# Patient Record
Sex: MALE | Race: WHITE | Employment: OTHER | URBAN - NONMETROPOLITAN AREA
[De-identification: names, ages, dates, MRNs, and addresses within clinical notes are randomized per-mention and may not be internally consistent; named-entity substitution may affect disease eponyms.]

---

## 2017-03-06 ENCOUNTER — OFFICE VISIT (OUTPATIENT)
Dept: FAMILY MEDICINE | Facility: OTHER | Age: 62
End: 2017-03-06
Attending: FAMILY MEDICINE
Payer: COMMERCIAL

## 2017-03-06 VITALS
HEIGHT: 68 IN | BODY MASS INDEX: 30.31 KG/M2 | TEMPERATURE: 97.8 F | DIASTOLIC BLOOD PRESSURE: 70 MMHG | HEART RATE: 78 BPM | WEIGHT: 200 LBS | SYSTOLIC BLOOD PRESSURE: 114 MMHG

## 2017-03-06 DIAGNOSIS — R73.03 PREDIABETES: ICD-10-CM

## 2017-03-06 DIAGNOSIS — K40.20 BILATERAL INGUINAL HERNIA WITHOUT OBSTRUCTION OR GANGRENE, RECURRENCE NOT SPECIFIED: ICD-10-CM

## 2017-03-06 DIAGNOSIS — K21.9 GASTROESOPHAGEAL REFLUX DISEASE WITHOUT ESOPHAGITIS: ICD-10-CM

## 2017-03-06 DIAGNOSIS — Z00.00 ROUTINE GENERAL MEDICAL EXAMINATION AT A HEALTH CARE FACILITY: Primary | ICD-10-CM

## 2017-03-06 DIAGNOSIS — E78.2 MIXED HYPERLIPIDEMIA: ICD-10-CM

## 2017-03-06 DIAGNOSIS — I10 ESSENTIAL HYPERTENSION: ICD-10-CM

## 2017-03-06 DIAGNOSIS — R35.1 NOCTURIA: ICD-10-CM

## 2017-03-06 DIAGNOSIS — F17.201 TOBACCO ABUSE, IN REMISSION: ICD-10-CM

## 2017-03-06 DIAGNOSIS — F43.21 ADJUSTMENT DISORDER WITH DEPRESSED MOOD: ICD-10-CM

## 2017-03-06 DIAGNOSIS — I25.10 CORONARY ARTERY DISEASE INVOLVING NATIVE CORONARY ARTERY OF NATIVE HEART WITHOUT ANGINA PECTORIS: ICD-10-CM

## 2017-03-06 LAB
ALBUMIN SERPL-MCNC: 3.7 G/DL (ref 3.4–5)
ALP SERPL-CCNC: 100 U/L (ref 40–150)
ALT SERPL W P-5'-P-CCNC: 24 U/L (ref 0–70)
ANION GAP SERPL CALCULATED.3IONS-SCNC: 8 MMOL/L (ref 3–14)
AST SERPL W P-5'-P-CCNC: 21 U/L (ref 0–45)
BASOPHILS # BLD AUTO: 0 10E9/L (ref 0–0.2)
BASOPHILS NFR BLD AUTO: 0.4 %
BILIRUB SERPL-MCNC: 0.4 MG/DL (ref 0.2–1.3)
BUN SERPL-MCNC: 13 MG/DL (ref 7–30)
CALCIUM SERPL-MCNC: 8.6 MG/DL (ref 8.5–10.1)
CHLORIDE SERPL-SCNC: 105 MMOL/L (ref 94–109)
CHOLEST SERPL-MCNC: 203 MG/DL
CO2 SERPL-SCNC: 25 MMOL/L (ref 20–32)
CREAT SERPL-MCNC: 0.99 MG/DL (ref 0.66–1.25)
DIFFERENTIAL METHOD BLD: NORMAL
EOSINOPHIL # BLD AUTO: 0.1 10E9/L (ref 0–0.7)
EOSINOPHIL NFR BLD AUTO: 1.3 %
ERYTHROCYTE [DISTWIDTH] IN BLOOD BY AUTOMATED COUNT: 13 % (ref 10–15)
EST. AVERAGE GLUCOSE BLD GHB EST-MCNC: 120 MG/DL
GFR SERPL CREATININE-BSD FRML MDRD: 77 ML/MIN/1.7M2
GLUCOSE SERPL-MCNC: 120 MG/DL (ref 70–99)
HBA1C MFR BLD: 5.8 % (ref 4.3–6)
HCT VFR BLD AUTO: 44.4 % (ref 40–53)
HDLC SERPL-MCNC: 67 MG/DL
HGB BLD-MCNC: 15.7 G/DL (ref 13.3–17.7)
IMM GRANULOCYTES # BLD: 0 10E9/L (ref 0–0.4)
IMM GRANULOCYTES NFR BLD: 0.4 %
LDLC SERPL CALC-MCNC: 117 MG/DL
LYMPHOCYTES # BLD AUTO: 1.3 10E9/L (ref 0.8–5.3)
LYMPHOCYTES NFR BLD AUTO: 24.2 %
MCH RBC QN AUTO: 32.5 PG (ref 26.5–33)
MCHC RBC AUTO-ENTMCNC: 35.4 G/DL (ref 31.5–36.5)
MCV RBC AUTO: 92 FL (ref 78–100)
MONOCYTES # BLD AUTO: 0.6 10E9/L (ref 0–1.3)
MONOCYTES NFR BLD AUTO: 9.9 %
NEUTROPHILS # BLD AUTO: 3.5 10E9/L (ref 1.6–8.3)
NEUTROPHILS NFR BLD AUTO: 63.8 %
NONHDLC SERPL-MCNC: 136 MG/DL
NRBC # BLD AUTO: 0 10*3/UL
NRBC BLD AUTO-RTO: 0 /100
PLATELET # BLD AUTO: 198 10E9/L (ref 150–450)
POTASSIUM SERPL-SCNC: 4.3 MMOL/L (ref 3.4–5.3)
PROT SERPL-MCNC: 7.4 G/DL (ref 6.8–8.8)
PSA SERPL-MCNC: 0.77 UG/L (ref 0–4)
RBC # BLD AUTO: 4.83 10E12/L (ref 4.4–5.9)
SODIUM SERPL-SCNC: 138 MMOL/L (ref 133–144)
TRIGL SERPL-MCNC: 94 MG/DL
WBC # BLD AUTO: 5.5 10E9/L (ref 4–11)

## 2017-03-06 PROCEDURE — 99396 PREV VISIT EST AGE 40-64: CPT | Performed by: FAMILY MEDICINE

## 2017-03-06 PROCEDURE — 80061 LIPID PANEL: CPT | Performed by: FAMILY MEDICINE

## 2017-03-06 PROCEDURE — 84153 ASSAY OF PSA TOTAL: CPT | Performed by: FAMILY MEDICINE

## 2017-03-06 PROCEDURE — 85025 COMPLETE CBC W/AUTO DIFF WBC: CPT | Performed by: FAMILY MEDICINE

## 2017-03-06 PROCEDURE — 71020 ZZHC CHEST TWO VIEWS, FRONT/LAT: CPT | Mod: TC | Performed by: RADIOLOGY

## 2017-03-06 PROCEDURE — 99212 OFFICE O/P EST SF 10 MIN: CPT | Mod: 25 | Performed by: FAMILY MEDICINE

## 2017-03-06 PROCEDURE — 83036 HEMOGLOBIN GLYCOSYLATED A1C: CPT | Performed by: FAMILY MEDICINE

## 2017-03-06 PROCEDURE — 80053 COMPREHEN METABOLIC PANEL: CPT | Performed by: FAMILY MEDICINE

## 2017-03-06 PROCEDURE — 36415 COLL VENOUS BLD VENIPUNCTURE: CPT | Performed by: FAMILY MEDICINE

## 2017-03-06 RX ORDER — METOPROLOL TARTRATE 25 MG/1
TABLET, FILM COATED ORAL
Qty: 180 TABLET | Refills: 3 | Status: SHIPPED | OUTPATIENT
Start: 2017-03-06 | End: 2018-03-12

## 2017-03-06 RX ORDER — NITROGLYCERIN 0.4 MG/1
TABLET SUBLINGUAL
Qty: 25 TABLET | Refills: 5 | Status: SHIPPED | OUTPATIENT
Start: 2017-03-06

## 2017-03-06 RX ORDER — ATORVASTATIN CALCIUM 40 MG/1
TABLET, FILM COATED ORAL
Qty: 90 TABLET | Refills: 3 | Status: SHIPPED | OUTPATIENT
Start: 2017-03-06 | End: 2018-03-12

## 2017-03-06 RX ORDER — OMEPRAZOLE 40 MG/1
CAPSULE, DELAYED RELEASE ORAL
Qty: 90 CAPSULE | Refills: 3 | Status: SHIPPED | OUTPATIENT
Start: 2017-03-06 | End: 2018-03-12

## 2017-03-06 RX ORDER — LISINOPRIL 10 MG/1
TABLET ORAL
Qty: 90 TABLET | Refills: 3 | Status: SHIPPED | OUTPATIENT
Start: 2017-03-06 | End: 2018-02-19

## 2017-03-06 RX ORDER — CITALOPRAM HYDROBROMIDE 40 MG/1
TABLET ORAL
Qty: 30 TABLET | Refills: 1 | Status: SHIPPED | OUTPATIENT
Start: 2017-03-06 | End: 2017-04-10

## 2017-03-06 ASSESSMENT — ANXIETY QUESTIONNAIRES
2. NOT BEING ABLE TO STOP OR CONTROL WORRYING: MORE THAN HALF THE DAYS
1. FEELING NERVOUS, ANXIOUS, OR ON EDGE: SEVERAL DAYS
5. BEING SO RESTLESS THAT IT IS HARD TO SIT STILL: NOT AT ALL
IF YOU CHECKED OFF ANY PROBLEMS ON THIS QUESTIONNAIRE, HOW DIFFICULT HAVE THESE PROBLEMS MADE IT FOR YOU TO DO YOUR WORK, TAKE CARE OF THINGS AT HOME, OR GET ALONG WITH OTHER PEOPLE: SOMEWHAT DIFFICULT
GAD7 TOTAL SCORE: 6
7. FEELING AFRAID AS IF SOMETHING AWFUL MIGHT HAPPEN: NOT AT ALL
3. WORRYING TOO MUCH ABOUT DIFFERENT THINGS: NOT AT ALL
6. BECOMING EASILY ANNOYED OR IRRITABLE: SEVERAL DAYS

## 2017-03-06 ASSESSMENT — PAIN SCALES - GENERAL: PAINLEVEL: NO PAIN (0)

## 2017-03-06 ASSESSMENT — PATIENT HEALTH QUESTIONNAIRE - PHQ9: 5. POOR APPETITE OR OVEREATING: MORE THAN HALF THE DAYS

## 2017-03-06 NOTE — PROGRESS NOTES
Anthony is a 61 year old   Chief Complaint   Patient presents with     Physical       PMHx:  Past Medical History   Diagnosis Date     Acute myocardial infarction, unspecified site, episode of care unspecified 08/03/2011     Adjustment disorder with mixed anxiety and depressed mood 05/23/2012     CAD (coronary artery disease) 3/19/2013     Colon polyps      Coronary atherosclerosis of unspecified type of vessel, native or graft 01/06/2012     Displacement of cervical intervertebral disc without myelopathy      GERD (gastroesophageal reflux disease)      H/O ETOH abuse      HTN (hypertension)      Mixed hyperlipidemia 08/03/2011     Personal history of tobacco use, presenting hazards to health 09/06/2011     Tobacco abuse, in remission     Postsurgical percutaneous transluminal coronary angioplasty status 08/03/2011     Prediabetes      Status post coronary angiogram 3/19/2013     Tobacco abuse, in remission      Past Surgical History   Procedure Laterality Date     Carpal tunnel syndrome  2003     Resolved from Problem List  2012     Stent  07/2011     Myocardial Infarction; Stent placement [facility]'s     Colonoscopy  2008     colonoscopy with polypectomy/ Repeat in 2011     Arthroscopy knee       RT     Release carpal tunnel       RT     Back surgery       C5-6 fusion     Angioplasty  [id number]     Failed angioplasty of OM vessel     Stent, coronary, winsome  2012     Colonoscopy  3/24/2014     Repeat in 2019//Procedure: COLONOSCOPY;  COLONOSCOPY;  Surgeon: Jessica Washington MD;  Location: [facility] OR     These were reviewed with the patient/family.    MEDICATIONS were reviewed and are as follows:   Current Outpatient Prescriptions   Medication Sig Dispense Refill     lisinopril (PRINIVIL/ZESTRIL) 10 MG tablet TAKE 1 TABLET DAILY BY MOUT H - GENERIC FOR ZESTRIL 15 tablet 0     metoprolol (LOPRESSOR) 25 MG tablet TAKE 1 TABLET BY MOUTH TWIC E A DAY 30 tablet 0     omeprazole (PRILOSEC) 40 MG capsule TAKE 1 CAPSULE BY MOUTH ONCE  DAILY. TAKE 30 TO 60 MINUTES BEFORE A MEAL. 15 capsule 0     aspirin 325 MG tablet Take 1 tablet (325 mg) by mouth daily 180 tablet      nitroglycerin (NITROSTAT) 0.4 MG SL tablet PLACE 1 TABLET UNDER THE TONGUE EVERY FIVE MINUTES AS NEEDED FOR CHEST PAIN. DO NOT CRUSH; MAXIMUM OF 3 DOSES IN 15 MINUTES. 25 tablet 5     Omega-3 Fatty Acids (OMEGA-3 FISH OIL PO) Take 1 g by mouth       atorvastatin (LIPITOR) 40 MG tablet TAKE 1 TABLET DAILY BY MOUT H - GENERIC FOR LIPITOR (Patient not taking: Reported on 3/6/2017) 15 tablet 0       ALLERGIES:  Codeine    Family History   Problem Relation Age of Onset     CEREBROVASCULAR DISEASE Father 48     Cause of death     DIABETES Mother      Type 2     HEART DISEASE Mother      Heart valve replacements     Hypertension Mother      Arthritis Sister       Social History   Substance Use Topics     Smoking status: Former Smoker     Packs/day: 1.00     Years: 20.00     Smokeless tobacco: Never Used      Comment: Tried to quit: yes; longest period tobacco-free- 10 years     Alcohol use Yes      Comment: Occasionally       Review Of Systems  Skin: negative for, rash, lumps or bumps, hair changes  Eyes: negative for any visual changes  Ears/Nose/Throat: negative for, nasal congestion, postnasal drainage, hearing loss, vertigo  Respiratory: No shortness of breath, dyspnea on exertion, cough, or hemoptysis  Cardiovascular: negative for, palpitations, tachycardia, chest pain, exertional chest pain or pressure, dyspnea on exertion, lower extremity edema and exercise intolerance  Gastrointestinal: negative for abdominal pain, change in bowel habits, blood in stools.  Genitourinary: negative for dysuria, hematuria, incontinence and sexually transmitted disease. Does have some nocturia  Having sx of right groin pain with  exertion   Musculoskeletal: negative for, joint pain, joint swelling and muscular weakness  Neurologic: negative for, headaches, syncope, local weakness, numbness or tingling  "of hands, numbness or tingling of feet, speech problems and memory problems  Psychiatric: negative for, excessive stress, sleep disturbance, anxiety, depression and excessive alcohol consumption  Hematologic/Lymphatic/Immunologic: negative for, chills, fever and night sweats  Endocrine: negative for, heat intolerance, night sweats, polydipsia and polyuria  PHQ-9 SCORE 2014 2015 3/6/2017   Total Score 2 2 -   Total Score - - 4     HAS MORE DEPRESSIVE S/S SINCE MOTHER  AND SON MOVED AWAY  TROUBLE SLEEPING / INCREASE ETOH- SOME DAILY / \"DONT GIVE A SHIT\"/POOR SLEEP AND DECREASE INTEREST AND ENERGY   IMMUNIZATIONS reviewed.      Constitutional: healthy, alert and no distress  Blood pressure 114/70, pulse 78, temperature 97.8  F (36.6  C), height 5' 8\" (1.727 m), weight 200 lb (90.7 kg).  Head: Normocephalic. No masses, lesions, tenderness or abnormalities  Neck: Neck supple. No adenopathy. Thyroid symmetric, normal size,, Carotids without bruits.  ENT: ENT exam normal, no neck nodes or sinus tenderness  Cardiovascular: negative, PMI normal. No lifts, heaves, or thrills. RRR. No murmurs, clicks gallops or rub  Respiratory: negative, Percussion normal. Good diaphragmatic excursion. Lungs clear  breasts symmetric  Gastrointestinal: Abdomen soft, non-tender. BS normal. No masses, organomegaly  : Normal external genitalia without lesions.  Prostate exam normal for age Bilateral small hernia- - sliding along with small hydrocele if feels like on right side.    Musculoskeletal: extremities normal- no gross deformities noted, gait normal and normal muscle tone  Skin: no suspicious lesions or rashes  Neurologic: negative  Psychiatric: mentation appears normal and affect normal/bright  Hematologic/Lymphatic/Immunologic: normal ant/post cervical, axillary, supraclavicular and inguinal nodes      CXR unremarkable     Results for orders placed or performed in visit on 17 (from the past 24 hour(s))   CBC with " platelets differential   Result Value Ref Range    WBC 5.5 4.0 - 11.0 10e9/L    RBC Count 4.83 4.4 - 5.9 10e12/L    Hemoglobin 15.7 13.3 - 17.7 g/dL    Hematocrit 44.4 40.0 - 53.0 %    MCV 92 78 - 100 fl    MCH 32.5 26.5 - 33.0 pg    MCHC 35.4 31.5 - 36.5 g/dL    RDW 13.0 10.0 - 15.0 %    Platelet Count 198 150 - 450 10e9/L    Diff Method Automated Method     % Neutrophils 63.8 %    % Lymphocytes 24.2 %    % Monocytes 9.9 %    % Eosinophils 1.3 %    % Basophils 0.4 %    % Immature Granulocytes 0.4 %    Nucleated RBCs 0 0 /100    Absolute Neutrophil 3.5 1.6 - 8.3 10e9/L    Absolute Lymphocytes 1.3 0.8 - 5.3 10e9/L    Absolute Monocytes 0.6 0.0 - 1.3 10e9/L    Absolute Eosinophils 0.1 0.0 - 0.7 10e9/L    Absolute Basophils 0.0 0.0 - 0.2 10e9/L    Abs Immature Granulocytes 0.0 0 - 0.4 10e9/L    Absolute Nucleated RBC 0.0    Comprehensive metabolic panel   Result Value Ref Range    Sodium 138 133 - 144 mmol/L    Potassium 4.3 3.4 - 5.3 mmol/L    Chloride 105 94 - 109 mmol/L    Carbon Dioxide 25 20 - 32 mmol/L    Anion Gap 8 3 - 14 mmol/L    Glucose 120 (H) 70 - 99 mg/dL    Urea Nitrogen 13 7 - 30 mg/dL    Creatinine 0.99 0.66 - 1.25 mg/dL    GFR Estimate 77 >60 mL/min/1.7m2    GFR Estimate If Black >90   GFR Calc   >60 mL/min/1.7m2    Calcium 8.6 8.5 - 10.1 mg/dL    Bilirubin Total 0.4 0.2 - 1.3 mg/dL    Albumin 3.7 3.4 - 5.0 g/dL    Protein Total 7.4 6.8 - 8.8 g/dL    Alkaline Phosphatase 100 40 - 150 U/L    ALT 24 0 - 70 U/L    AST 21 0 - 45 U/L   Lipid Profile   Result Value Ref Range    Cholesterol 203 (H) <200 mg/dL    Triglycerides 94 <150 mg/dL    HDL Cholesterol 67 >39 mg/dL    LDL Cholesterol Calculated 117 (H) <100 mg/dL    Non HDL Cholesterol 136 (H) <130 mg/dL   PSA tumor marker   Result Value Ref Range    PSA 0.77 0 - 4 ug/L       ASSESSMENT:  (Z00.00) Routine general medical examination at a health care facility  (primary encounter diagnosis)  Comment: see below  Plan: see in a year      (K21.9) Gastroesophageal reflux disease without esophagitis  Comment: needs PPI- has tried off in past.   Plan: CBC with platelets differential, omeprazole         (PRILOSEC) 40 MG capsule        RF done     (R73.03) Prediabetes  Comment: discussed again .  Plan: Comprehensive metabolic panel, Lipid Profile,         Hemoglobin A1c        Will check A1C - pending    (E78.2) Mixed hyperlipidemia  Comment: off lipitor- needs to restart  Plan: Comprehensive metabolic panel, Lipid Profile,         lisinopril (PRINIVIL/ZESTRIL) 10 MG tablet,         atorvastatin (LIPITOR) 40 MG tablet        Continue current medications and behavioral changes.     (I10) Essential hypertension  Comment: stable - Continue current medications and behavioral changes.   Plan: CBC with platelets differential, Comprehensive         metabolic panel, Lipid Profile            (I25.10) Coronary artery disease involving native coronary artery of native heart without angina pectoris  Comment: stable - Continue current medications and behavioral changes.   Plan: nitroglycerin (NITROSTAT) 0.4 MG sublingual         tablet            (F17.201) Tobacco abuse, in remission  Comment: CXR ok   Plan: Comprehensive metabolic panel, Lipid Profile,         XR Chest 2 Views            (R35.1) Nocturia  Comment: mild   Plan: PSA tumor marker            (F43.21) Adjustment disorder with depressed mood  Comment: discussed- handouts given . Risk and benefits of SSRI was discussed and verbal consent to proceed was given.   Plan: citalopram (CELEXA) 40 MG tablet        F/u in 4 weeks.    (K40.20) Bilateral inguinal hernia without obstruction or gangrene, recurrence not specified  Comment: symptomatic on right side. Will refer to Gen. Surgery.   Plan: GENERAL SURG ADULT REFERRAL

## 2017-03-06 NOTE — NURSING NOTE
"Chief Complaint   Patient presents with     Physical       Initial /70 (BP Location: Right arm, Patient Position: Chair, Cuff Size: Adult Large)  Pulse 78  Temp 97.8  F (36.6  C)  Ht 5' 8\" (1.727 m)  Wt 200 lb (90.7 kg)  BMI 30.41 kg/m2 Estimated body mass index is 30.41 kg/(m^2) as calculated from the following:    Height as of this encounter: 5' 8\" (1.727 m).    Weight as of this encounter: 200 lb (90.7 kg).  Medication Reconciliation: complete     Calvin Torre      "

## 2017-03-06 NOTE — MR AVS SNAPSHOT
After Visit Summary   3/6/2017    Anthony Steele    MRN: [id number]           Patient Information     Date Of Birth          1955        Visit Information        Provider Department      3/6/2017 8:00 AM Abdulkadir Huizar MD [facility]        Today's Diagnoses     Routine general medical examination at a health care facility    -  1    Gastroesophageal reflux disease without esophagitis        Prediabetes        Mixed hyperlipidemia        Essential hypertension        Coronary artery disease involving native coronary artery of native heart without angina pectoris        Tobacco abuse, in remission        Nocturia        Essential hypertension with goal blood pressure less than 140/90        Adjustment disorder with depressed mood        Bilateral inguinal hernia without obstruction or gangrene, recurrence not specified          Care Instructions    Results for orders placed or performed in visit on 03/06/17 (from the past 24 hour(s))   CBC with platelets differential   Result Value Ref Range    WBC 5.5 4.0 - 11.0 10e9/L    RBC Count 4.83 4.4 - 5.9 10e12/L    Hemoglobin 15.7 13.3 - 17.7 g/dL    Hematocrit 44.4 40.0 - 53.0 %    MCV 92 78 - 100 fl    MCH 32.5 26.5 - 33.0 pg    MCHC 35.4 31.5 - 36.5 g/dL    RDW 13.0 10.0 - 15.0 %    Platelet Count 198 150 - 450 10e9/L    Diff Method Automated Method     % Neutrophils 63.8 %    % Lymphocytes 24.2 %    % Monocytes 9.9 %    % Eosinophils 1.3 %    % Basophils 0.4 %    % Immature Granulocytes 0.4 %    Nucleated RBCs 0 0 /100    Absolute Neutrophil 3.5 1.6 - 8.3 10e9/L    Absolute Lymphocytes 1.3 0.8 - 5.3 10e9/L    Absolute Monocytes 0.6 0.0 - 1.3 10e9/L    Absolute Eosinophils 0.1 0.0 - 0.7 10e9/L    Absolute Basophils 0.0 0.0 - 0.2 10e9/L    Abs Immature Granulocytes 0.0 0 - 0.4 10e9/L    Absolute Nucleated RBC 0.0    Comprehensive metabolic panel   Result Value Ref Range    Sodium 138 133 - 144 mmol/L    Potassium 4.3 3.4 - 5.3 mmol/L     Chloride 105 94 - 109 mmol/L    Carbon Dioxide 25 20 - 32 mmol/L    Anion Gap 8 3 - 14 mmol/L    Glucose 120 (H) 70 - 99 mg/dL    Urea Nitrogen 13 7 - 30 mg/dL    Creatinine 0.99 0.66 - 1.25 mg/dL    GFR Estimate 77 >60 mL/min/1.7m2    GFR Estimate If Black >90   GFR Calc   >60 mL/min/1.7m2    Calcium 8.6 8.5 - 10.1 mg/dL    Bilirubin Total 0.4 0.2 - 1.3 mg/dL    Albumin 3.7 3.4 - 5.0 g/dL    Protein Total 7.4 6.8 - 8.8 g/dL    Alkaline Phosphatase 100 40 - 150 U/L    ALT 24 0 - 70 U/L    AST 21 0 - 45 U/L   Lipid Profile   Result Value Ref Range    Cholesterol 203 (H) <200 mg/dL    Triglycerides 94 <150 mg/dL    HDL Cholesterol 67 >39 mg/dL    LDL Cholesterol Calculated 117 (H) <100 mg/dL    Non HDL Cholesterol 136 (H) <130 mg/dL   PSA tumor marker   Result Value Ref Range    PSA 0.77 0 - 4 ug/L       Understanding Adjustment Disorders  Most people have stress in their lives, and sometimes you may have more than you can handle. You may find it hard to cope with a stressful event. As a result, you may become anxious and depressed. You might even get sick. These can be symptoms of an adjustment disorder. But you don t have to suffer. Ask your doctor or mental health professional for help.    Common symptoms of an adjustment disorder    Hopelessness    Frequent crying    Depressed mood    Trembling or twitching    Palpitations    Health problems    Withdrawal    Anxiety or tension   What is an adjustment disorder?  Adjustment disorders sometimes occur when life gets to be too much. They often appear within 3 months of a stressful time. The symptoms vary widely. You might pretend the stressful event never happened. Or, you might think about it so much you can t eat or sleep. In most cases, your feelings may seem beyond your control.  What causes it?  The events that trigger an adjustment disorder vary from person to person. Adults may be troubled by work, money, or marriage problems. Teens are more  likely bothered by school or conflict with parents. They also may find it hard to cope with a divorce or sex. The death of a loved one can be especially hard to face. So can major life changes such as a move. Poverty or a lack of social skills may make matters worse.  What can be done?  Adjustment disorders can almost always be helped by therapy. You may feel relieved just to talk to someone. In some cases, only you and your therapist will meet. In others, your whole family may be involved. You might also join a group for people with this disorder. The support and concern of others can help you recover more quickly.    7762-0332 [employer]. [address] [place] [address], [place] [address]. All rights reserved. This information is not intended as a substitute for professional medical care. Always follow your healthcare professional's instructions.        Adjustment Disorder  Life changes -- work, family, parents, children -- each can cause a great deal of stress in life. An adjustment disorder means you have trouble dealing with this change and stress. This problem can have serious results. You may feel helpless, depressed, make bad decisions, or even feel like you want to hurt yourself.  Adjustment disorder can cause anxiety or depression. It is triggered by daily stresses such as:    Death of a loved one    Divorce    Marriage    General life changes such as changing or leaving a job    Moving    Illness or other health issue for you or a family  member    Sex    Money     Symptoms may include:    Sadness, crying    Anxiety    Insomnia    Poor concentration    Trouble doing simple things    New problems at work or with family or friends    Loss of self-esteem    Sense of hopelessness    Feeling trapped or cut off from others  With this condition, it is common to feel sad, guilty, hopeless and restless. These feelings may continue for weeks or months. It can be helpful to identify what is causing the  additional stress and take steps to get extra support. If new stressful events do not occur, it is likely that you will gradually start feeling better.  Home care    If you have been given a prescription for medicine, take it as directed.    It helps to talk about your feelings and thoughts with family or friends that understand and support you.  Follow-up care  Follow up with your healthcare provider, or therapist as advised. Let them know if this condition does not improve or gets worse.  When to seek medical advice  Call your healthcare provider right away if any of these occur:    Worsening depression or anxiety    Feeling out of control    Thoughts of harming yourself or another    Being unable to care for yourself    1530-6717 [employer]. [address] [place] [address], [place] [address]. All rights reserved. This information is not intended as a substitute for professional medical care. Always follow your healthcare professional's instructions.        Grief Reaction  Grief is the feeling that we all have when we lose someone or something that has been important in our life. Grief is an unavoidable and normal reaction to this loss, and can last from months to years. The amount of time depends on different factors. These include how close the person was to you, and how much support you have through the grief process. Symptoms include both physical and emotional symptoms.  Physical reactions:     Loss of appetite or overeating    Changes in weight    Trouble getting to sleep or staying asleep    Hair loss    Upset stomach, indigestion, heart burn, abdominal pain, cramping, diarrhea    Sense of trouble breathing    Trembling, shakiness  Emotional reactions:    Sadness    Anxiety    Feeling depressed or helpless    Difficulty concentrating    Detachment or withdrawal from those around you    Loss of interest in your normal life and work  Home care    Allow yourself to feel the pain of your loss. For some,  this can be a key part of healing grief. Talk about your pain with others who understand. Share good memories that involve the person, pet, or possession  you lost.    Take time for yourself. Make it a point to do things that you enjoy (gardening, walking in nature, going to a movie, etc.).    Take care of your physical body. Eat a balanced diet (low in saturated fat and high in fruits and vegetables) and establish an exercise plan at least 3 times a week for 30 minutes. Even mild-moderate exercise (like brisk walking) can make you feel better. Get plenty of sleep.    Avoid the use of alcohol and drugs to cover your emotional pain. This only slows down the emotional healing process.    Do not isolate yourself from others. Have daily contact with family or friends. Talk about your loss to those closest to you.    For additional support, meet with your //rabbi, a counselor or therapist, or your own doctor.    Consider joining a grief support group. Ask your doctor or our staff for information on how to find one in your area.    If you have been prescribed a medicine to help with your symptoms, take it only as directed. Do not use it with alcohol.  Follow-up care  Follow up with your healthcare provider, or as advised.  Call 911  Call 911 if any of these occur:    Trouble breathing    Very confused    Very drowsy or trouble awakening    Fainting or loss of consciousness    Rapid heart rate    Seizure    New chest pain that becomes more severe, lasts longer, or spreads into your shoulder, arm, neck, jaw or back  When to seek medical advice  Call your healthcare provider right away if any of these occur:    Worsening symptoms    Unable to eat or sleep for three days in a row    Feeling extreme depression, fear, anxiety, or anger toward yourself or others    Feeling out of control    Feeling that you may try to harm yourself    family or friends express concern over your behavior and ask you to get help     5874-2647 [employer]. [address] [place] [address], [place] [address]. All rights reserved. This information is not intended as a substitute for professional medical care. Always follow your healthcare professional's instructions.              Follow-ups after your visit        Additional Services     GENERAL SURG ADULT REFERRAL       Your provider has referred you to: [facility]     Please be aware that coverage of these services is subject to the terms and limitations of your health insurance plan.  Call member services at your health plan with any benefit or coverage questions.      Please bring the following to your appointment:  >>   Any x-rays, CTs or MRIs which have been performed.  Contact the facility where they were done to arrange for  prior to your scheduled appointment.  Any new CT, MRI or other procedures ordered by your specialist must be performed at a [facility] facility or coordinated by your clinic's referral office.    >>   List of current medications   >>   This referral request   >>   Any documents/labs given to you for this referral                  Who to contact     If you have questions or need follow up information about today's clinic visit or your schedule please contact [facility] [place] directly at [phone].  Normal or non-critical lab and imaging results will be communicated to you by MyChart, letter or phone within 4 business days after the clinic has received the results. If you do not hear from us within 7 days, please contact the clinic through [employer] or phone. If you have a critical or abnormal lab result, we will notify you by phone as soon as possible.  Submit refill requests through [employer] or call your pharmacy and they will forward the refill request to us. Please allow 3 business days for your refill to be completed.          Additional Information About Your Visit        Textingly Information     [employer] lets you send messages to your doctor, view your  "test results, renew your prescriptions, schedule appointments and more. To sign up, go to [email or web address] . Click on \"Log in\" on the left side of the screen, which will take you to the Welcome page. Then click on \"Sign up Now\" on the right side of the page.     You will be asked to enter the access code listed below, as well as some personal information. Please follow the directions to create your username and password.     Your access code is: [email or web address]  Expires: 2017  9:02 AM     Your access code will  in 90 days. If you need help or a new code, please call your [facility] clinic or [phone].        Care EveryWhere ID     This is your Care EveryWhere ID. This could be used by other organizations to access your [facility] medical records  [id number]        Your Vitals Were     Pulse Temperature Height BMI (Body Mass Index)          78 97.8  F (36.6  C) 5' 8\" (1.727 m) 30.41 kg/m2         Blood Pressure from Last 3 Encounters:   17 114/70   07/24/15 120/70   05/14/15 149/100    Weight from Last 3 Encounters:   17 200 lb (90.7 kg)   07/24/15 208 lb (94.3 kg)   14 200 lb (90.7 kg)              We Performed the Following     CBC with platelets differential     Comprehensive metabolic panel     GENERAL SURG ADULT REFERRAL     Hemoglobin A1c     Lipid Profile     PSA tumor marker     XR Chest 2 Views          Today's Medication Changes          These changes are accurate as of: 3/6/17  9:02 AM.  If you have any questions, ask your nurse or doctor.               Start taking these medicines.        Dose/Directions    citalopram 40 MG tablet   Commonly known as:  celeXA   Used for:  Adjustment disorder with depressed mood   Started by:  Abdulkadir Huizar MD        Take 1/2 tablet (20 mg) for 4-6 Days  , then increase to 1 tablet orally daily-- take at bedtime.   Quantity:  30 tablet   Refills:  1         These medicines have changed or have updated prescriptions.        " Dose/Directions    atorvastatin 40 MG tablet   Commonly known as:  LIPITOR   This may have changed:  See the new instructions.   Used for:  Mixed hyperlipidemia   Changed by:  Abdulkadir Huizar MD        TAKE 1 TABLET DAILY BY MOUT H - GENERIC FOR LIPITOR   Quantity:  90 tablet   Refills:  3       lisinopril 10 MG tablet   Commonly known as:  PRINIVIL/ZESTRIL   This may have changed:  See the new instructions.   Used for:  Mixed hyperlipidemia   Changed by:  Abdulkadir Huizar MD        TAKE 1 TABLET DAILY BY BRODY H - GENERIC FOR ZESTRIL   Quantity:  90 tablet   Refills:  3       metoprolol 25 MG tablet   Commonly known as:  LOPRESSOR   This may have changed:  See the new instructions.   Used for:  Essential hypertension with goal blood pressure less than 140/90   Changed by:  Abdulkadir Huizar MD        TAKE 1 TABLET BY MOUTH TWIC E A DAY   Quantity:  180 tablet   Refills:  3       omeprazole 40 MG capsule   Commonly known as:  priLOSEC   This may have changed:  See the new instructions.   Used for:  Gastroesophageal reflux disease without esophagitis   Changed by:  Abdulkadir Huizar MD        TAKE 1 CAPSULE BY MOUTH ONCE DAILY. TAKE 30 TO 60 MINUTES BEFORE A MEAL.   Quantity:  90 capsule   Refills:  3            Where to get your medicines      These medications were sent to [id number] - [place], [place] - [address]  [address] [facility] [place] [address]     Phone:  [phone]     atorvastatin 40 MG tablet    citalopram 40 MG tablet    lisinopril 10 MG tablet    metoprolol 25 MG tablet    nitroglycerin 0.4 MG sublingual tablet    omeprazole 40 MG capsule                Primary Care Provider Office Phone # Fax #    Abdulkadir Huizar [phone]       [facility] [address]  [place] [address]        Thank you!     Thank you for choosing [facility]  for your care. Our goal is always to provide you with excellent care. Hearing back from our patients is one way  we can continue to improve our services. Please take a few minutes to complete the written survey that you may receive in the mail after your visit with us. Thank you!             Your Updated Medication List - Protect others around you: Learn how to safely use, store and throw away your medicines at www.disposemymeds.org.          This list is accurate as of: 3/6/17  9:02 AM.  Always use your most recent med list.                   Brand Name Dispense Instructions for use    aspirin 325 MG tablet     180 tablet    Take 1 tablet (325 mg) by mouth daily       atorvastatin 40 MG tablet    LIPITOR    90 tablet    TAKE 1 TABLET DAILY BY MOUT H - GENERIC FOR LIPITOR       citalopram 40 MG tablet    celeXA    30 tablet    Take 1/2 tablet (20 mg) for 4-6 Days  , then increase to 1 tablet orally daily-- take at bedtime.       lisinopril 10 MG tablet    PRINIVIL/ZESTRIL    90 tablet    TAKE 1 TABLET DAILY BY MOUT H - GENERIC FOR ZESTRIL       metoprolol 25 MG tablet    LOPRESSOR    180 tablet    TAKE 1 TABLET BY MOUTH TWIC E A DAY       nitroglycerin 0.4 MG sublingual tablet    NITROSTAT    25 tablet    PLACE 1 TABLET UNDER THE TONGUE EVERY FIVE MINUTES AS NEEDED FOR CHEST PAIN. DO NOT CRUSH; MAXIMUM OF 3 DOSES IN 15 MINUTES.       OMEGA-3 FISH OIL PO      Take 1 g by mouth       omeprazole 40 MG capsule    priLOSEC    90 capsule    TAKE 1 CAPSULE BY MOUTH ONCE DAILY. TAKE 30 TO 60 MINUTES BEFORE A MEAL.

## 2017-03-06 NOTE — PATIENT INSTRUCTIONS
Results for orders placed or performed in visit on 03/06/17 (from the past 24 hour(s))   CBC with platelets differential   Result Value Ref Range    WBC 5.5 4.0 - 11.0 10e9/L    RBC Count 4.83 4.4 - 5.9 10e12/L    Hemoglobin 15.7 13.3 - 17.7 g/dL    Hematocrit 44.4 40.0 - 53.0 %    MCV 92 78 - 100 fl    MCH 32.5 26.5 - 33.0 pg    MCHC 35.4 31.5 - 36.5 g/dL    RDW 13.0 10.0 - 15.0 %    Platelet Count 198 150 - 450 10e9/L    Diff Method Automated Method     % Neutrophils 63.8 %    % Lymphocytes 24.2 %    % Monocytes 9.9 %    % Eosinophils 1.3 %    % Basophils 0.4 %    % Immature Granulocytes 0.4 %    Nucleated RBCs 0 0 /100    Absolute Neutrophil 3.5 1.6 - 8.3 10e9/L    Absolute Lymphocytes 1.3 0.8 - 5.3 10e9/L    Absolute Monocytes 0.6 0.0 - 1.3 10e9/L    Absolute Eosinophils 0.1 0.0 - 0.7 10e9/L    Absolute Basophils 0.0 0.0 - 0.2 10e9/L    Abs Immature Granulocytes 0.0 0 - 0.4 10e9/L    Absolute Nucleated RBC 0.0    Comprehensive metabolic panel   Result Value Ref Range    Sodium 138 133 - 144 mmol/L    Potassium 4.3 3.4 - 5.3 mmol/L    Chloride 105 94 - 109 mmol/L    Carbon Dioxide 25 20 - 32 mmol/L    Anion Gap 8 3 - 14 mmol/L    Glucose 120 (H) 70 - 99 mg/dL    Urea Nitrogen 13 7 - 30 mg/dL    Creatinine 0.99 0.66 - 1.25 mg/dL    GFR Estimate 77 >60 mL/min/1.7m2    GFR Estimate If Black >90   GFR Calc   >60 mL/min/1.7m2    Calcium 8.6 8.5 - 10.1 mg/dL    Bilirubin Total 0.4 0.2 - 1.3 mg/dL    Albumin 3.7 3.4 - 5.0 g/dL    Protein Total 7.4 6.8 - 8.8 g/dL    Alkaline Phosphatase 100 40 - 150 U/L    ALT 24 0 - 70 U/L    AST 21 0 - 45 U/L   Lipid Profile   Result Value Ref Range    Cholesterol 203 (H) <200 mg/dL    Triglycerides 94 <150 mg/dL    HDL Cholesterol 67 >39 mg/dL    LDL Cholesterol Calculated 117 (H) <100 mg/dL    Non HDL Cholesterol 136 (H) <130 mg/dL   PSA tumor marker   Result Value Ref Range    PSA 0.77 0 - 4 ug/L       Understanding Adjustment Disorders  Most people have stress in  their lives, and sometimes you may have more than you can handle. You may find it hard to cope with a stressful event. As a result, you may become anxious and depressed. You might even get sick. These can be symptoms of an adjustment disorder. But you don t have to suffer. Ask your doctor or mental health professional for help.    Common symptoms of an adjustment disorder    Hopelessness    Frequent crying    Depressed mood    Trembling or twitching    Palpitations    Health problems    Withdrawal    Anxiety or tension   What is an adjustment disorder?  Adjustment disorders sometimes occur when life gets to be too much. They often appear within 3 months of a stressful time. The symptoms vary widely. You might pretend the stressful event never happened. Or, you might think about it so much you can t eat or sleep. In most cases, your feelings may seem beyond your control.  What causes it?  The events that trigger an adjustment disorder vary from person to person. Adults may be troubled by work, money, or marriage problems. Teens are more likely bothered by school or conflict with parents. They also may find it hard to cope with a divorce or sex. The death of a loved one can be especially hard to face. So can major life changes such as a move. Poverty or a lack of social skills may make matters worse.  What can be done?  Adjustment disorders can almost always be helped by therapy. You may feel relieved just to talk to someone. In some cases, only you and your therapist will meet. In others, your whole family may be involved. You might also join a group for people with this disorder. The support and concern of others can help you recover more quickly.    3174-3128 [employer]. [address] [place] [address], [place] [address]. All rights reserved. This information is not intended as a substitute for professional medical care. Always follow your healthcare professional's instructions.        Adjustment  Disorder  Life changes -- work, family, parents, children -- each can cause a great deal of stress in life. An adjustment disorder means you have trouble dealing with this change and stress. This problem can have serious results. You may feel helpless, depressed, make bad decisions, or even feel like you want to hurt yourself.  Adjustment disorder can cause anxiety or depression. It is triggered by daily stresses such as:    Death of a loved one    Divorce    Marriage    General life changes such as changing or leaving a job    Moving    Illness or other health issue for you or a family  member    Sex    Money     Symptoms may include:    Sadness, crying    Anxiety    Insomnia    Poor concentration    Trouble doing simple things    New problems at work or with family or friends    Loss of self-esteem    Sense of hopelessness    Feeling trapped or cut off from others  With this condition, it is common to feel sad, guilty, hopeless and restless. These feelings may continue for weeks or months. It can be helpful to identify what is causing the additional stress and take steps to get extra support. If new stressful events do not occur, it is likely that you will gradually start feeling better.  Home care    If you have been given a prescription for medicine, take it as directed.    It helps to talk about your feelings and thoughts with family or friends that understand and support you.  Follow-up care  Follow up with your healthcare provider, or therapist as advised. Let them know if this condition does not improve or gets worse.  When to seek medical advice  Call your healthcare provider right away if any of these occur:    Worsening depression or anxiety    Feeling out of control    Thoughts of harming yourself or another    Being unable to care for yourself    3208-3254 [employer]. [address] [place] [address], [place] [address]. All rights reserved. This information is not intended as a substitute for  professional medical care. Always follow your healthcare professional's instructions.        Grief Reaction  Grief is the feeling that we all have when we lose someone or something that has been important in our life. Grief is an unavoidable and normal reaction to this loss, and can last from months to years. The amount of time depends on different factors. These include how close the person was to you, and how much support you have through the grief process. Symptoms include both physical and emotional symptoms.  Physical reactions:     Loss of appetite or overeating    Changes in weight    Trouble getting to sleep or staying asleep    Hair loss    Upset stomach, indigestion, heart burn, abdominal pain, cramping, diarrhea    Sense of trouble breathing    Trembling, shakiness  Emotional reactions:    Sadness    Anxiety    Feeling depressed or helpless    Difficulty concentrating    Detachment or withdrawal from those around you    Loss of interest in your normal life and work  Home care    Allow yourself to feel the pain of your loss. For some, this can be a key part of healing grief. Talk about your pain with others who understand. Share good memories that involve the person, pet, or possession  you lost.    Take time for yourself. Make it a point to do things that you enjoy (gardening, walking in nature, going to a movie, etc.).    Take care of your physical body. Eat a balanced diet (low in saturated fat and high in fruits and vegetables) and establish an exercise plan at least 3 times a week for 30 minutes. Even mild-moderate exercise (like brisk walking) can make you feel better. Get plenty of sleep.    Avoid the use of alcohol and drugs to cover your emotional pain. This only slows down the emotional healing process.    Do not isolate yourself from others. Have daily contact with family or friends. Talk about your loss to those closest to you.    For additional support, meet with your //, a  counselor or therapist, or your own doctor.    Consider joining a grief support group. Ask your doctor or our staff for information on how to find one in your area.    If you have been prescribed a medicine to help with your symptoms, take it only as directed. Do not use it with alcohol.  Follow-up care  Follow up with your healthcare provider, or as advised.  Call 911  Call 911 if any of these occur:    Trouble breathing    Very confused    Very drowsy or trouble awakening    Fainting or loss of consciousness    Rapid heart rate    Seizure    New chest pain that becomes more severe, lasts longer, or spreads into your shoulder, arm, neck, jaw or back  When to seek medical advice  Call your healthcare provider right away if any of these occur:    Worsening symptoms    Unable to eat or sleep for three days in a row    Feeling extreme depression, fear, anxiety, or anger toward yourself or others    Feeling out of control    Feeling that you may try to harm yourself    family or friends express concern over your behavior and ask you to get help    9471-3439 [employer]. [address] [place] [address], [place] [address]. All rights reserved. This information is not intended as a substitute for professional medical care. Always follow your healthcare professional's instructions.

## 2017-03-07 ASSESSMENT — ANXIETY QUESTIONNAIRES: GAD7 TOTAL SCORE: 6

## 2017-03-07 ASSESSMENT — PATIENT HEALTH QUESTIONNAIRE - PHQ9: SUM OF ALL RESPONSES TO PHQ QUESTIONS 1-9: 4

## 2017-04-10 ENCOUNTER — OFFICE VISIT (OUTPATIENT)
Dept: FAMILY MEDICINE | Facility: OTHER | Age: 62
End: 2017-04-10
Attending: FAMILY MEDICINE
Payer: COMMERCIAL

## 2017-04-10 VITALS
SYSTOLIC BLOOD PRESSURE: 112 MMHG | HEIGHT: 68 IN | TEMPERATURE: 97.5 F | BODY MASS INDEX: 30.31 KG/M2 | HEART RATE: 60 BPM | WEIGHT: 200 LBS | DIASTOLIC BLOOD PRESSURE: 60 MMHG

## 2017-04-10 DIAGNOSIS — F43.21 ADJUSTMENT DISORDER WITH DEPRESSED MOOD: Primary | ICD-10-CM

## 2017-04-10 PROCEDURE — 99213 OFFICE O/P EST LOW 20 MIN: CPT | Performed by: FAMILY MEDICINE

## 2017-04-10 RX ORDER — BUPROPION HYDROCHLORIDE 150 MG/1
TABLET ORAL
Qty: 60 TABLET | Refills: 1 | Status: SHIPPED | OUTPATIENT
Start: 2017-04-10 | End: 2017-04-11

## 2017-04-10 ASSESSMENT — ANXIETY QUESTIONNAIRES
6. BECOMING EASILY ANNOYED OR IRRITABLE: SEVERAL DAYS
2. NOT BEING ABLE TO STOP OR CONTROL WORRYING: SEVERAL DAYS
5. BEING SO RESTLESS THAT IT IS HARD TO SIT STILL: SEVERAL DAYS
3. WORRYING TOO MUCH ABOUT DIFFERENT THINGS: SEVERAL DAYS
7. FEELING AFRAID AS IF SOMETHING AWFUL MIGHT HAPPEN: NOT AT ALL
IF YOU CHECKED OFF ANY PROBLEMS ON THIS QUESTIONNAIRE, HOW DIFFICULT HAVE THESE PROBLEMS MADE IT FOR YOU TO DO YOUR WORK, TAKE CARE OF THINGS AT HOME, OR GET ALONG WITH OTHER PEOPLE: NOT DIFFICULT AT ALL
1. FEELING NERVOUS, ANXIOUS, OR ON EDGE: SEVERAL DAYS
GAD7 TOTAL SCORE: 8

## 2017-04-10 ASSESSMENT — PAIN SCALES - GENERAL: PAINLEVEL: NO PAIN (0)

## 2017-04-10 ASSESSMENT — PATIENT HEALTH QUESTIONNAIRE - PHQ9: 5. POOR APPETITE OR OVEREATING: NEARLY EVERY DAY

## 2017-04-10 NOTE — PROGRESS NOTES
"  SUBJECTIVE:                                                    Anthony Steele is a 61 year old male who presents to clinic today for the following health issues:      Abnormal Mood Symptoms      Duration: months    Description:  Depression: YES  Anxiety: YES  Panic attacks: no      Accompanying signs and symptoms: see PHQ-9 and MACEY scores    History (similar episodes/previous evaluation): None    Precipitating or alleviating factors: None    Therapies tried and outcome: Celexa (Citalopram)    Taking Celexa -  Feels better- more himself  BUT has decrease ejaculation     PHQ-9 SCORE 7/24/2015 3/6/2017 4/10/2017   Total Score 2 - -   Total Score - 4 5     MACEY-7 SCORE 3/6/2017 4/10/2017   Total Score 6 8             Problem list and histories reviewed & adjusted, as indicated.  Additional history: as documented        Reviewed and updated as needed this visit by clinical staff  Tobacco  Allergies  Meds  Med Hx  Surg Hx  Fam Hx  Soc Hx      Reviewed and updated as needed this visit by Provider         ROS:  C: NEGATIVE for fever, chills, change in weight    OBJECTIVE:                                                    /60  Pulse 60  Temp 97.5  F (36.4  C)  Ht 5' 8\" (1.727 m)  Wt 200 lb (90.7 kg)  BMI 30.41 kg/m2  Body mass index is 30.41 kg/(m^2).   GENERAL: healthy, alert, well nourished, well hydrated, no distress  PSYCH: Alert and oriented times 3; speech- coherent , normal rate and volume; able to articulate logical thoughts, able to abstract reason, no tangential thoughts, no hallucinations or delusions, affect- normal         ASSESSMENT/PLAN:                                                      (F43.21) Adjustment disorder with depressed mood  (primary encounter diagnosis)  Comment: *Will wean off Celexa and start Wellbutrin  Since side effects too bothersome.    Plan: buPROPion (WELLBUTRIN XL) 150 MG 24 hr tablet        F/u in 3-4 weeks. Symptomatic treatment was discussed along when patient " should call and/or come back into the clinic or go to ER/Urgent care. All questions answered.         See Patient Instructions    Abdulkadir Huizar MD  [facility]

## 2017-04-10 NOTE — NURSING NOTE
"Chief Complaint   Patient presents with     MOOD CHANGES       Initial /60  Pulse 60  Temp 97.5  F (36.4  C)  Ht 5' 8\" (1.727 m)  Wt 200 lb (90.7 kg)  BMI 30.41 kg/m2 Estimated body mass index is 30.41 kg/(m^2) as calculated from the following:    Height as of this encounter: 5' 8\" (1.727 m).    Weight as of this encounter: 200 lb (90.7 kg).  Medication Reconciliation: complete     Calvin Torre      "

## 2017-04-10 NOTE — MR AVS SNAPSHOT
"              After Visit Summary   4/10/2017    Anthony Steele    MRN: [id number]           Patient Information     Date Of Birth          1955        Visit Information        Provider Department      4/10/2017 4:00 PM Abdulkadir Huziar MD [facility]        Today's Diagnoses     Adjustment disorder with depressed mood    -  1      Care Instructions    Any problems call.         Follow-ups after your visit        Your next 10 appointments already scheduled     May 02, 2017  9:00 AM CDT   (Arrive by 8:45 AM)   [facility] Louis Tomlin,    [facility]    [address] [address]   [phone]              Who to contact     If you have questions or need follow up information about today's clinic visit or your schedule please contact [facility] directly at [phone].  Normal or non-critical lab and imaging results will be communicated to you by MyChart, letter or phone within 4 business days after the clinic has received the results. If you do not hear from us within 7 days, please contact the clinic through MyChart or phone. If you have a critical or abnormal lab result, we will notify you by phone as soon as possible.  Submit refill requests through [employer] or call your pharmacy and they will forward the refill request to us. Please allow 3 business days for your refill to be completed.          Additional Information About Your Visit        MyChart Information     [employer] lets you send messages to your doctor, view your test results, renew your prescriptions, schedule appointments and more. To sign up, go to [email or web address] . Click on \"Log in\" on the left side of the screen, which will take you to the Welcome page. Then click on \"Sign up Now\" on the right side of the page.     You will be asked to enter the access code listed below, as well as some personal information. Please follow the directions to create your " "username and password.     Your access code is: [email or web address]  Expires: 2017 10:02 AM     Your access code will  in 90 days. If you need help or a new code, please call your [facility] or [phone].        Care EveryWhere ID     This is your Care EveryWhere ID. This could be used by other organizations to access your [place] medical records  [id number]        Your Vitals Were     Pulse Temperature Height BMI (Body Mass Index)          60 97.5  F (36.4  C) 5' 8\" (1.727 m) 30.41 kg/m2         Blood Pressure from Last 3 Encounters:   04/10/17 112/60   17 114/70   07/24/15 120/70    Weight from Last 3 Encounters:   04/10/17 200 lb (90.7 kg)   17 200 lb (90.7 kg)   07/24/15 208 lb (94.3 kg)              Today, you had the following     No orders found for display         Today's Medication Changes          These changes are accurate as of: 4/10/17  4:23 PM.  If you have any questions, ask your nurse or doctor.               Start taking these medicines.        Dose/Directions    buPROPion 150 MG 24 hr tablet   Commonly known as:  WELLBUTRIN XL   Used for:  Adjustment disorder with depressed mood   Started by:  Abdulkadir Huizar MD        1 tab daily in the  Morning for 4 days then increase to 2 tabs daily   Quantity:  60 tablet   Refills:  1         Stop taking these medicines if you haven't already. Please contact your care team if you have questions.     citalopram 40 MG tablet   Commonly known as:  celeXA   Stopped by:  Abdulkadir Huizar MD                Where to get your medicines      These medications were sent to [id number] - [place] - [address]  [address] [address]     Phone:  [phone]     buPROPion 150 MG 24 hr tablet                Primary Care Provider Office Phone # Fax #    Abdulkadir Huizar [phone]       [facility] [address]  [place] [address]        Thank you!     Thank you for choosing [place] [facility]  [facility] your [facility]. Our goal is always to provide you with excellent care. Hearing back from our patients is one way we can continue to improve our services. Please take a few minutes to complete the written survey that you may receive in the mail after your visit with us. Thank you!             Your Updated Medication List - Protect others around you: Learn how to safely use, store and throw away your medicines at www.disposemymeds.org.          This list is accurate as of: 4/10/17  4:23 PM.  Always use your most recent med list.                   Brand Name Dispense Instructions for use    aspirin 325 MG tablet     180 tablet    Take 1 tablet (325 mg) by mouth daily       atorvastatin 40 MG tablet    LIPITOR    90 tablet    TAKE 1 TABLET DAILY BY MOUT H - GENERIC FOR LIPITOR       buPROPion 150 MG 24 hr tablet    WELLBUTRIN XL    60 tablet    1 tab daily in the  Morning for 4 days then increase to 2 tabs daily       lisinopril 10 MG tablet    PRINIVIL/ZESTRIL    90 tablet    TAKE 1 TABLET DAILY BY MOUT H - GENERIC FOR ZESTRIL       metoprolol 25 MG tablet    LOPRESSOR    180 tablet    TAKE 1 TABLET BY MOUTH TWIC E A DAY       nitroglycerin 0.4 MG sublingual tablet    NITROSTAT    25 tablet    PLACE 1 TABLET UNDER THE TONGUE EVERY FIVE MINUTES AS NEEDED FOR CHEST PAIN. DO NOT CRUSH; MAXIMUM OF 3 DOSES IN 15 MINUTES.       OMEGA-3 FISH OIL PO      Take 1 g by mouth       omeprazole 40 MG capsule    priLOSEC    90 capsule    TAKE 1 CAPSULE BY MOUTH ONCE DAILY. TAKE 30 TO 60 MINUTES BEFORE A MEAL.

## 2017-04-11 DIAGNOSIS — F43.21 ADJUSTMENT DISORDER WITH DEPRESSED MOOD: ICD-10-CM

## 2017-04-11 ASSESSMENT — ANXIETY QUESTIONNAIRES: GAD7 TOTAL SCORE: 8

## 2017-04-11 ASSESSMENT — PATIENT HEALTH QUESTIONNAIRE - PHQ9: SUM OF ALL RESPONSES TO PHQ QUESTIONS 1-9: 5

## 2017-04-11 NOTE — TELEPHONE ENCOUNTER
Last visit: 4.10.17 - Medication was just prescribed yesterday at appointment.  Insurance will not cover 2-150 mg tablets as you ordered.  Pended 300 mg tablet.  Is patient able to do 1/2 300 mg tab for 4 days? Please review directions and advise. Thank you

## 2017-04-12 RX ORDER — BUPROPION HYDROCHLORIDE 300 MG/1
TABLET ORAL
Qty: 30 TABLET | Refills: 1 | Status: SHIPPED | OUTPATIENT
Start: 2017-04-12 | End: 2017-05-08

## 2017-04-16 DIAGNOSIS — F43.21 ADJUSTMENT DISORDER WITH DEPRESSED MOOD: ICD-10-CM

## 2017-04-17 RX ORDER — CITALOPRAM HYDROBROMIDE 40 MG/1
TABLET ORAL
Qty: 30 TABLET | Refills: 5 | Status: SHIPPED | OUTPATIENT
Start: 2017-04-17 | End: 2017-05-08

## 2017-05-08 ENCOUNTER — OFFICE VISIT (OUTPATIENT)
Dept: FAMILY MEDICINE | Facility: OTHER | Age: 62
End: 2017-05-08
Attending: FAMILY MEDICINE
Payer: COMMERCIAL

## 2017-05-08 VITALS
WEIGHT: 205 LBS | RESPIRATION RATE: 17 BRPM | OXYGEN SATURATION: 95 % | SYSTOLIC BLOOD PRESSURE: 126 MMHG | BODY MASS INDEX: 31.17 KG/M2 | DIASTOLIC BLOOD PRESSURE: 74 MMHG | TEMPERATURE: 98.3 F | HEART RATE: 74 BPM

## 2017-05-08 DIAGNOSIS — F43.21 ADJUSTMENT DISORDER WITH DEPRESSED MOOD: ICD-10-CM

## 2017-05-08 PROCEDURE — 99213 OFFICE O/P EST LOW 20 MIN: CPT | Performed by: FAMILY MEDICINE

## 2017-05-08 RX ORDER — BUPROPION HYDROCHLORIDE 300 MG/1
TABLET ORAL
Qty: 90 TABLET | Refills: 1 | Status: SHIPPED | OUTPATIENT
Start: 2017-05-08 | End: 2017-11-01

## 2017-05-08 ASSESSMENT — ANXIETY QUESTIONNAIRES
1. FEELING NERVOUS, ANXIOUS, OR ON EDGE: NOT AT ALL
7. FEELING AFRAID AS IF SOMETHING AWFUL MIGHT HAPPEN: NOT AT ALL
6. BECOMING EASILY ANNOYED OR IRRITABLE: MORE THAN HALF THE DAYS
3. WORRYING TOO MUCH ABOUT DIFFERENT THINGS: NOT AT ALL
IF YOU CHECKED OFF ANY PROBLEMS ON THIS QUESTIONNAIRE, HOW DIFFICULT HAVE THESE PROBLEMS MADE IT FOR YOU TO DO YOUR WORK, TAKE CARE OF THINGS AT HOME, OR GET ALONG WITH OTHER PEOPLE: NOT DIFFICULT AT ALL
GAD7 TOTAL SCORE: 4
5. BEING SO RESTLESS THAT IT IS HARD TO SIT STILL: SEVERAL DAYS
2. NOT BEING ABLE TO STOP OR CONTROL WORRYING: NOT AT ALL

## 2017-05-08 ASSESSMENT — PATIENT HEALTH QUESTIONNAIRE - PHQ9: 5. POOR APPETITE OR OVEREATING: SEVERAL DAYS

## 2017-05-08 ASSESSMENT — PAIN SCALES - GENERAL: PAINLEVEL: NO PAIN (0)

## 2017-05-08 NOTE — MR AVS SNAPSHOT
"              After Visit Summary   5/8/2017    Anthony Steele    MRN: [id number]           Patient Information     Date Of Birth          1955        Visit Information        Provider Department      5/8/2017 4:00 PM Abdulkadir Huizar MD [facility]        Today's Diagnoses     Adjustment disorder with depressed mood          Care Instructions    Have a great summer..        Follow-ups after your visit        Your next 10 appointments already scheduled     May 16, 2017  2:00 PM CDT   (Arrive by 1:45 PM)   [facility]lin,    [facility]    [address] [address]   [phone]              Who to contact     If you have questions or need follow up information about today's clinic visit or your schedule please contact [facility] directly at [phone].  Normal or non-critical lab and imaging results will be communicated to you by MyChart, letter or phone within 4 business days after the clinic has received the results. If you do not hear from us within 7 days, please contact the clinic through MyChart or phone. If you have a critical or abnormal lab result, we will notify you by phone as soon as possible.  Submit refill requests through [employer] or call your pharmacy and they will forward the refill request to us. Please allow 3 business days for your refill to be completed.          Additional Information About Your Visit        MyChart Information     [employer] lets you send messages to your doctor, view your test results, renew your prescriptions, schedule appointments and more. To sign up, go to [email or web address] . Click on \"Log in\" on the left side of the screen, which will take you to the Welcome page. Then click on \"Sign up Now\" on the right side of the page.     You will be asked to enter the access code listed below, as well as some personal information. Please follow the directions to create your " username and password.     Your access code is: [email or web address]  Expires: 2017 10:02 AM     Your access code will  in 90 days. If you need help or a new code, please call your [facility] or [phone].        Care EveryWhere ID     This is your Care EveryWhere ID. This could be used by other organizations to access your [place] medical records  [id number]        Your Vitals Were     Pulse Temperature Respirations Pulse Oximetry BMI (Body Mass Index)       74 98.3  F (36.8  C) 17 95% 31.17 kg/m2        Blood Pressure from Last 3 Encounters:   17 126/74   04/10/17 112/60   17 114/70    Weight from Last 3 Encounters:   17 205 lb (93 kg)   04/10/17 200 lb (90.7 kg)   17 200 lb (90.7 kg)              Today, you had the following     No orders found for display         Today's Medication Changes          These changes are accurate as of: 17  4:23 PM.  If you have any questions, ask your nurse or doctor.               Stop taking these medicines if you haven't already. Please contact your care team if you have questions.     citalopram 40 MG tablet   Commonly known as:  celeXA   Stopped by:  Abdulkadri Huizar MD                Where to get your medicines      These medications were sent to [id number] - [place] - [address] [facility] [address] [place] [address]     Phone:  [phone]     buPROPion 300 MG 24 hr tablet                Primary Care Provider Office Phone # Fax #    Abdulkadir Huizar [phone]       [facility] [address]  [place] [address]        Thank you!     Thank you for choosing [facility]  for your care. Our goal is always to provide you with excellent care. Hearing back from our patients is one way we can continue to improve our services. Please take a few minutes to complete the written survey that you may receive in the mail after your visit with us. Thank you!             Your Updated  Medication List - Protect others around you: Learn how to safely use, store and throw away your medicines at www.disposemymeds.org.          This list is accurate as of: 5/8/17  4:23 PM.  Always use your most recent med list.                   Brand Name Dispense Instructions for use    aspirin 325 MG tablet     180 tablet    Take 1 tablet (325 mg) by mouth daily       atorvastatin 40 MG tablet    LIPITOR    90 tablet    TAKE 1 TABLET DAILY BY MOUT H - GENERIC FOR LIPITOR       buPROPion 300 MG 24 hr tablet    WELLBUTRIN XL    90 tablet    1 tabs orally  daily       lisinopril 10 MG tablet    PRINIVIL/ZESTRIL    90 tablet    TAKE 1 TABLET DAILY BY MOUT H - GENERIC FOR ZESTRIL       metoprolol 25 MG tablet    LOPRESSOR    180 tablet    TAKE 1 TABLET BY MOUTH TWIC E A DAY       nitroglycerin 0.4 MG sublingual tablet    NITROSTAT    25 tablet    PLACE 1 TABLET UNDER THE TONGUE EVERY FIVE MINUTES AS NEEDED FOR CHEST PAIN. DO NOT CRUSH; MAXIMUM OF 3 DOSES IN 15 MINUTES.       OMEGA-3 FISH OIL PO      Take 1 g by mouth       omeprazole 40 MG capsule    priLOSEC    90 capsule    TAKE 1 CAPSULE BY MOUTH ONCE DAILY. TAKE 30 TO 60 MINUTES BEFORE A MEAL.

## 2017-05-08 NOTE — PROGRESS NOTES
SUBJECTIVE:                                                    Anthony Steele is a 61 year old male who presents to clinic today for the following health issues:        Depression Followup    Status since last visit: Improved     See PHQ-9 for current symptoms.  Other associated symptoms: None    Complicating factors:   Significant life event:  No   Current substance abuse:  Alcohol  Anxiety or Panic symptoms:  No    PHQ-9  English PHQ-9   Any Language        PHQ-9 SCORE 3/6/2017 4/10/2017 5/8/2017   Total Score - - -   Total Score 4 5 4     MACEY-7 SCORE 3/6/2017 4/10/2017 5/8/2017   Total Score 6 8 4     Doing great - feels much better- normal self.   No side effects         Amount of exercise or physical activity: None    Problems taking medications regularly: No    Medication side effects: dry nose     Diet: regular (no restrictions)          Problem list and histories reviewed & adjusted, as indicated.  Additional history: as documented        ROS:  C: NEGATIVE for fever, chills, change in weight    OBJECTIVE:                                                    /74  Pulse 74  Temp 98.3  F (36.8  C)  Resp 17  Wt 205 lb (93 kg)  SpO2 95%  BMI 31.17 kg/m2  Body mass index is 31.17 kg/(m^2).   GENERAL: healthy, alert, well nourished, well hydrated, no distress  PSYCH: Alert and oriented times 3; speech- coherent , normal rate and volume; able to articulate logical thoughts, able to abstract reason, no tangential thoughts, no hallucinations or delusions, affect- normal         ASSESSMENT/PLAN:                                                    (F43.21) Adjustment disorder with depressed mood  Comment: doing great.   Plan: buPROPion (WELLBUTRIN XL) 300 MG 24 hr tablet        Continue current medications and behavioral changes.   F/u in 6 months. Symptomatic treatment was discussed along when patient should call and/or come back into the clinic or go to ER/Urgent care. All questions answered.           See  Patient Instructions    Abdulkadir Huizar MD  [facility]

## 2017-05-08 NOTE — NURSING NOTE
"Chief Complaint   Patient presents with     Depression       Initial /74  Pulse 74  Temp 98.3  F (36.8  C)  Resp 17  Wt 205 lb (93 kg)  SpO2 95%  BMI 31.17 kg/m2 Estimated body mass index is 31.17 kg/(m^2) as calculated from the following:    Height as of 4/10/17: 5' 8\" (1.727 m).    Weight as of this encounter: 205 lb (93 kg).  Medication Reconciliation: complete  "

## 2017-05-09 ASSESSMENT — ANXIETY QUESTIONNAIRES: GAD7 TOTAL SCORE: 4

## 2017-05-09 ASSESSMENT — PATIENT HEALTH QUESTIONNAIRE - PHQ9: SUM OF ALL RESPONSES TO PHQ QUESTIONS 1-9: 4

## 2017-05-16 ENCOUNTER — OFFICE VISIT (OUTPATIENT)
Dept: SURGERY | Facility: OTHER | Age: 62
End: 2017-05-16
Attending: FAMILY MEDICINE
Payer: COMMERCIAL

## 2017-05-16 ENCOUNTER — HOSPITAL ENCOUNTER (EMERGENCY)
Facility: HOSPITAL | Age: 62
Discharge: HOME | End: 2017-05-16
Attending: PHYSICIAN ASSISTANT | Admitting: PHYSICIAN ASSISTANT
Payer: COMMERCIAL

## 2017-05-16 VITALS
BODY MASS INDEX: 29.82 KG/M2 | DIASTOLIC BLOOD PRESSURE: 66 MMHG | RESPIRATION RATE: 18 BRPM | SYSTOLIC BLOOD PRESSURE: 108 MMHG | HEIGHT: 67 IN | WEIGHT: 190 LBS | HEART RATE: 80 BPM | TEMPERATURE: 98.1 F | OXYGEN SATURATION: 96 %

## 2017-05-16 VITALS
RESPIRATION RATE: 20 BRPM | TEMPERATURE: 96.2 F | OXYGEN SATURATION: 98 % | DIASTOLIC BLOOD PRESSURE: 95 MMHG | HEART RATE: 75 BPM | SYSTOLIC BLOOD PRESSURE: 153 MMHG

## 2017-05-16 DIAGNOSIS — S82.831A CLOSED FRACTURE OF DISTAL END OF RIGHT FIBULA, UNSPECIFIED FRACTURE MORPHOLOGY, INITIAL ENCOUNTER: ICD-10-CM

## 2017-05-16 DIAGNOSIS — Z79.82 ASPIRIN LONG-TERM USE: ICD-10-CM

## 2017-05-16 DIAGNOSIS — I25.10 CORONARY ARTERY DISEASE INVOLVING NATIVE HEART WITHOUT ANGINA PECTORIS, UNSPECIFIED VESSEL OR LESION TYPE: ICD-10-CM

## 2017-05-16 DIAGNOSIS — Z78.9 ALCOHOL USE: ICD-10-CM

## 2017-05-16 DIAGNOSIS — F17.200 TOBACCO DEPENDENCE: ICD-10-CM

## 2017-05-16 DIAGNOSIS — K40.20 BILATERAL INGUINAL HERNIA WITHOUT OBSTRUCTION OR GANGRENE, RECURRENCE NOT SPECIFIED: Primary | ICD-10-CM

## 2017-05-16 PROCEDURE — 25000132 ZZH RX MED GY IP 250 OP 250 PS 637: Performed by: PHYSICIAN ASSISTANT

## 2017-05-16 PROCEDURE — 73610 X-RAY EXAM OF ANKLE: CPT | Mod: TC,RT

## 2017-05-16 PROCEDURE — 99213 OFFICE O/P EST LOW 20 MIN: CPT

## 2017-05-16 PROCEDURE — 99213 OFFICE O/P EST LOW 20 MIN: CPT | Performed by: PHYSICIAN ASSISTANT

## 2017-05-16 PROCEDURE — 99243 OFF/OP CNSLTJ NEW/EST LOW 30: CPT | Performed by: SURGERY

## 2017-05-16 RX ORDER — OXYCODONE AND ACETAMINOPHEN 5; 325 MG/1; MG/1
1 TABLET ORAL ONCE
Status: COMPLETED | OUTPATIENT
Start: 2017-05-16 | End: 2017-05-16

## 2017-05-16 RX ORDER — HYDROCODONE BITARTRATE AND ACETAMINOPHEN 5; 325 MG/1; MG/1
1-2 TABLET ORAL EVERY 8 HOURS PRN
Qty: 6 TABLET | Refills: 0 | Status: SHIPPED | OUTPATIENT
Start: 2017-05-16 | End: 2018-05-21

## 2017-05-16 RX ADMIN — OXYCODONE HYDROCHLORIDE AND ACETAMINOPHEN 1 TABLET: 5; 325 TABLET ORAL at 16:58

## 2017-05-16 ASSESSMENT — ENCOUNTER SYMPTOMS
PSYCHIATRIC NEGATIVE: 1
JOINT SWELLING: 1
ARTHRALGIAS: 1
RESPIRATORY NEGATIVE: 1
CONSTITUTIONAL NEGATIVE: 1
CARDIOVASCULAR NEGATIVE: 1

## 2017-05-16 ASSESSMENT — PAIN SCALES - GENERAL: PAINLEVEL: NO PAIN (0)

## 2017-05-16 NOTE — ED PROVIDER NOTES
History     Chief Complaint   Patient presents with     Ankle Pain     The history is provided by the patient and the spouse. No  was used.     Anthony Steele is a 61 year old male who presents with acute injury to right ankle.  VITALY: Pt was walking along a hill picking wild asparagus in the rain PTA. His left leg slipped down the embankment leaving his right foot behind in a plantarflexed and inverted position.   Pain is described as it hurts. He reports a snapping sensation followed by swelling. He had to crawl to his vehicle and it hurt to simply use the gas/brake pedals to get home. Spouse drove him here from home. Treatments tried thus far include Ice.   Patient denies any additional injury.       I have reviewed the Medications, Allergies, Past Medical and Surgical History, and Social History in the Epic system.    Review of Systems   Constitutional: Negative.    Respiratory: Negative.    Cardiovascular: Negative.    Musculoskeletal: Positive for arthralgias, gait problem and joint swelling.   Skin: Negative.    Psychiatric/Behavioral: Negative.        Physical Exam   BP: 153/95  Pulse: 75  Temp: 96.2  F (35.7  C)  Resp: 20  SpO2: 98 %  Physical Exam   Constitutional: He is oriented to person, place, and time. He appears well-developed and well-nourished. No distress.   Cardiovascular: Normal rate.    Pulmonary/Chest: Effort normal.   Musculoskeletal:        Right ankle: He exhibits decreased range of motion (due to pain), swelling and ecchymosis. Tenderness. Lateral malleolus tenderness found. Achilles tendon normal.        Feet:    Neurological: He is alert and oriented to person, place, and time.   Skin: Skin is warm and dry.   Psychiatric: He has a normal mood and affect.   Nursing note and vitals reviewed.      ED Course     ED Course     Procedures  I personally reviewed Xrays and there is a Fx of the distal fibula. Radiology review is pending and patient will be contacted with  results if there is any necessary change in plan.      Assessments & Plan (with Medical Decision Making)     I have reviewed the nursing notes.    I have reviewed the findings, diagnosis, plan and need for follow up with the patient.    New Prescriptions    HYDROCODONE-ACETAMINOPHEN (NORCO) 5-325 MG PER TABLET    Take 1-2 tablets by mouth every 8 hours as needed for moderate to severe pain       Final diagnoses:   Closed fracture of distal end of right fibula, unspecified fracture morphology, initial encounter   Pt referred to OA per his request for f/u in 3-5 days. Will RICE and CAM walker with crutches until f/u. Rotate ibu/tylenol for pain, lortab for pain > 5/10 for 2-3 days. Will seek attention prior to ortho with pain/swelling out of proportion. Patient and spouse verbally educated and given appropriate education sheets for each of the diagnoses and has no questions.    Austin Clifton PA-C   5/16/2017   5:45 PM    5/16/2017   [facility]     Austin Clifton PA  05/16/17 9435

## 2017-05-16 NOTE — MR AVS SNAPSHOT
After Visit Summary   5/16/2017    Anthony Steele    MRN: [id number]           Patient Information     Date Of Birth          1955        Visit Information        Provider Department      5/16/2017 2:00 PM Louis Tomlin, DO [facility] [place]        Today's Diagnoses     Bilateral inguinal hernia without obstruction or gangrene, recurrence not specified    -  1    Coronary artery disease involving native heart without angina pectoris, unspecified vessel or lesion type        Tobacco dependence        Alcohol use        Aspirin long-term use          Care Instructions    Thank you for allowing Dr. Tomlin and our surgical team to participate in your care.  If you have a scheduling or an appointment question please contact [facility] Unit Coordinator at her direct line [phone].   ALL nursing questions or concerns can be directed to Tahmina at: [phone]     You are scheduled for a: bilateral inguinal hernia repair  Your procedure date is: 6/21/17  Your post-op appointment is scheduled on: 2 weeks after your procedure    HOW TO PREPARE-      You need to have a scheduled Pre-Op with your primary care physician within 30 days of your scheduled surgery. This was scheduled already on the day of your consult with the surgeon. You should have a reminder call.      You need a friend or family member available to drive you home AND stay with you for 24 hours after you leave the hospital. You will not be allowed to drive yourself. IF you need to take a taxi or the bus you MUST have a responsible person to ride with you. YOUR PROCEDURE WILL BE CANCELLED IF YOU DO NOT HAVE A RESPONSIBLE ADULT TO DRIVE YOU HOME.       You CANNOT have anything to eat or drink after midnight the night before your surgery, ncluding water and coffee. Your stomach needs to be completely empty. Do NOT chew gum, suck on hard candy, or smoke. You can brush your teeth the morning of surgery.       You need to  call our Surgery Education Nurses 1-2 weeks prior to your surgery date at  [phone] or toll free [phone]. Please have you medication and allergy lists ready.      Stop your aspirin or other NSAIDs(Ibuprofen, Motrin, Aleve, Celebrex, Naproxen, etc...) 7 days before your surgery.      Hospital admitting will call you the day before your surgery with your arrival time. If you are scheduled on a Monday admitting will call you the Friday before.      Please call your primary care physician if you should become ill within 24 hours of scheduled surgery. (ex.vomiting, diarrhea, fever)          You will need to wash the night before AND the morning of you procedure with the supplied Hibiclens. Wash your Surgical area with your bare hands, apply friction and rinse. KEEP IT AWAY FROM YOUR EYES, EARS, NOSE AND MOUTH.     Surgery Education will contact you the day before your procedure between the hours of noon and 5 pm with the time you need to register in admitting at the hospital. Call Tahmina with any questions [phone]'    You must bump down your aspirin to an 81 mg for 10 days prior to your procedure,     You will also need to take your metoprolol the morning of surgery with just a sip of water to get the medication down.     Once you return home from your procedure you may resume all your medications like you normally would.          Follow-ups after your visit        Your next 10 appointments already scheduled     Nov 01, 2017  8:30 AM CDT   (Arrive by 8:15 AM)   SHORT with Abdulkadir Huizar MD   [facility] [place] ([facility])    [facility] [place] [address]   [phone]              Who to contact     If you have questions or need follow up information about today's clinic visit or your schedule please contact [facility] directly at [phone].  Normal or non-critical lab and imaging results will be communicated to you by MyChart, letter or phone within 4 business days  "after the clinic has received the results. If you do not hear from us within 7 days, please contact the clinic through [employer] or phone. If you have a critical or abnormal lab result, we will notify you by phone as soon as possible.  Submit refill requests through [employer] or call your pharmacy and they will forward the refill request to us. Please allow 3 business days for your refill to be completed.          Additional Information About Your Visit        [employer] Information     [employer] lets you send messages to your doctor, view your test results, renew your prescriptions, schedule appointments and more. To sign up, go to [email or web address] . Click on \"Log in\" on the left side of the screen, which will take you to the Welcome page. Then click on \"Sign up Now\" on the right side of the page.     You will be asked to enter the access code listed below, as well as some personal information. Please follow the directions to create your username and password.     Your access code is: [email or web address]  Expires: 2017 10:02 AM     Your access code will  in 90 days. If you need help or a new code, please call your [facility] clinic or [phone].        Care EveryWhere ID     This is your Care EveryWhere ID. This could be used by other organizations to access your [facility] medical records  [id number]        Your Vitals Were     Pulse Temperature Respirations Height Pulse Oximetry BMI (Body Mass Index)    80 98.1  F (36.7  C) (Tympanic) 18 5' 7\" (1.702 m) 96% 29.76 kg/m2       Blood Pressure from Last 3 Encounters:   17 108/66   17 126/74   04/10/17 112/60    Weight from Last 3 Encounters:   17 190 lb (86.2 kg)   17 205 lb (93 kg)   04/10/17 200 lb (90.7 kg)              Today, you had the following     No orders found for display       Primary Care Provider Office Phone # Fax #    Abdulkadir Huizar [phone]       [facility] [address] [place] [address] [place] " [address]        Thank you!     Thank you for choosing [facility]  for your [facility]. Our goal is always to provide you with excellent care. Hearing back from our patients is one way we can continue to improve our services. Please take a few minutes to complete the written survey that you may receive in the mail after your visit with us. Thank you!             Your Updated Medication List - Protect others around you: Learn how to safely use, store and throw away your medicines at www.disposemymeds.org.          This list is accurate as of: 5/16/17  2:43 PM.  Always use your most recent med list.                   Brand Name Dispense Instructions for use    aspirin 325 MG tablet     180 tablet    Take 1 tablet (325 mg) by mouth daily       atorvastatin 40 MG tablet    LIPITOR    90 tablet    TAKE 1 TABLET DAILY BY MOUT H - GENERIC FOR LIPITOR       buPROPion 300 MG 24 hr tablet    WELLBUTRIN XL    90 tablet    1 tabs orally  daily       lisinopril 10 MG tablet    PRINIVIL/ZESTRIL    90 tablet    TAKE 1 TABLET DAILY BY MOUT H - GENERIC FOR ZESTRIL       metoprolol 25 MG tablet    LOPRESSOR    180 tablet    TAKE 1 TABLET BY MOUTH TWIC E A DAY       nitroglycerin 0.4 MG sublingual tablet    NITROSTAT    25 tablet    PLACE 1 TABLET UNDER THE TONGUE EVERY FIVE MINUTES AS NEEDED FOR CHEST PAIN. DO NOT CRUSH; MAXIMUM OF 3 DOSES IN 15 MINUTES.       OMEGA-3 FISH OIL PO      Take 1 g by mouth       omeprazole 40 MG capsule    priLOSEC    90 capsule    TAKE 1 CAPSULE BY MOUTH ONCE DAILY. TAKE 30 TO 60 MINUTES BEFORE A MEAL.

## 2017-05-16 NOTE — LETTER
[facility]  [address]  [place] [address] [phone]    May 16, 2017    Anthony Steele  [address]  [facility] [id number] [phone] (home)     : 1955      To Whom it may concern:    Anthony Steele was seen in our Emergency Department today, May 16, 2017 due to acute injury.   He will be unable to weight bear on his right foot until released by specialty care.           Sincerely,      Austin Clifton PA-C   2017   5:36 PM

## 2017-05-16 NOTE — ED NOTES
Pt presents with pain to right ankle after sliding down a hillside he heard a snap and then instantly felt numb. Cms intact to right foot

## 2017-05-16 NOTE — DISCHARGE INSTRUCTIONS
- Rest, ice, compression, elevation  - Boot and crutches until you see ortho Friday/Monday.   - Rotate ibuprofen and tylenol every 3-6 hrs for 2-3 days  - May use pain pills for pain > 5/10.

## 2017-05-16 NOTE — ED AVS SNAPSHOT
[facility]    [address] [address]    Phone:  [phone]                                       Anthony Steele   MRN: [id number]    Department:  [facility]   Date of Visit:  5/16/2017           Patient Information     Date Of Birth          1955        Your diagnoses for this visit were:     Closed fracture of distal end of right fibula, unspecified fracture morphology, initial encounter        You were seen by Austin Clifton PA.      Follow-up Information     Please follow up.    Why:  ortho friday/Monday        Discharge Instructions       - Rest, ice, compression, elevation  - Boot and crutches until you see ortho Friday/Monday.   - Rotate ibuprofen and tylenol every 3-6 hrs for 2-3 days  - May use pain pills for pain > 5/10.       Discharge References/Attachments     ANKLE FRACTURE, DISTAL FIBULA (ENGLISH)      Future Appointments        Provider Department Dept Phone Center    6/7/2017 3:00 PM Abdulkadir Huizar MD [facility] [phone] [facility]    11/1/2017 8:30 AM Abdulkadir Huizar MD [facility] [phone] [facility]      ED Discharge Orders     ORTHOPEDICS ADULT REFERRAL       Your provider has referred you to: [facility] (per patient preference).    Please be aware that coverage of these services is subject to the terms and limitations of your health insurance plan.  Call member services at your health plan with any benefit or coverage questions.      Please bring the following to your appointment:    >>   Any x-rays, CTs or MRIs which have been performed.  Contact the facility where they were done to arrange for  prior to your scheduled appointment.    >>   List of current medications   >>   This referral request   >>   Any documents/labs given to you for this referral                     Review of your medicines      START taking        Dose / Directions Last dose taken     HYDROcodone-acetaminophen 5-325 MG per tablet   Commonly known as:  NORCO   Dose:  1-2 tablet   Quantity:  6 tablet        Take 1-2 tablets by mouth every 8 hours as needed for moderate to severe pain   Refills:  0          Our records show that you are taking the medicines listed below. If these are incorrect, please call your family doctor or clinic.        Dose / Directions Last dose taken    aspirin 325 MG tablet   Dose:  325 mg   Quantity:  180 tablet        Take 1 tablet (325 mg) by mouth daily   Refills:  0        atorvastatin 40 MG tablet   Commonly known as:  LIPITOR   Quantity:  90 tablet        TAKE 1 TABLET DAILY BY MOUT H - GENERIC FOR LIPITOR   Refills:  3        buPROPion 300 MG 24 hr tablet   Commonly known as:  WELLBUTRIN XL   Quantity:  90 tablet        1 tabs orally  daily   Refills:  1        lisinopril 10 MG tablet   Commonly known as:  PRINIVIL/ZESTRIL   Quantity:  90 tablet        TAKE 1 TABLET DAILY BY MOUT H - GENERIC FOR ZESTRIL   Refills:  3        metoprolol 25 MG tablet   Commonly known as:  LOPRESSOR   Quantity:  180 tablet        TAKE 1 TABLET BY MOUTH TWIC E A DAY   Refills:  3        nitroglycerin 0.4 MG sublingual tablet   Commonly known as:  NITROSTAT   Quantity:  25 tablet        PLACE 1 TABLET UNDER THE TONGUE EVERY FIVE MINUTES AS NEEDED FOR CHEST PAIN. DO NOT CRUSH; MAXIMUM OF 3 DOSES IN 15 MINUTES.   Refills:  5        OMEGA-3 FISH OIL PO   Dose:  1 g        Take 1 g by mouth   Refills:  0        omeprazole 40 MG capsule   Commonly known as:  priLOSEC   Quantity:  90 capsule        TAKE 1 CAPSULE BY MOUTH ONCE DAILY. TAKE 30 TO 60 MINUTES BEFORE A MEAL.   Refills:  3                Prescriptions were sent or printed at these locations (1 Prescription)                   [id number] - [place] - [id number]  [place] [address]    Telephone:  [phone]   Fax:  [phone]   Hours:                  Printed at Department/Unit printer (1 of  "1)         HYDROcodone-acetaminophen (NORCO) 5-325 MG per tablet                Procedures and tests performed during your visit     Ankle XR, G/E 3 views, right      Orders Needing Specimen Collection     None      Pending Results     Date and Time Order Name Status Description    2017 1644 Ankle XR, G/E 3 views, right In process             Pending Culture Results     No orders found from 2017 to 2017.            Thank you for choosing [facility]       Thank you for choosing [facility] for your care. Our goal is always to provide you with excellent care. Hearing back from our patients is one way we can continue to improve our services. Please take a few minutes to complete the written survey that you may receive in the mail after you visit with us. Thank you!        [employer] Information     [employer] lets you send messages to your doctor, view your test results, renew your prescriptions, schedule appointments and more. To sign up, go to [email or web address] . Click on \"Log in\" on the left side of the screen, which will take you to the Welcome page. Then click on \"Sign up Now\" on the right side of the page.     You will be asked to enter the access code listed below, as well as some personal information. Please follow the directions to create your username and password.     Your access code is: [email or web address]  Expires: 2017 10:02 AM     Your access code will  in 90 days. If you need help or a new code, please call your [facility] clinic or [phone].        Care EveryWhere ID     This is your Care EveryWhere ID. This could be used by other organizations to access your [facility] medical records  [id number]        After Visit Summary       This is your record. Keep this with you and show to your community pharmacist(s) and doctor(s) at your next visit.                  "

## 2017-05-16 NOTE — PROGRESS NOTES
"Surgery Consult Clinic Note      RE: Anthony Steele  : 1955  JERRY: 2017      Chief Complaint:  Inguinal hernias    History of Present Illness:  Mr. Steele is a very pleasant 61 year old year old male who I am seeing at the request of Dr. Huizar for evaluation of bilateral inguinal hernias and consideration for surgical management.  Noticed over a year ago.  Causes pain in the right groin with activity, lifting.  This past week had to stop helping his daughter move because of the pain.  Associated at times with bloating.  Denies nausea/vomiting changes in bowel habits.  Pain can last hours to days.  Always brought on by \"over doing activity\".  Pain radiates to his right testicle.   Had a colonoscopy in  that was positive for polyps.  Doesn't smoke cigarettes but does admit to marijuana.  Drinks alcohol on a daily basis.  History of MI with stents placed, on full strength aspirin.    Medical history:  Past Medical History:   Diagnosis Date     Acute myocardial infarction, unspecified site, episode of care unspecified 2011     Adjustment disorder with mixed anxiety and depressed mood 2012     CAD (coronary artery disease) 3/19/2013     Colon polyps      Coronary atherosclerosis of unspecified type of vessel, native or graft 2012     Displacement of cervical intervertebral disc without myelopathy      GERD (gastroesophageal reflux disease)      H/O ETOH abuse      HTN (hypertension)      Mixed hyperlipidemia 2011     Personal history of tobacco use, presenting hazards to health 2011    Tobacco abuse, in remission     Postsurgical percutaneous transluminal coronary angioplasty status 2011     Prediabetes      Status post coronary angiogram 3/19/2013     Tobacco abuse, in remission        Surgical history:  Past Surgical History:   Procedure Laterality Date     ANGIOPLASTY  [id number]    Failed angioplasty of OM vessel     ARTHROSCOPY KNEE      RT     BACK SURGERY      " C5-6 fusion     Carpal Tunnel Syndrome  2003    Resolved from Problem List  2012     COLONOSCOPY  2008    colonoscopy with polypectomy/ Repeat in 2011     COLONOSCOPY  3/24/2014    Repeat in 2019//Procedure: COLONOSCOPY;  COLONOSCOPY;  Surgeon: Jessica Washington MD;  Location: [facility]     RELEASE CARPAL TUNNEL      RT     STENT  07/2011    Myocardial Infarction; Stent placement [facility]     STENT, CORONARY, LIZZIE  2012       Family history:  Family History   Problem Relation Age of Onset     CEREBROVASCULAR DISEASE Father 48     Cause of death     DIABETES Mother      Type 2     HEART DISEASE Mother      Heart valve replacements     Hypertension Mother      Arthritis Sister        Medications:  Current Outpatient Prescriptions   Medication Sig Dispense Refill     buPROPion (WELLBUTRIN XL) 300 MG 24 hr tablet 1 tabs orally  daily 90 tablet 1     lisinopril (PRINIVIL/ZESTRIL) 10 MG tablet TAKE 1 TABLET DAILY BY MOUT H - GENERIC FOR ZESTRIL 90 tablet 3     metoprolol (LOPRESSOR) 25 MG tablet TAKE 1 TABLET BY MOUTH TWIC E A  tablet 3     omeprazole (PRILOSEC) 40 MG capsule TAKE 1 CAPSULE BY MOUTH ONCE DAILY. TAKE 30 TO 60 MINUTES BEFORE A MEAL. 90 capsule 3     atorvastatin (LIPITOR) 40 MG tablet TAKE 1 TABLET DAILY BY MOUT H - GENERIC FOR LIPITOR 90 tablet 3     nitroglycerin (NITROSTAT) 0.4 MG sublingual tablet PLACE 1 TABLET UNDER THE TONGUE EVERY FIVE MINUTES AS NEEDED FOR CHEST PAIN. DO NOT CRUSH; MAXIMUM OF 3 DOSES IN 15 MINUTES. 25 tablet 5     aspirin 325 MG tablet Take 1 tablet (325 mg) by mouth daily 180 tablet      Omega-3 Fatty Acids (OMEGA-3 FISH OIL PO) Take 1 g by mouth       Allergies:  The patientis allergic to codeine.  .  Social history:  Social History   Substance Use Topics     Smoking status: Former Smoker     Packs/day: 1.00     Years: 20.00     Smokeless tobacco: Never Used      Comment: Tried to quit: yes; longest period tobacco-free- 10 years     Alcohol use Yes      Comment: Occasionally  "    Marital status: .    Review of Systems:    Constitutional: Negative for fever, chills and weight loss.   HENT: Negative for ear pain, nosebleeds, congestion, sore throat, tinnitus and ear discharge.    Eyes: Negative for blurred vision, double vision, photophobia and pain.   Respiratory: Negative for cough, hemoptysis, shortness of breath, wheezing and stridor.    Cardiovascular: Negative for chest pain, palpitations and orthopnea.   Gastrointestinal: Negative for heartburn, nausea, vomiting, abdominal pain and blood in stool.   Genitourinary: Negative for urgency, frequency and hematuria.   Musculoskeletal: Negative for myalgias, back pain and joint pain.   Neurological: Negative for tingling, speech change and headaches.   Endo/Heme/Allergies: Does not bruise/bleed easily.   Psychiatric/Behavioral: Negative for depression, suicidal ideas and hallucinations. The patient is not nervous/anxious.    Physical Examination:  /66 (BP Location: Right arm, Patient Position: Chair, Cuff Size: Adult Regular)  Pulse 80  Temp 98.1  F (36.7  C) (Tympanic)  Resp 18  Ht 5' 7\" (1.702 m)  Wt 190 lb (86.2 kg)  SpO2 96%  BMI 29.76 kg/m2  General: AAOx4, NAD, WN/WD, ambulating without assistance  HEENT:NCAT, EOMI, PERRL Sclerae anicteric; Trachea mideline, no JVD  Chest:   Clear to auscultation bilaterally.  Cardiac: S1S2 , regular rate and rhythm without additional sounds  Abdomen: Soft, ND/NT no rebound, no guarding, small right inguinal hernia that increases in size with cough, spontaneously reduces.  Same but smaller on the left side.  Extremities: Cursory exam unremarkable.  Skin: Warm, dry, < 2 sec cap refill  Neuro: CN 2-12 grossly intact, no focal deficit, GCS 15  Psych: happy, calm, asks appropriate questions      Assessment/Plan:  #1 Bilateral inguinal hernias  #2 CAD  #3 Tobacco dependence  #4 Alcohol use  #5 Asprin use    Thank you for the consult.  Mr. Steele and I had a long and rosalina discussion " about the pathophysiology of hernias and their progression from asymptomatic, to symptomatic to incarceration and strangulation.  The patient has been educated on the signs and symptoms of incarceration and strangulation and instructed to seek medical attention immediately. He voiced understanding that this represents a true medical emergency.  There were no barriers to patient education. The risk, benefits and alternatives have been fully discussed.  These include, but aren't limited to, the risk of bleeding, infection, recurrence, injury to adjacent structures and chronic pain.  Despite all this, the patient wishes to proceed with surgery, which I think is reasonable given how symptomatic he is.             [facility] and [address], [address]  [place] [address]    Referring Provider:  [place] [address]     Primary Care Provider:  Abdulkadir Huizar

## 2017-05-16 NOTE — ED AVS SNAPSHOT
[facility]    [address] [place] [address]    Phone:  [phone]                                       Anthony Steele   MRN: [id number]    Department:  [facility]   Date of Visit:  5/16/2017           After Visit Summary Signature Page     I have received my discharge instructions, and my questions have been answered. I have discussed any challenges I see with this plan with the nurse or doctor.    ..........................................................................................................................................  Patient/Patient Representative Signature      ..........................................................................................................................................  Patient Representative Print Name and Relationship to Patient    ..................................................               ................................................  Date                                            Time    ..........................................................................................................................................  Reviewed by Signature/Title    ...................................................              ..............................................  Date                                                            Time

## 2017-05-16 NOTE — PATIENT INSTRUCTIONS
Thank you for allowing Dr. Tomlin and our surgical team to participate in your care.  If you have a scheduling or an appointment question please contact [facility] Coordinator at her direct line [phone].   ALL nursing questions or concerns can be directed to Tahmina at: [phone]     You are scheduled for a: bilateral inguinal hernia repair  Your procedure date is: 6/21/17  Your post-op appointment is scheduled on: 2 weeks after your procedure    HOW TO PREPARE-      You need to have a scheduled Pre-Op with your primary care physician within 30 days of your scheduled surgery. This was scheduled already on the day of your consult with the surgeon. You should have a reminder call.      You need a friend or family member available to drive you home AND stay with you for 24 hours after you leave the hospital. You will not be allowed to drive yourself. IF you need to take a taxi or the bus you MUST have a responsible person to ride with you. YOUR PROCEDURE WILL BE CANCELLED IF YOU DO NOT HAVE A RESPONSIBLE ADULT TO DRIVE YOU HOME.       You CANNOT have anything to eat or drink after midnight the night before your surgery, ncluding water and coffee. Your stomach needs to be completely empty. Do NOT chew gum, suck on hard candy, or smoke. You can brush your teeth the morning of surgery.       You need to call our Surgery Education Nurses 1-2 weeks prior to your surgery date at  [phone] or toll free [phone]. Please have you medication and allergy lists ready.      Stop your aspirin or other NSAIDs(Ibuprofen, Motrin, Aleve, Celebrex, Naproxen, etc...) 7 days before your surgery.      Hospital admitting will call you the day before your surgery with your arrival time. If you are scheduled on a Monday admitting will call you the Friday before.      Please call your primary care physician if you should become ill within 24 hours of scheduled surgery. (ex.vomiting, diarrhea, fever)          You  will need to wash the night before AND the morning of you procedure with the supplied Hibiclens. Wash your Surgical area with your bare hands, apply friction and rinse. KEEP IT AWAY FROM YOUR EYES, EARS, NOSE AND MOUTH.     Surgery Education will contact you the day before your procedure between the hours of noon and 5 pm with the time you need to register in admitting at the hospital. Call Tahmina with any questions [phone]'    You must bump down your aspirin to an 81 mg for 10 days prior to your procedure,     You will also need to take your metoprolol the morning of surgery with just a sip of water to get the medication down.     Once you return home from your procedure you may resume all your medications like you normally would.

## 2017-05-16 NOTE — NURSING NOTE
"Chief Complaint   Patient presents with     Consult     Dr. Huizar referring. Bilateral inguinal hernias. Ongoing issue.       Initial /66 (BP Location: Right arm, Patient Position: Chair, Cuff Size: Adult Regular)  Pulse 80  Temp 98.1  F (36.7  C) (Tympanic)  Resp 18  Ht 5' 7\" (1.702 m)  Wt 190 lb (86.2 kg)  SpO2 96%  BMI 29.76 kg/m2 Estimated body mass index is 29.76 kg/(m^2) as calculated from the following:    Height as of this encounter: 5' 7\" (1.702 m).    Weight as of this encounter: 190 lb (86.2 kg).  Medication Reconciliation: complete   Munira Blackburn LPN    "

## 2017-05-17 ENCOUNTER — HOSPITAL ENCOUNTER (OUTPATIENT)
Facility: HOSPITAL | Age: 62
End: 2017-05-17
Attending: SURGERY | Admitting: SURGERY
Payer: COMMERCIAL

## 2017-05-17 ENCOUNTER — TELEPHONE (OUTPATIENT)
Dept: SURGERY | Facility: OTHER | Age: 62
End: 2017-05-17

## 2017-05-17 NOTE — TELEPHONE ENCOUNTER
Patient called and wanted to let Dr Tomlin and Tahmina know that after his clinic appointment yesterday (5-16-17), he stopped on the way home to pick asparagus on the side of the road, slipped and fell and ended up breaking his ankle.  He is on the schedule to see a doctor at [facility] tomorrow and will keep this office posted as far as what the future may hold regarding his hernia surgery.  Writer thanked patient for keeping us informed.

## 2017-05-17 NOTE — PROGRESS NOTES
Adult orthopedic referral made, pt to be seen by a physician from [facility] on 5/22/17. CAM and crutches.

## 2017-05-18 ENCOUNTER — TRANSFERRED RECORDS (OUTPATIENT)
Dept: HEALTH INFORMATION MANAGEMENT | Facility: HOSPITAL | Age: 62
End: 2017-05-18

## 2017-06-01 ENCOUNTER — TRANSFERRED RECORDS (OUTPATIENT)
Dept: HEALTH INFORMATION MANAGEMENT | Facility: HOSPITAL | Age: 62
End: 2017-06-01

## 2017-06-29 ENCOUNTER — TRANSFERRED RECORDS (OUTPATIENT)
Dept: HEALTH INFORMATION MANAGEMENT | Facility: HOSPITAL | Age: 62
End: 2017-06-29

## 2017-07-10 ENCOUNTER — HOSPITAL ENCOUNTER (OUTPATIENT)
Dept: PHYSICAL THERAPY | Facility: HOSPITAL | Age: 62
Setting detail: THERAPIES SERIES
End: 2017-07-10
Attending: FAMILY MEDICINE
Payer: COMMERCIAL

## 2017-07-10 PROCEDURE — 40000718 ZZHC STATISTIC PT DEPARTMENT ORTHO VISIT

## 2017-07-10 PROCEDURE — 97110 THERAPEUTIC EXERCISES: CPT | Mod: GP

## 2017-07-10 PROCEDURE — 97161 PT EVAL LOW COMPLEX 20 MIN: CPT | Mod: GP

## 2017-07-10 NOTE — PROGRESS NOTES
07/10/17 0805   General Information   Type of Visit Initial OP Ortho PT Evaluation   Start of Care Date 07/10/17   Referring Physician Dr. Amato   Patient/Family Goals Statement to not bother him forever   Orders Evaluate and Treat   Date of Order 06/29/17   Insurance Type [employer]   Insurance Comments/Visits Authorized no more than 60 visits   Medical Diagnosis right ankle fracture   Surgical/Medical history reviewed Yes   Weight-Bearing Status - RLE full weight-bearing   Presentation and Etiology   Pertinent history of current problem (include personal factors and/or comorbidities that impact the POC) states he fell down a hill and fractured his right ankle    Impairments A. Pain;D. Decreased ROM;F. Decreased strength and endurance;C. Swelling;E. Decreased flexibility;G. Impaired balance;H. Impaired gait   Functional Limitations perform activities of daily living;perform required work activities   Symptom Location right ankle   How/Where did it occur With a fall   Onset date of current episode/exacerbation 05/16/17   Chronicity New   Best (/10) 2   Worst (/10) 5   Pain quality C. Aching;B. Dull   Frequency of pain/symptoms A. Constant   Pain/symptoms are: Worse during the day   Pain/symptoms exacerbated by B. Walking   Pain/symptoms eased by C. Rest;H. Cold   Progression of symptoms since onset: Improved   Prior Level of Function   Prior Level of Function-Mobility prior was working full time   Current Level of Function   Patient role/employment history A. Employed   Employment Comments works at [employer]   Current equipment-Gait/Locomotion Standard cane   Fall Risk Screen   Fall screen completed by PT   Per patient - Fall 2 or more times in past year? No   Per patient - Fall with injury in past year? Yes   Is patient a fall risk? No   Ankle/Foot Objective Findings   Side (if bilateral, select both right and left) Right   Observation using cane in right side and was educated to  use left and no brace   Integumentary swollen right ankle and toes   Posture Normal   Gait/Locomotion cane wrong side and decreased heel strike and push off   Balance/ Proprioception (Single Leg Stance) not yet   Ankle/Foot Flexibility Comments rear foot locked, great toe limited 50%, talus quite tight   Palpation tender deltoid lig and lateral malleolus   Accessory Motion/Joint Mobility see above   Right DF (Knee Ext) AROM 0   Right PF AROM 25   Right Calcanceal Inversion AROM 0   Right Calcaneal Eversion AROM 0   Right DF/Inversion Strength +3/5   Right DF/Eversion Strength +3/5   Right PF/Inversion Strength 3/5   Right PF/Eversion Strength 3/5   Right PF Strength -3/5   Right Gastroc (in WB) Flexibility limited   Right Soleus (in WB) Flexibility very poor   Planned Therapy Interventions   Planned Therapy Interventions joint mobilization;manual therapy;neuromuscular re-education;ROM;strengthening;stretching;balance training   Planned Modality Interventions   Planned Modality Interventions Comments prn modalities   Clinical Impression   Criteria for Skilled Therapeutic Interventions Met yes, treatment indicated   PT Diagnosis right ankle weakness and decreased ROM   Influenced by the following impairments pain, functional weakness   Functional limitations due to impairments stairs, amb, work   Clinical Presentation Stable/Uncomplicated   Clinical Presentation Rationale clinical obs   Clinical Decision Making (Complexity) Low complexity   Therapy Frequency 2 times/Week   Predicted Duration of Therapy Intervention (days/wks) 8 weeks   Risk & Benefits of therapy have been explained Yes   Patient, Family & other staff in agreement with plan of care Yes   Clinical Impression Comments Presents with decreased ROM, strength, decreased balance and gait so he is unable to work or walk without an asisstance device.   Education Assessment   Preferred Learning Style Listening;Reading;Demonstration;Pictures/video   Barriers to  Learning No barriers   ORTHO GOALS   PT Ortho Eval Goals 1;2   Ortho Goal 1   Goal Identifier short   Goal Description Client will have increase in DF and PF by 10 degreees and have at least 5 degrees rear foot inversion to match left side.   Target Date 08/11/17   Ortho Goal 2   Goal Identifier short   Goal Description Client will walk on even surfaces woith no assistance device.   Target Date 07/28/17   Ortho Goal 3   Goal Identifier long   Goal Description Client will have +4/5 strength and functional balance for return to work.   Target Date 09/08/17   Total Evaluation Time   Total Evaluation Time 20

## 2017-07-12 ENCOUNTER — HOSPITAL ENCOUNTER (OUTPATIENT)
Dept: PHYSICAL THERAPY | Facility: HOSPITAL | Age: 62
Setting detail: THERAPIES SERIES
End: 2017-07-12
Attending: FAMILY MEDICINE
Payer: COMMERCIAL

## 2017-07-12 PROCEDURE — 40000718 ZZHC STATISTIC PT DEPARTMENT ORTHO VISIT

## 2017-07-12 PROCEDURE — 97124 MASSAGE THERAPY: CPT | Mod: GP

## 2017-07-18 ENCOUNTER — HOSPITAL ENCOUNTER (OUTPATIENT)
Dept: PHYSICAL THERAPY | Facility: HOSPITAL | Age: 62
Setting detail: THERAPIES SERIES
End: 2017-07-18
Attending: FAMILY MEDICINE
Payer: COMMERCIAL

## 2017-07-18 PROCEDURE — 40000718 ZZHC STATISTIC PT DEPARTMENT ORTHO VISIT

## 2017-07-18 PROCEDURE — 97110 THERAPEUTIC EXERCISES: CPT | Mod: GP

## 2017-07-20 ENCOUNTER — HOSPITAL ENCOUNTER (OUTPATIENT)
Dept: PHYSICAL THERAPY | Facility: HOSPITAL | Age: 62
Setting detail: THERAPIES SERIES
End: 2017-07-20
Attending: FAMILY MEDICINE
Payer: COMMERCIAL

## 2017-07-20 PROCEDURE — 40000718 ZZHC STATISTIC PT DEPARTMENT ORTHO VISIT

## 2017-07-20 PROCEDURE — 97110 THERAPEUTIC EXERCISES: CPT | Mod: GP

## 2017-07-24 ENCOUNTER — HOSPITAL ENCOUNTER (OUTPATIENT)
Dept: PHYSICAL THERAPY | Facility: HOSPITAL | Age: 62
Setting detail: THERAPIES SERIES
End: 2017-07-24
Attending: FAMILY MEDICINE
Payer: COMMERCIAL

## 2017-07-24 PROCEDURE — 40000718 ZZHC STATISTIC PT DEPARTMENT ORTHO VISIT

## 2017-07-24 PROCEDURE — 97110 THERAPEUTIC EXERCISES: CPT | Mod: GP

## 2017-07-26 ENCOUNTER — HOSPITAL ENCOUNTER (OUTPATIENT)
Dept: PHYSICAL THERAPY | Facility: HOSPITAL | Age: 62
Setting detail: THERAPIES SERIES
End: 2017-07-26
Attending: FAMILY MEDICINE
Payer: COMMERCIAL

## 2017-07-26 PROCEDURE — 40000718 ZZHC STATISTIC PT DEPARTMENT ORTHO VISIT

## 2017-07-26 PROCEDURE — 97110 THERAPEUTIC EXERCISES: CPT | Mod: GP

## 2017-08-02 NOTE — PROGRESS NOTES
Outpatient Physical Therapy Progress Note     Patient: Anthony Steele  : 1955    Beginning/End Dates of Reporting Period:  7/10/2017 to 2017    Referring Provider: Dr. Amato  Therapy Diagnosis: weakness with decreased ROM ankle     Client Self Report: states getting better but still has difficult time with stairs and fast walking. Squatting difficult.    Objective Measurements:      Pain, still at times with stiffness moderate level  ROM DF 5, PF 25,  INV 5, EV 9  Strength grossly DF -4/5, PF 3/5, INV +3/5, EV -4/5  Gait Functional slow to moderate walking even ground, needs to work on uneven ground and progressive dipping onto a bottom step                                      Outcome Measures (most recent score):      Goals:  Goal Identifier     Goal Description     Target Date     Date Met      Progress:     Goal Identifier     Goal Description     Target Date     Date Met      Progress:     Goal Identifier     Goal Description     Target Date     Date Met      Progress:     Goal Identifier     Goal Description     Target Date     Date Met      Progress:     Goal Identifier     Goal Description     Target Date     Date Met      Progress:     Goal Identifier     Goal Description     Target Date     Date Met      Progress:     Goal Identifier     Goal Description     Target Date     Date Met      Progress:     Goal Identifier     Goal Description     Target Date     Date Met      Progress:     Progress Toward Goals:   Progress this reporting period: Excellent but I'm not sure he is ready for work at this time and 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          Plan:  Continue therapy per current plan of care.    Discharge:    Reason for Discharge:    Equipment Issued:     Discharge Plan: Patient to continue home program.

## 2017-08-03 ENCOUNTER — TRANSFERRED RECORDS (OUTPATIENT)
Dept: HEALTH INFORMATION MANAGEMENT | Facility: HOSPITAL | Age: 62
End: 2017-08-03

## 2017-08-09 ENCOUNTER — HOSPITAL ENCOUNTER (OUTPATIENT)
Dept: PHYSICAL THERAPY | Facility: HOSPITAL | Age: 62
Setting detail: THERAPIES SERIES
End: 2017-08-09
Attending: FAMILY MEDICINE
Payer: COMMERCIAL

## 2017-08-09 PROCEDURE — 40000718 ZZHC STATISTIC PT DEPARTMENT ORTHO VISIT

## 2017-08-09 PROCEDURE — 97110 THERAPEUTIC EXERCISES: CPT | Mod: GP

## 2017-08-11 ENCOUNTER — HOSPITAL ENCOUNTER (OUTPATIENT)
Dept: PHYSICAL THERAPY | Facility: HOSPITAL | Age: 62
Setting detail: THERAPIES SERIES
End: 2017-08-11
Attending: FAMILY MEDICINE
Payer: COMMERCIAL

## 2017-08-11 PROCEDURE — 40000718 ZZHC STATISTIC PT DEPARTMENT ORTHO VISIT

## 2017-08-11 PROCEDURE — 97110 THERAPEUTIC EXERCISES: CPT | Mod: GP

## 2017-08-14 ENCOUNTER — HOSPITAL ENCOUNTER (OUTPATIENT)
Dept: PHYSICAL THERAPY | Facility: HOSPITAL | Age: 62
Setting detail: THERAPIES SERIES
End: 2017-08-14
Attending: FAMILY MEDICINE
Payer: COMMERCIAL

## 2017-08-14 PROCEDURE — 97110 THERAPEUTIC EXERCISES: CPT | Mod: GP

## 2017-08-14 PROCEDURE — 40000718 ZZHC STATISTIC PT DEPARTMENT ORTHO VISIT

## 2017-08-16 ENCOUNTER — HOSPITAL ENCOUNTER (OUTPATIENT)
Dept: PHYSICAL THERAPY | Facility: HOSPITAL | Age: 62
Setting detail: THERAPIES SERIES
End: 2017-08-16
Attending: FAMILY MEDICINE
Payer: COMMERCIAL

## 2017-08-16 PROCEDURE — 40000718 ZZHC STATISTIC PT DEPARTMENT ORTHO VISIT

## 2017-08-16 PROCEDURE — 97110 THERAPEUTIC EXERCISES: CPT | Mod: GP

## 2017-08-24 ENCOUNTER — HOSPITAL ENCOUNTER (OUTPATIENT)
Dept: PHYSICAL THERAPY | Facility: HOSPITAL | Age: 62
Setting detail: THERAPIES SERIES
End: 2017-08-24
Attending: FAMILY MEDICINE
Payer: COMMERCIAL

## 2017-08-24 PROCEDURE — 40000718 ZZHC STATISTIC PT DEPARTMENT ORTHO VISIT

## 2017-08-24 PROCEDURE — 97110 THERAPEUTIC EXERCISES: CPT | Mod: GP

## 2017-08-28 ENCOUNTER — HOSPITAL ENCOUNTER (OUTPATIENT)
Dept: PHYSICAL THERAPY | Facility: HOSPITAL | Age: 62
Setting detail: THERAPIES SERIES
End: 2017-08-28
Attending: FAMILY MEDICINE
Payer: COMMERCIAL

## 2017-08-28 PROCEDURE — 40000718 ZZHC STATISTIC PT DEPARTMENT ORTHO VISIT

## 2017-08-28 PROCEDURE — 97110 THERAPEUTIC EXERCISES: CPT | Mod: GP

## 2017-08-30 ENCOUNTER — HOSPITAL ENCOUNTER (OUTPATIENT)
Dept: PHYSICAL THERAPY | Facility: HOSPITAL | Age: 62
Setting detail: THERAPIES SERIES
End: 2017-08-30
Attending: FAMILY MEDICINE
Payer: COMMERCIAL

## 2017-08-30 PROCEDURE — 40000718 ZZHC STATISTIC PT DEPARTMENT ORTHO VISIT

## 2017-08-30 PROCEDURE — 97110 THERAPEUTIC EXERCISES: CPT | Mod: GP

## 2017-09-14 ENCOUNTER — TRANSFERRED RECORDS (OUTPATIENT)
Dept: HEALTH INFORMATION MANAGEMENT | Facility: HOSPITAL | Age: 62
End: 2017-09-14

## 2017-11-01 ENCOUNTER — OFFICE VISIT (OUTPATIENT)
Dept: FAMILY MEDICINE | Facility: OTHER | Age: 62
End: 2017-11-01
Attending: FAMILY MEDICINE
Payer: COMMERCIAL

## 2017-11-01 VITALS
DIASTOLIC BLOOD PRESSURE: 76 MMHG | SYSTOLIC BLOOD PRESSURE: 136 MMHG | HEART RATE: 76 BPM | BODY MASS INDEX: 30.61 KG/M2 | WEIGHT: 195 LBS | HEIGHT: 67 IN | TEMPERATURE: 97.6 F | OXYGEN SATURATION: 98 %

## 2017-11-01 DIAGNOSIS — K40.20 NON-RECURRENT BILATERAL INGUINAL HERNIA WITHOUT OBSTRUCTION OR GANGRENE: Primary | ICD-10-CM

## 2017-11-01 DIAGNOSIS — F43.21 ADJUSTMENT DISORDER WITH DEPRESSED MOOD: ICD-10-CM

## 2017-11-01 PROCEDURE — 99213 OFFICE O/P EST LOW 20 MIN: CPT | Performed by: FAMILY MEDICINE

## 2017-11-01 RX ORDER — BUPROPION HYDROCHLORIDE 300 MG/1
TABLET ORAL
Qty: 90 TABLET | Refills: 3 | Status: SHIPPED | OUTPATIENT
Start: 2017-11-01 | End: 2018-05-01

## 2017-11-01 ASSESSMENT — ANXIETY QUESTIONNAIRES
3. WORRYING TOO MUCH ABOUT DIFFERENT THINGS: SEVERAL DAYS
7. FEELING AFRAID AS IF SOMETHING AWFUL MIGHT HAPPEN: NOT AT ALL
IF YOU CHECKED OFF ANY PROBLEMS ON THIS QUESTIONNAIRE, HOW DIFFICULT HAVE THESE PROBLEMS MADE IT FOR YOU TO DO YOUR WORK, TAKE CARE OF THINGS AT HOME, OR GET ALONG WITH OTHER PEOPLE: NOT DIFFICULT AT ALL
1. FEELING NERVOUS, ANXIOUS, OR ON EDGE: SEVERAL DAYS
6. BECOMING EASILY ANNOYED OR IRRITABLE: SEVERAL DAYS
2. NOT BEING ABLE TO STOP OR CONTROL WORRYING: NOT AT ALL

## 2017-11-01 ASSESSMENT — PAIN SCALES - GENERAL: PAINLEVEL: NO PAIN (0)

## 2017-11-01 ASSESSMENT — PATIENT HEALTH QUESTIONNAIRE - PHQ9: SUM OF ALL RESPONSES TO PHQ QUESTIONS 1-9: 5

## 2017-11-01 NOTE — PROGRESS NOTES
"  SUBJECTIVE:   Anthony Steele is a 62 year old male who presents to clinic today for the following health issues:      Depression and Anxiety Follow-Up    Status since last visit: Improved     Other associated symptoms:None    Complicating factors:     Significant life event: Yes-  But nothing major. Daughter lost baby and is in hospital. Mother had passed away previously.    Current substance abuse: Alcohol    PHQ-9 Score and MyChart F/U Questions 4/10/2017 5/8/2017 11/1/2017   Total Score 5 4 5   Q9: Suicide Ideation Not at all Not at all Not at all     MACEY-7 SCORE 3/6/2017 4/10/2017 5/8/2017   Total Score 6 8 4     Doing real well - wants to safty on med  Wife happy   PHQ-9  English  PHQ-9   Any Language  GAD7  Suicide Assessment Five-step Evaluation and Treatment (SAFE-T)      Amount of exercise or physical activity: 6-7 days/week for an average of greater than 60 minutes    Problems taking medications regularly: No    Medication side effects: none, takes tylenol pm to sleep though cause he seems restless.    Diet: regular (no restrictions)            Problem list and histories reviewed & adjusted, as indicated.  Additional history: as documented    Labs reviewed in EPIC    Reviewed and updated as needed this visit by clinical staffTobacco  Allergies  Meds  Med Hx  Surg Hx  Fam Hx  Soc Hx      Reviewed and updated as needed this visit by Provider         ROS:  C: NEGATIVE for fever, chills, change in weight    OBJECTIVE:                                                    /76 (BP Location: Left arm, Patient Position: Chair, Cuff Size: Adult Large)  Pulse 76  Temp 97.6  F (36.4  C) (Tympanic)  Ht 5' 7\" (1.702 m)  Wt 195 lb (88.5 kg)  SpO2 98%  BMI 30.54 kg/m2  Body mass index is 30.54 kg/(m^2).   GENERAL: healthy, alert, well nourished, well hydrated, no distress  PSYCH: Alert and oriented times 3; speech- coherent , normal rate and volume; able to articulate logical thoughts, able to abstract " reason, no tangential thoughts, no hallucinations or delusions, affect- normal         ASSESSMENT/PLAN:                                                      (K40.20) Non-recurrent bilateral inguinal hernia without obstruction or gangrene  (primary encounter diagnosis)  Comment: canceled surgery due to accident and ankle fx  Plan: Pt will call to reschedule in future    (F43.21) Adjustment disorder with depressed mood  Comment: doing well. Wants to stay on for now same dose and med. Discussed R/B of stopping   Plan: buPROPion (WELLBUTRIN XL) 300 MG 24 hr tablet        Continue current medications and behavioral changes.   F/u in 6-9 months at Px.          Abdulkadir Huizar MD  [facility]

## 2017-11-01 NOTE — MR AVS SNAPSHOT
"              After Visit Summary   2017    Anthony Steele    MRN: [id number]           Patient Information     Date Of Birth          1955        Visit Information        Provider Department      2017 8:00 AM Abdulkadir Huizar MD [facility]        Today's Diagnoses     Non-recurrent bilateral inguinal hernia without obstruction or gangrene    -  1    Adjustment disorder with depressed mood           Follow-ups after your visit        Who to contact     If you have questions or need follow up information about today's clinic visit or your schedule please contact [facility] directly at [phone].  Normal or non-critical lab and imaging results will be communicated to you by [employer], letter or phone within 4 business days after the clinic has received the results. If you do not hear from us within 7 days, please contact the clinic through [employer]art or phone. If you have a critical or abnormal lab result, we will notify you by phone as soon as possible.  Submit refill requests through [employer] or call your pharmacy and they will forward the refill request to us. Please allow 3 business days for your refill to be completed.          Additional Information About Your Visit        MyChart Information     [employer] lets you send messages to your doctor, view your test results, renew your prescriptions, schedule appointments and more. To sign up, go to [email or web address] . Click on \"Log in\" on the left side of the screen, which will take you to the Welcome page. Then click on \"Sign up Now\" on the right side of the page.     You will be asked to enter the access code listed below, as well as some personal information. Please follow the directions to create your username and password.     Your access code is: [email or web address]  Expires: 2018  9:01 AM     Your access code will  in 90 days. If you need help or a new code, please call your [facility] or [phone].      " "  Care EveryWhere ID     This is your Care EveryWhere ID. This could be used by other organizations to access your [facility] medical records  [id number]        Your Vitals Were     Pulse Temperature Height Pulse Oximetry BMI (Body Mass Index)       76 97.6  F (36.4  C) (Tympanic) 5' 7\" (1.702 m) 98% 30.54 kg/m2        Blood Pressure from Last 3 Encounters:   11/01/17 136/76   05/16/17 153/95   05/16/17 108/66    Weight from Last 3 Encounters:   11/01/17 195 lb (88.5 kg)   05/16/17 190 lb (86.2 kg)   05/08/17 205 lb (93 kg)              Today, you had the following     No orders found for display         Where to get your medicines      These medications were sent to [id number] - [place], [place] - [address]  [address] [place] [address]     Phone:  [phone]     buPROPion 300 MG 24 hr tablet          Primary Care Provider Office Phone # Fax #    Abdulkadir Huizar [phone]       [facility] [address]  [place] [address]        Equal Access to Services     [facility] Center[facility] AH: Hadii aad ku hadasho Soomaali, waaxda luqadaha, qaybta kaalmada adeegyada, devendra melissan pastora mustafa ah. So [facility] [phone].    ATENCIÓN: Si habla español, tiene a arita disposición servicios gratuitos de asistencia lingüística. [facility] [phone].    We comply with applicable federal civil rights laws and [place] laws. We do not discriminate on the basis of race, color, national origin, age, disability, sex, sexual orientation, or gender identity.            Thank you!     Thank you for choosing [facility]  for your care. Our goal is always to provide you with excellent care. Hearing back from our patients is one way we can continue to improve our services. Please take a few minutes to complete the written survey that you may receive in the mail after your visit with us. Thank you!             Your Updated Medication List - Protect others around you: Learn " how to safely use, store and throw away your medicines at www.disposemymeds.org.          This list is accurate as of: 11/1/17  9:01 AM.  Always use your most recent med list.                   Brand Name Dispense Instructions for use Diagnosis    aspirin 325 MG tablet     180 tablet    Take 1 tablet (325 mg) by mouth daily    Coronary artery disease involving native coronary artery of native heart without angina pectoris       atorvastatin 40 MG tablet    LIPITOR    90 tablet    TAKE 1 TABLET DAILY BY MOUT H - GENERIC FOR LIPITOR    Mixed hyperlipidemia       buPROPion 300 MG 24 hr tablet    WELLBUTRIN XL    90 tablet    1 tabs orally  daily    Adjustment disorder with depressed mood       HYDROcodone-acetaminophen 5-325 MG per tablet    NORCO    6 tablet    Take 1-2 tablets by mouth every 8 hours as needed for moderate to severe pain        lisinopril 10 MG tablet    PRINIVIL/ZESTRIL    90 tablet    TAKE 1 TABLET DAILY BY MOUT H - GENERIC FOR ZESTRIL    Mixed hyperlipidemia       metoprolol 25 MG tablet    LOPRESSOR    180 tablet    TAKE 1 TABLET BY MOUTH TWIC E A DAY        nitroGLYcerin 0.4 MG sublingual tablet    NITROSTAT    25 tablet    PLACE 1 TABLET UNDER THE TONGUE EVERY FIVE MINUTES AS NEEDED FOR CHEST PAIN. DO NOT CRUSH; MAXIMUM OF 3 DOSES IN 15 MINUTES.    Coronary artery disease involving native coronary artery of native heart without angina pectoris       OMEGA-3 FISH OIL PO      Take 1 g by mouth        omeprazole 40 MG capsule    priLOSEC    90 capsule    TAKE 1 CAPSULE BY MOUTH ONCE DAILY. TAKE 30 TO 60 MINUTES BEFORE A MEAL.    Gastroesophageal reflux disease without esophagitis

## 2017-11-01 NOTE — NURSING NOTE
"Chief Complaint   Patient presents with     Depression     Anxiety       Initial /76 (BP Location: Left arm, Patient Position: Chair, Cuff Size: Adult Large)  Pulse 76  Temp 97.6  F (36.4  C) (Tympanic)  Ht 5' 7\" (1.702 m)  Wt 195 lb (88.5 kg)  SpO2 98%  BMI 30.54 kg/m2 Estimated body mass index is 30.54 kg/(m^2) as calculated from the following:    Height as of this encounter: 5' 7\" (1.702 m).    Weight as of this encounter: 195 lb (88.5 kg).  Medication Reconciliation: complete     Tahmina Herrmann     "

## 2018-02-11 NOTE — PROGRESS NOTES
Outpatient Physical Therapy Discharge Note     Patient: Anthony Steele  : 1955    Beginning/End Dates of Reporting Period:  7/10/2017 to 2018    Referring Provider: Dr. Amato  Therapy Diagnosis: weakness with gait impairment     Client Self Report: states as of last session he twisted ankle coming off a ladder but pain was not too bad, telling this to PTA    Objective Measurements:                                    Client went to MD after this appointment in August and was cleared to return to work in September. I did not see him for final appointment and have no data.      Outcome Measures (most recent score):      Goals:  Goal Identifier short   Goal Description Client will have increase in DF and PF by 10 degreees and have at least 5 degrees rear foot inversion to match left side.   Target Date 17   Date Met      Progress:     Goal Identifier short   Goal Description Client will walk on even surfaces woith no assistance device.   Target Date 17   Date Met      Progress:     Goal Identifier long   Goal Description Client will have +4/5 strength and functional balance for return to work.   Target Date 17   Date Met      Progress:     Goal Identifier     Goal Description     Target Date     Date Met      Progress:     Goal Identifier     Goal Description     Target Date     Date Met      Progress:     Goal Identifier     Goal Description     Target Date     Date Met      Progress:     Goal Identifier     Goal Description     Target Date     Date Met      Progress:     Goal Identifier     Goal Description     Target Date     Date Met      Progress:     Progress Toward Goals:   Progress this reporting period:Was doing well until he twisted ankle coming off a ladder, I did not see him after this. He was cleared to go back to work by his ortho.          Plan:  Discharge from therapy.    Discharge:    Reason for Discharge: Patient chooses to discontinue therapy.    Equipment Issued:    Discharge Plan: Patient to continue home program.

## 2018-02-19 DIAGNOSIS — E78.2 MIXED HYPERLIPIDEMIA: ICD-10-CM

## 2018-02-19 RX ORDER — LISINOPRIL 10 MG/1
TABLET ORAL
Qty: 90 TABLET | Refills: 0 | Status: SHIPPED | OUTPATIENT
Start: 2018-02-19 | End: 2018-05-01

## 2018-03-12 DIAGNOSIS — K21.9 GASTROESOPHAGEAL REFLUX DISEASE WITHOUT ESOPHAGITIS: ICD-10-CM

## 2018-03-12 DIAGNOSIS — I10 ESSENTIAL HYPERTENSION WITH GOAL BLOOD PRESSURE LESS THAN 140/90: Primary | ICD-10-CM

## 2018-03-12 DIAGNOSIS — E78.2 MIXED HYPERLIPIDEMIA: ICD-10-CM

## 2018-03-12 RX ORDER — OMEPRAZOLE 40 MG/1
CAPSULE, DELAYED RELEASE ORAL
Qty: 90 CAPSULE | Refills: 1 | Status: SHIPPED | OUTPATIENT
Start: 2018-03-12 | End: 2018-10-08

## 2018-03-12 RX ORDER — ATORVASTATIN CALCIUM 40 MG/1
TABLET, FILM COATED ORAL
Qty: 90 TABLET | Refills: 0 | Status: SHIPPED | OUTPATIENT
Start: 2018-03-12 | End: 2018-04-20

## 2018-03-12 RX ORDER — METOPROLOL TARTRATE 25 MG/1
TABLET, FILM COATED ORAL
Qty: 180 TABLET | Refills: 1 | Status: SHIPPED | OUTPATIENT
Start: 2018-03-12 | End: 2018-10-13

## 2018-04-20 DIAGNOSIS — E78.2 MIXED HYPERLIPIDEMIA: ICD-10-CM

## 2018-04-20 NOTE — TELEPHONE ENCOUNTER
lipitor      Last Written Prescription Date:  3/12/18  Last Fill Quantity: 90,   # refills: 0  Last Office Visit: 11/1/17  Future Office visit:  none

## 2018-04-20 NOTE — LETTER
[facility] [place]  [address] [place] [address]  Phone: [phone]    April 23, 2018           Anthony Steele  [address]  [facility] [address]            Dear Anthony:    We are concerned about your health care.  We recently provided you with medication refills.  Lab tests are required every 12 months in order to continue refilling your Lipitor.  Orders have been placed in our computer and should be accessible at most [facility] labs. Please complete this lab work as soon as possible.        Thank You,      MD Ramandeep

## 2018-04-23 RX ORDER — ATORVASTATIN CALCIUM 40 MG/1
TABLET, FILM COATED ORAL
Qty: 30 TABLET | Refills: 0 | Status: SHIPPED | OUTPATIENT
Start: 2018-04-23 | End: 2018-06-17

## 2018-04-26 ENCOUNTER — HOSPITAL ENCOUNTER (EMERGENCY)
Facility: HOSPITAL | Age: 63
Discharge: HOME | End: 2018-04-26
Attending: PHYSICIAN ASSISTANT | Admitting: PHYSICIAN ASSISTANT
Payer: COMMERCIAL

## 2018-04-26 ENCOUNTER — TELEPHONE (OUTPATIENT)
Dept: FAMILY MEDICINE | Facility: OTHER | Age: 63
End: 2018-04-26

## 2018-04-26 VITALS
DIASTOLIC BLOOD PRESSURE: 81 MMHG | OXYGEN SATURATION: 98 % | TEMPERATURE: 97.9 F | WEIGHT: 190 LBS | RESPIRATION RATE: 18 BRPM | HEART RATE: 67 BPM | SYSTOLIC BLOOD PRESSURE: 133 MMHG | BODY MASS INDEX: 29.76 KG/M2

## 2018-04-26 DIAGNOSIS — L03.113 CELLULITIS OF HAND, RIGHT: ICD-10-CM

## 2018-04-26 DIAGNOSIS — Z13.9 SCREENING FOR CONDITION: ICD-10-CM

## 2018-04-26 LAB
ALBUMIN SERPL-MCNC: 3.8 G/DL (ref 3.4–5)
ALP SERPL-CCNC: 111 U/L (ref 40–150)
ALT SERPL W P-5'-P-CCNC: 28 U/L (ref 0–70)
ANION GAP SERPL CALCULATED.3IONS-SCNC: 8 MMOL/L (ref 3–14)
AST SERPL W P-5'-P-CCNC: 27 U/L (ref 0–45)
BASOPHILS # BLD AUTO: 0 10E9/L (ref 0–0.2)
BASOPHILS NFR BLD AUTO: 0.3 %
BILIRUB SERPL-MCNC: 0.4 MG/DL (ref 0.2–1.3)
BUN SERPL-MCNC: 18 MG/DL (ref 7–30)
CALCIUM SERPL-MCNC: 9.3 MG/DL (ref 8.5–10.1)
CHLORIDE SERPL-SCNC: 105 MMOL/L (ref 94–109)
CO2 SERPL-SCNC: 26 MMOL/L (ref 20–32)
CREAT SERPL-MCNC: 0.88 MG/DL (ref 0.66–1.25)
DIFFERENTIAL METHOD BLD: ABNORMAL
EOSINOPHIL # BLD AUTO: 0.1 10E9/L (ref 0–0.7)
EOSINOPHIL NFR BLD AUTO: 1.1 %
ERYTHROCYTE [DISTWIDTH] IN BLOOD BY AUTOMATED COUNT: 13 % (ref 10–15)
GFR SERPL CREATININE-BSD FRML MDRD: 88 ML/MIN/1.7M2
GLUCOSE SERPL-MCNC: 96 MG/DL (ref 70–99)
HCT VFR BLD AUTO: 39 % (ref 40–53)
HGB BLD-MCNC: 13.5 G/DL (ref 13.3–17.7)
IMM GRANULOCYTES # BLD: 0 10E9/L (ref 0–0.4)
IMM GRANULOCYTES NFR BLD: 0.2 %
LYMPHOCYTES # BLD AUTO: 1.6 10E9/L (ref 0.8–5.3)
LYMPHOCYTES NFR BLD AUTO: 24.9 %
MCH RBC QN AUTO: 31.7 PG (ref 26.5–33)
MCHC RBC AUTO-ENTMCNC: 34.6 G/DL (ref 31.5–36.5)
MCV RBC AUTO: 92 FL (ref 78–100)
MONOCYTES # BLD AUTO: 0.7 10E9/L (ref 0–1.3)
MONOCYTES NFR BLD AUTO: 10.5 %
NEUTROPHILS # BLD AUTO: 4.1 10E9/L (ref 1.6–8.3)
NEUTROPHILS NFR BLD AUTO: 63 %
NRBC # BLD AUTO: 0 10*3/UL
NRBC BLD AUTO-RTO: 0 /100
PLATELET # BLD AUTO: 193 10E9/L (ref 150–450)
POTASSIUM SERPL-SCNC: 4.4 MMOL/L (ref 3.4–5.3)
PROT SERPL-MCNC: 7.6 G/DL (ref 6.8–8.8)
RBC # BLD AUTO: 4.26 10E12/L (ref 4.4–5.9)
SODIUM SERPL-SCNC: 139 MMOL/L (ref 133–144)
URATE SERPL-MCNC: 4.2 MG/DL (ref 3.5–7.2)
WBC # BLD AUTO: 6.5 10E9/L (ref 4–11)

## 2018-04-26 PROCEDURE — 25000128 H RX IP 250 OP 636: Performed by: PHYSICIAN ASSISTANT

## 2018-04-26 PROCEDURE — 84550 ASSAY OF BLOOD/URIC ACID: CPT | Performed by: PHYSICIAN ASSISTANT

## 2018-04-26 PROCEDURE — 90715 TDAP VACCINE 7 YRS/> IM: CPT | Performed by: PHYSICIAN ASSISTANT

## 2018-04-26 PROCEDURE — 99213 OFFICE O/P EST LOW 20 MIN: CPT | Performed by: PHYSICIAN ASSISTANT

## 2018-04-26 PROCEDURE — 80053 COMPREHEN METABOLIC PANEL: CPT | Performed by: PHYSICIAN ASSISTANT

## 2018-04-26 PROCEDURE — 90471 IMMUNIZATION ADMIN: CPT

## 2018-04-26 PROCEDURE — G0463 HOSPITAL OUTPT CLINIC VISIT: HCPCS

## 2018-04-26 PROCEDURE — 85025 COMPLETE CBC W/AUTO DIFF WBC: CPT | Performed by: PHYSICIAN ASSISTANT

## 2018-04-26 PROCEDURE — G0463 HOSPITAL OUTPT CLINIC VISIT: HCPCS | Mod: 25

## 2018-04-26 PROCEDURE — 36415 COLL VENOUS BLD VENIPUNCTURE: CPT | Performed by: PHYSICIAN ASSISTANT

## 2018-04-26 RX ORDER — PREDNISONE 20 MG/1
TABLET ORAL
Qty: 10 TABLET | Refills: 0 | Status: SHIPPED | OUTPATIENT
Start: 2018-04-26 | End: 2018-05-01

## 2018-04-26 RX ADMIN — CLOSTRIDIUM TETANI TOXOID ANTIGEN (FORMALDEHYDE INACTIVATED), CORYNEBACTERIUM DIPHTHERIAE TOXOID ANTIGEN (FORMALDEHYDE INACTIVATED), BORDETELLA PERTUSSIS TOXOID ANTIGEN (GLUTARALDEHYDE INACTIVATED), BORDETELLA PERTUSSIS FILAMENTOUS HEMAGGLUTININ ANTIGEN (FORMALDEHYDE INACTIVATED), BORDETELLA PERTUSSIS PERTACTIN ANTIGEN, AND BORDETELLA PERTUSSIS FIMBRIAE 2/3 ANTIGEN 0.5 ML: 5; 2; 2.5; 5; 3; 5 INJECTION, SUSPENSION INTRAMUSCULAR at 16:32

## 2018-04-26 ASSESSMENT — ENCOUNTER SYMPTOMS
FEVER: 0
PSYCHIATRIC NEGATIVE: 1
FATIGUE: 0
NEUROLOGICAL NEGATIVE: 1
CARDIOVASCULAR NEGATIVE: 1
WOUND: 1

## 2018-04-26 NOTE — ED AVS SNAPSHOT
[facility]    [address] [address]    Phone:  [phone]                                       Anthony Steele   MRN: [id number]    Department:  [facility]   Date of Visit:  4/26/2018           After Visit Summary Signature Page     I have received my discharge instructions, and my questions have been answered. I have discussed any challenges I see with this plan with the nurse or doctor.    ..........................................................................................................................................  Patient/Patient Representative Signature      ..........................................................................................................................................  Patient Representative Print Name and Relationship to Patient    ..................................................               ................................................  Date                                            Time    ..........................................................................................................................................  Reviewed by Signature/Title    ...................................................              ..............................................  Date                                                            Time

## 2018-04-26 NOTE — ED NOTES
Patient presents with Rt hand swollen and red  X 2 days.  The middle finger on that hand is tender to the touch.  NKI.

## 2018-04-26 NOTE — ED PROVIDER NOTES
"  History     Chief Complaint   Patient presents with     hand swelling     \"right arm and right hand swelling since yesterday\"     The history is provided by the patient. No  was used.     Anthony Steele is a 62 year old male who has developed right hand pain/swelling since last night. Does not recall any direct known trauma or wound.  Worse swelling to the 3rd finger.  No fever/fatigue. No n/v/d. No decrease in energy/appetite. Admits he does not hydrate well    Problem List:    Patient Active Problem List    Diagnosis Date Noted     Non-recurrent bilateral inguinal hernia without obstruction or gangrene 11/01/2017     Priority: Medium     Gastroesophageal reflux disease without esophagitis 03/06/2017     Priority: Medium     Nocturia 03/06/2017     Priority: Medium     Adjustment disorder with depressed mood 03/06/2017     Priority: Medium     Tobacco abuse, in remission      Priority: Medium     Coronary artery disease involving native coronary artery without angina pectoris 07/24/2015     Priority: Medium     Essential hypertension 07/24/2015     Priority: Medium     Colon polyps      Priority: Medium     Prediabetes      Priority: Medium     H/O ETOH abuse      Priority: Medium     Displacement of cervical intervertebral disc without myelopathy(aka HERNIATED DISK)      Priority: Medium     Advanced care planning/counseling discussion 03/16/2012     Priority: Medium     Personal history of tobacco use, presenting hazards to health 09/06/2011     Priority: Medium     Tobacco abuse, in remission       Mixed hyperlipidemia 08/03/2011     Priority: Medium        Past Medical History:    Past Medical History:   Diagnosis Date     Acute myocardial infarction, unspecified site, episode of care unspecified 08/03/2011     Adjustment disorder with mixed anxiety and depressed mood 05/23/2012     CAD (coronary artery disease) 3/19/2013     Colon polyps      Coronary atherosclerosis of unspecified type " of vessel, native or graft 01/06/2012     Displacement of cervical intervertebral disc without myelopathy      GERD (gastroesophageal reflux disease)      H/O ETOH abuse      HTN (hypertension)      Mixed hyperlipidemia 08/03/2011     Personal history of tobacco use, presenting hazards to health 09/06/2011     Postsurgical percutaneous transluminal coronary angioplasty status 08/03/2011     Prediabetes      Status post coronary angiogram 3/19/2013     Tobacco abuse, in remission        Past Surgical History:    Past Surgical History:   Procedure Laterality Date     ANGIOPLASTY  [id number]    Failed angioplasty of OM vessel     ARTHROSCOPY KNEE      RT     BACK SURGERY      C5-6 fusion     Carpal Tunnel Syndrome  2003    Resolved from Problem List  2012     COLONOSCOPY  2008    colonoscopy with polypectomy/ Repeat in 2011     COLONOSCOPY  3/24/2014    Repeat in 2019//Procedure: COLONOSCOPY;  COLONOSCOPY;  Surgeon: Jessica Washington MD;  Location: [facility] OR     RELEASE CARPAL TUNNEL      RT     STENT  07/2011    Myocardial Infarction; Stent placement [facility]     STENT, CORONARY, LIZZIE  2012       Family History:    Family History   Problem Relation Age of Onset     CEREBROVASCULAR DISEASE Father 48     Cause of death     DIABETES Mother      Type 2     HEART DISEASE Mother      Heart valve replacements     Hypertension Mother      Arthritis Sister        Social History:  Marital Status:   [2]  Social History   Substance Use Topics     Smoking status: Former Smoker     Packs/day: 1.00     Years: 20.00     Smokeless tobacco: Never Used      Comment: Tried to quit: yes; longest period tobacco-free- 10 years     Alcohol use Yes      Comment: Occasionally        Medications:      amoxicillin-clavulanate (AUGMENTIN) 875-125 MG per tablet   predniSONE (DELTASONE) 20 MG tablet   aspirin 325 MG tablet   atorvastatin (LIPITOR) 40 MG tablet   buPROPion (WELLBUTRIN XL) 300 MG 24 hr tablet   HYDROcodone-acetaminophen (NORCO)  5-325 MG per tablet   lisinopril (PRINIVIL/ZESTRIL) 10 MG tablet   metoprolol tartrate (LOPRESSOR) 25 MG tablet   nitroglycerin (NITROSTAT) 0.4 MG sublingual tablet   Omega-3 Fatty Acids (OMEGA-3 FISH OIL PO)   omeprazole (PRILOSEC) 40 MG capsule         Review of Systems   Constitutional: Negative for fatigue and fever.   Cardiovascular: Negative.    Skin: Positive for wound.   Neurological: Negative.    Psychiatric/Behavioral: Negative.        Physical Exam   BP: 133/81  Pulse: 67  Temp: 97.9  F (36.6  C)  Resp: 18  Weight: 86.2 kg (190 lb)  SpO2: 98 %      Physical Exam   Constitutional: He is oriented to person, place, and time. He appears well-developed and well-nourished. No distress.   Cardiovascular: Normal rate.    Pulmonary/Chest: Effort normal.   Musculoskeletal:   Right hand:  Diffuse moderate edema/erythema, worst on the 3rd finger, +AFROM w/ diffuse pain, m/n/v intact   Neurological: He is alert and oriented to person, place, and time.   Skin: He is not diaphoretic.   Psychiatric: He has a normal mood and affect.   Nursing note and vitals reviewed.      ED Course     ED Course     Procedures            Results for orders placed or performed during the hospital encounter of 04/26/18 (from the past 24 hour(s))   CBC with platelets differential   Result Value Ref Range    WBC 6.5 4.0 - 11.0 10e9/L    RBC Count 4.26 (L) 4.4 - 5.9 10e12/L    Hemoglobin 13.5 13.3 - 17.7 g/dL    Hematocrit 39.0 (L) 40.0 - 53.0 %    MCV 92 78 - 100 fl    MCH 31.7 26.5 - 33.0 pg    MCHC 34.6 31.5 - 36.5 g/dL    RDW 13.0 10.0 - 15.0 %    Platelet Count 193 150 - 450 10e9/L    Diff Method Automated Method     % Neutrophils 63.0 %    % Lymphocytes 24.9 %    % Monocytes 10.5 %    % Eosinophils 1.1 %    % Basophils 0.3 %    % Immature Granulocytes 0.2 %    Nucleated RBCs 0 0 /100    Absolute Neutrophil 4.1 1.6 - 8.3 10e9/L    Absolute Lymphocytes 1.6 0.8 - 5.3 10e9/L    Absolute Monocytes 0.7 0.0 - 1.3 10e9/L    Absolute Eosinophils  0.1 0.0 - 0.7 10e9/L    Absolute Basophils 0.0 0.0 - 0.2 10e9/L    Abs Immature Granulocytes 0.0 0 - 0.4 10e9/L    Absolute Nucleated RBC 0.0    Comprehensive metabolic panel   Result Value Ref Range    Sodium 139 133 - 144 mmol/L    Potassium 4.4 3.4 - 5.3 mmol/L    Chloride 105 94 - 109 mmol/L    Carbon Dioxide 26 20 - 32 mmol/L    Anion Gap 8 3 - 14 mmol/L    Glucose 96 70 - 99 mg/dL    Urea Nitrogen 18 7 - 30 mg/dL    Creatinine 0.88 0.66 - 1.25 mg/dL    GFR Estimate 88 >60 mL/min/1.7m2    GFR Estimate If Black >90 >60 mL/min/1.7m2    Calcium 9.3 8.5 - 10.1 mg/dL    Bilirubin Total 0.4 0.2 - 1.3 mg/dL    Albumin 3.8 3.4 - 5.0 g/dL    Protein Total 7.6 6.8 - 8.8 g/dL    Alkaline Phosphatase 111 40 - 150 U/L    ALT 28 0 - 70 U/L    AST 27 0 - 45 U/L   Uric acid   Result Value Ref Range    Uric Acid 4.2 3.5 - 7.2 mg/dL       Medications   Tdap (tetanus-diphtheria-acell pertussis) (ADACEL) injection 0.5 mL (0.5 mLs Intramuscular Given 4/26/18 1632)   pt tolerated well    I discussed this pt with Dr. Singh. He agrees no further testing needed at this time. He agrees with my tx plan. Thank you for your time.    Assessments & Plan (with Medical Decision Making)     I have reviewed the nursing notes.    I have reviewed the findings, diagnosis, plan and need for follow up with the patient.      Discharge Medication List as of 4/26/2018  4:50 PM      START taking these medications    Details   amoxicillin-clavulanate (AUGMENTIN) 875-125 MG per tablet Take 1 tablet by mouth 2 times daily, Disp-20 tablet, R-0, E-Prescribe      predniSONE (DELTASONE) 20 MG tablet Take two tablets (= 40mg) each day for 5 (five) days, Disp-10 tablet, R-0, E-Prescribe             Final diagnoses:   Cellulitis of hand, right   Screening for condition - Uric acid normal       Increase fluids and eat cherries  Patient verbally educated and given appropriate education sheets for the diagnoses and has no questions.  Take medications as directed.    Follow up with your Primary Care provider if symptoms increase or if concerns develop, return to the ER  Megan Sanchez Certified  Physician Assistant  4/26/2018  8:56 PM  URGENT CARE CLINIC      4/26/2018   [facility]     Megan Sanchez PA  04/26/18 [id number]

## 2018-04-26 NOTE — TELEPHONE ENCOUNTER
12:57 PM    Reason for Call: OVERBOOK    Patient is having the following symptoms: RT arm/hand swollen/painful for 1 days.    The patient is requesting an appointment for Friday with Dr Huizar.    Was an appointment offered for this call? Yes  If yes : Appointment type short              Date 04/30/18    Preferred method for responding to this message: Telephone Call  What is your phone number ?  [phone]    If we cannot reach you directly, may we leave a detailed response at the number you provided? Yes    Can this message wait until your PCP/provider returns, if unavailable today? Not applicable    Cece Espinoza

## 2018-04-26 NOTE — ED AVS SNAPSHOT
[facility]    [address] [address]    Phone:  [phone]                                       Anthony Steele   MRN: [id number]    Department:  [facility]   Date of Visit:  4/26/2018           Patient Information     Date Of Birth          1955        Your diagnoses for this visit were:     Cellulitis of hand, right     Screening for condition Uric acid normal       You were seen by Megan Sanchez PA.      Follow-up Information     Follow up with Abdulkadir Huizar MD In 2 days.    Specialty:  Family Practice    Why:  If your symptoms are not resolving, sooner, If symptoms worsen    Contact information:    [address] [facility] [address] [phone]          Follow up with [facility].    Specialty:  EMERGENCY MEDICINE    Why:  If fever/further concerns develop    Contact information:    [address] [address] [phone]    Additional information:    From [facility]: Take [address]. Turn left at [address]. Turn left at the first stoplight on [address]. At the first stop sign, take a right onto [address]. Take a left into the parking lot and continue through until you reach the North enterance of the building.       From [place]: Take [address]. Take the [address] ramp towards [place]. Turn left onto [address]. Take a slight right onto [address]. Turn left at the first stoplight on [address]. At the first stop sign, take a right onto [address]. Take a left into the parking lot and continue through until you reach the North enterance of the building.       From [place]: Take [address]. Take a right at [address]. At the first stop sign, take a right onto [address]. Take a left into the parking lot and continue through until you reach the North enterance of the building.       Discharge References/Attachments      CELLULITIS, DISCHARGE INSTRUCTIONS FOR (ENGLISH)         Review of your medicines      START taking        Dose / Directions Last dose taken    amoxicillin-clavulanate 875-125 MG per tablet   Commonly known as:  AUGMENTIN   Dose:  1 tablet   Quantity:  20 tablet        Take 1 tablet by mouth 2 times daily   Refills:  0        predniSONE 20 MG tablet   Commonly known as:  DELTASONE   Quantity:  10 tablet        Take two tablets (= 40mg) each day for 5 (five) days   Refills:  0          Our records show that you are taking the medicines listed below. If these are incorrect, please call your family doctor or clinic.        Dose / Directions Last dose taken    aspirin 325 MG tablet   Dose:  325 mg   Quantity:  180 tablet        Take 1 tablet (325 mg) by mouth daily   Refills:  0        atorvastatin 40 MG tablet   Commonly known as:  LIPITOR   Quantity:  30 tablet        TAKE 1 TABLET DAILY BY MOUTH - GENERIC FOR LIPITOR   Refills:  0        buPROPion 300 MG 24 hr tablet   Commonly known as:  WELLBUTRIN XL   Quantity:  90 tablet        1 tabs orally  daily   Refills:  3        HYDROcodone-acetaminophen 5-325 MG per tablet   Commonly known as:  NORCO   Dose:  1-2 tablet   Quantity:  6 tablet        Take 1-2 tablets by mouth every 8 hours as needed for moderate to severe pain   Refills:  0        lisinopril 10 MG tablet   Commonly known as:  PRINIVIL/ZESTRIL   Quantity:  90 tablet        TAKE 1 TABLET BY MOUTH DAILY- GENERIC FOR ZESTRIL   Refills:  0        metoprolol tartrate 25 MG tablet   Commonly known as:  LOPRESSOR   Quantity:  180 tablet        TAKE 1 TABLET BY MOUTH TWICE DAILY   Refills:  1        nitroGLYcerin 0.4 MG sublingual tablet   Commonly known as:  NITROSTAT   Quantity:  25 tablet        PLACE 1 TABLET UNDER THE TONGUE EVERY FIVE MINUTES AS NEEDED FOR CHEST PAIN. DO NOT CRUSH; MAXIMUM OF 3 DOSES IN 15 MINUTES.   Refills:  5        OMEGA-3 FISH OIL PO   Dose:  1 g        Take 1 g by mouth   Refills:  0      "   omeprazole 40 MG capsule   Commonly known as:  priLOSEC   Quantity:  90 capsule        TAKE 1 CAPSULE BY MOUTH ONCE DAILY. TAKE 30 TO 60 MINUTES BEFORE A MEAL.   Refills:  1                Prescriptions were sent or printed at these locations (2 Prescriptions)                   [id number] - [place] - [address]   [address] [place] [address]    Telephone:  [phone]   Fax:  [phone]   Hours:                  E-Prescribed (2 of 2)         amoxicillin-clavulanate (AUGMENTIN) 875-125 MG per tablet               predniSONE (DELTASONE) 20 MG tablet                Procedures and tests performed during your visit     CBC with platelets differential    Comprehensive metabolic panel    Uric acid      Orders Needing Specimen Collection     None      Pending Results     No orders found from 2018 to 2018.            Pending Culture Results     No orders found from 2018 to 2018.            Thank you for choosing [facility]       Thank you for choosing [facility] for your care. Our goal is always to provide you with excellent care. Hearing back from our patients is one way we can continue to improve our services. Please take a few minutes to complete the written survey that you may receive in the mail after you visit with us. Thank you!        [employer] Information     [employer] lets you send messages to your doctor, view your test results, renew your prescriptions, schedule appointments and more. To sign up, go to [email or web address] . Click on \"Log in\" on the left side of the screen, which will take you to the Welcome page. Then click on \"Sign up Now\" on the right side of the page.     You will be asked to enter the access code listed below, as well as some personal information. Please follow the directions to create your username and password.     Your access code is: [email or web address]  Expires: 2018  4:49 PM     Your access code will  in 90 days. If you need help " or a new code, please call your [facility] or [phone].        Care EveryWhere ID     This is your Care EveryWhere ID. This could be used by other organizations to access your [facility] medical records  [id number]        Equal Access to Services     VALERIE CROWE : Fermin Bowser, denise hardin, flynn laddalhoda perla, devendra tolentino. So [facility] [phone].    ATENCIÓN: Si habla español, tiene a arita disposición servicios gratuitos de asistencia lingüística. Llame al [phone].    We comply with applicable federal civil rights laws and [place] laws. We do not discriminate on the basis of race, color, national origin, age, disability, sex, sexual orientation, or gender identity.            After Visit Summary       This is your record. Keep this with you and show to your community pharmacist(s) and doctor(s) at your next visit.

## 2018-05-01 ENCOUNTER — OFFICE VISIT (OUTPATIENT)
Dept: FAMILY MEDICINE | Facility: OTHER | Age: 63
End: 2018-05-01
Attending: FAMILY MEDICINE
Payer: COMMERCIAL

## 2018-05-01 ENCOUNTER — RADIANT APPOINTMENT (OUTPATIENT)
Dept: GENERAL RADIOLOGY | Facility: OTHER | Age: 63
End: 2018-05-01
Attending: FAMILY MEDICINE
Payer: COMMERCIAL

## 2018-05-01 VITALS
DIASTOLIC BLOOD PRESSURE: 66 MMHG | SYSTOLIC BLOOD PRESSURE: 110 MMHG | HEIGHT: 69 IN | WEIGHT: 192 LBS | TEMPERATURE: 98.3 F | OXYGEN SATURATION: 98 % | BODY MASS INDEX: 28.44 KG/M2 | HEART RATE: 59 BPM

## 2018-05-01 DIAGNOSIS — Z91.013 SHELLFISH ALLERGY: ICD-10-CM

## 2018-05-01 DIAGNOSIS — Z00.00 ROUTINE GENERAL MEDICAL EXAMINATION AT A HEALTH CARE FACILITY: Primary | ICD-10-CM

## 2018-05-01 DIAGNOSIS — I10 ESSENTIAL HYPERTENSION: ICD-10-CM

## 2018-05-01 DIAGNOSIS — M15.0 PRIMARY OSTEOARTHRITIS INVOLVING MULTIPLE JOINTS: ICD-10-CM

## 2018-05-01 DIAGNOSIS — I25.10 CORONARY ARTERY DISEASE INVOLVING NATIVE CORONARY ARTERY OF NATIVE HEART WITHOUT ANGINA PECTORIS: ICD-10-CM

## 2018-05-01 DIAGNOSIS — R35.1 NOCTURIA: ICD-10-CM

## 2018-05-01 DIAGNOSIS — F43.21 ADJUSTMENT DISORDER WITH DEPRESSED MOOD: ICD-10-CM

## 2018-05-01 DIAGNOSIS — K40.20 NON-RECURRENT BILATERAL INGUINAL HERNIA WITHOUT OBSTRUCTION OR GANGRENE: ICD-10-CM

## 2018-05-01 DIAGNOSIS — E78.2 MIXED HYPERLIPIDEMIA: ICD-10-CM

## 2018-05-01 DIAGNOSIS — K21.9 GASTROESOPHAGEAL REFLUX DISEASE WITHOUT ESOPHAGITIS: ICD-10-CM

## 2018-05-01 DIAGNOSIS — F17.201 TOBACCO ABUSE, IN REMISSION: ICD-10-CM

## 2018-05-01 DIAGNOSIS — R73.03 PREDIABETES: ICD-10-CM

## 2018-05-01 LAB
ALBUMIN SERPL-MCNC: 3.5 G/DL (ref 3.4–5)
ALP SERPL-CCNC: 86 U/L (ref 40–150)
ALT SERPL W P-5'-P-CCNC: 35 U/L (ref 0–70)
ANION GAP SERPL CALCULATED.3IONS-SCNC: 10 MMOL/L (ref 3–14)
AST SERPL W P-5'-P-CCNC: 30 U/L (ref 0–45)
BASOPHILS # BLD AUTO: 0 10E9/L (ref 0–0.2)
BASOPHILS NFR BLD AUTO: 0.5 %
BILIRUB SERPL-MCNC: 0.6 MG/DL (ref 0.2–1.3)
BUN SERPL-MCNC: 15 MG/DL (ref 7–30)
CALCIUM SERPL-MCNC: 8.5 MG/DL (ref 8.5–10.1)
CHLORIDE SERPL-SCNC: 105 MMOL/L (ref 94–109)
CHOLEST SERPL-MCNC: 134 MG/DL
CO2 SERPL-SCNC: 26 MMOL/L (ref 20–32)
CREAT SERPL-MCNC: 0.85 MG/DL (ref 0.66–1.25)
DIFFERENTIAL METHOD BLD: ABNORMAL
EOSINOPHIL # BLD AUTO: 0.1 10E9/L (ref 0–0.7)
EOSINOPHIL NFR BLD AUTO: 1 %
ERYTHROCYTE [DISTWIDTH] IN BLOOD BY AUTOMATED COUNT: 13.3 % (ref 10–15)
EST. AVERAGE GLUCOSE BLD GHB EST-MCNC: 131 MG/DL
GFR SERPL CREATININE-BSD FRML MDRD: >90 ML/MIN/1.7M2
GLUCOSE SERPL-MCNC: 102 MG/DL (ref 70–99)
HBA1C MFR BLD: 6.2 % (ref 0–6.4)
HCT VFR BLD AUTO: 38.5 % (ref 40–53)
HDLC SERPL-MCNC: 63 MG/DL
HGB BLD-MCNC: 13.5 G/DL (ref 13.3–17.7)
IMM GRANULOCYTES # BLD: 0 10E9/L (ref 0–0.4)
IMM GRANULOCYTES NFR BLD: 0.5 %
LDLC SERPL CALC-MCNC: 49 MG/DL
LYMPHOCYTES # BLD AUTO: 2.7 10E9/L (ref 0.8–5.3)
LYMPHOCYTES NFR BLD AUTO: 32.2 %
MCH RBC QN AUTO: 32.3 PG (ref 26.5–33)
MCHC RBC AUTO-ENTMCNC: 35.1 G/DL (ref 31.5–36.5)
MCV RBC AUTO: 92 FL (ref 78–100)
MONOCYTES # BLD AUTO: 0.7 10E9/L (ref 0–1.3)
MONOCYTES NFR BLD AUTO: 7.8 %
NEUTROPHILS # BLD AUTO: 4.8 10E9/L (ref 1.6–8.3)
NEUTROPHILS NFR BLD AUTO: 58 %
NONHDLC SERPL-MCNC: 71 MG/DL
NRBC # BLD AUTO: 0 10*3/UL
NRBC BLD AUTO-RTO: 0 /100
PLATELET # BLD AUTO: 215 10E9/L (ref 150–450)
POTASSIUM SERPL-SCNC: 3.7 MMOL/L (ref 3.4–5.3)
PROT SERPL-MCNC: 7 G/DL (ref 6.8–8.8)
PSA SERPL-MCNC: 0.72 UG/L (ref 0–4)
RBC # BLD AUTO: 4.18 10E12/L (ref 4.4–5.9)
SODIUM SERPL-SCNC: 141 MMOL/L (ref 133–144)
TRIGL SERPL-MCNC: 112 MG/DL
WBC # BLD AUTO: 8.3 10E9/L (ref 4–11)

## 2018-05-01 PROCEDURE — 84153 ASSAY OF PSA TOTAL: CPT | Performed by: FAMILY MEDICINE

## 2018-05-01 PROCEDURE — 36415 COLL VENOUS BLD VENIPUNCTURE: CPT | Performed by: FAMILY MEDICINE

## 2018-05-01 PROCEDURE — 80053 COMPREHEN METABOLIC PANEL: CPT | Performed by: FAMILY MEDICINE

## 2018-05-01 PROCEDURE — 83036 HEMOGLOBIN GLYCOSYLATED A1C: CPT | Performed by: FAMILY MEDICINE

## 2018-05-01 PROCEDURE — 80061 LIPID PANEL: CPT | Performed by: FAMILY MEDICINE

## 2018-05-01 PROCEDURE — 99212 OFFICE O/P EST SF 10 MIN: CPT | Mod: 25 | Performed by: FAMILY MEDICINE

## 2018-05-01 PROCEDURE — 85025 COMPLETE CBC W/AUTO DIFF WBC: CPT | Performed by: FAMILY MEDICINE

## 2018-05-01 PROCEDURE — 71046 X-RAY EXAM CHEST 2 VIEWS: CPT | Mod: TC

## 2018-05-01 PROCEDURE — 99396 PREV VISIT EST AGE 40-64: CPT | Performed by: FAMILY MEDICINE

## 2018-05-01 RX ORDER — LISINOPRIL 10 MG/1
TABLET ORAL
Qty: 90 TABLET | Refills: 3 | Status: SHIPPED | OUTPATIENT
Start: 2018-05-01 | End: 2019-04-24

## 2018-05-01 RX ORDER — BUPROPION HYDROCHLORIDE 150 MG/1
150 TABLET ORAL EVERY MORNING
Qty: 30 TABLET | Refills: 1 | Status: SHIPPED | OUTPATIENT
Start: 2018-05-01 | End: 2018-06-04

## 2018-05-01 RX ORDER — EPINEPHRINE 0.3 MG/.3ML
0.3 INJECTION SUBCUTANEOUS PRN
Qty: 0.6 ML | Refills: 3 | Status: SHIPPED | OUTPATIENT
Start: 2018-05-01

## 2018-05-01 ASSESSMENT — ANXIETY QUESTIONNAIRES
6. BECOMING EASILY ANNOYED OR IRRITABLE: SEVERAL DAYS
4. TROUBLE RELAXING: SEVERAL DAYS
3. WORRYING TOO MUCH ABOUT DIFFERENT THINGS: NOT AT ALL
2. NOT BEING ABLE TO STOP OR CONTROL WORRYING: NOT AT ALL
1. FEELING NERVOUS, ANXIOUS, OR ON EDGE: SEVERAL DAYS
5. BEING SO RESTLESS THAT IT IS HARD TO SIT STILL: NOT AT ALL
GAD7 TOTAL SCORE: 3
7. FEELING AFRAID AS IF SOMETHING AWFUL MIGHT HAPPEN: NOT AT ALL
IF YOU CHECKED OFF ANY PROBLEMS ON THIS QUESTIONNAIRE, HOW DIFFICULT HAVE THESE PROBLEMS MADE IT FOR YOU TO DO YOUR WORK, TAKE CARE OF THINGS AT HOME, OR GET ALONG WITH OTHER PEOPLE: SOMEWHAT DIFFICULT

## 2018-05-01 ASSESSMENT — PAIN SCALES - GENERAL: PAINLEVEL: MILD PAIN (3)

## 2018-05-01 NOTE — NURSING NOTE
"Chief Complaint   Patient presents with     Physical       Initial /66  Pulse 59  Temp 98.3  F (36.8  C)  Ht 5' 8.75\" (1.746 m)  Wt 192 lb (87.1 kg)  SpO2 98%  BMI 28.56 kg/m2 Estimated body mass index is 28.56 kg/(m^2) as calculated from the following:    Height as of this encounter: 5' 8.75\" (1.746 m).    Weight as of this encounter: 192 lb (87.1 kg).  Medication Reconciliation: complete     Calvin Torre      "

## 2018-05-01 NOTE — LETTER
My Depression Action Plan  Name: Anthony Steele   Date of Birth 1955  Date: 5/1/2018    My doctor: Abdulkadir Huizar   My clinic: [facility] [place]  [place] [address]  [phone]          GREEN    ZONE   Good Control    What it looks like:     Things are going generally well. You have normal up s and down s. You may even feel depressed from time to time, but bad moods usually last less than a day.   What you need to do:  1. Continue to care for yourself (see self care plan)  2. Check your depression survival kit and update it as needed  3. Follow your physician s recommendations including any medication.  4. Do not stop taking medication unless you consult with your physician first.           YELLOW         ZONE Getting Worse    What it looks like:     Depression is starting to interfere with your life.     It may be hard to get out of bed; you may be starting to isolate yourself from others.    Symptoms of depression are starting to last most all day and this has happened for several days.     You may have suicidal thoughts but they are not constant.   What you need to do:     1. Call your care team, your response to treatment will improve if you keep your care team informed of your progress. Yellow periods are signs an adjustment may need to be made.     2. Continue your self-care, even if you have to fake it!    3. Talk to someone in your support network    4. Open up your depression survival kit           RED    ZONE Medical Alert - Get Help    What it looks like:     Depression is seriously interfering with your life.     You may experience these or other symptoms: You can t get out of bed most days, can t work or engage in other necessary activities, you have trouble taking care of basic hygiene, or basic responsibilities, thoughts of suicide or death that will not go away, self-injurious behavior.     What you need to do:  1. Call your care team and request a  same-day appointment. If they are not available (weekends or after hours) call your local crisis line, emergency room or 911.            Depression Self Care Plan / Survival Kit    Self-Care for Depression  Here s the deal. Your body and mind are really not as separate as most people think.  What you do and think affects how you feel and how you feel influences what you do and think. This means if you do things that people who feel good do, it will help you feel better.  Sometimes this is all it takes.  There is also a place for medication and therapy depending on how severe your depression is, so be sure to consult with your medical provider and/ or Behavioral Health Consultant if your symptoms are worsening or not improving.     In order to better manage my stress, I will:    Exercise  Get some form of exercise, every day. This will help reduce pain and release endorphins, the  feel good  chemicals in your brain. This is almost as good as taking antidepressants!  This is not the same as joining a gym and then never going! (they count on that by the way ) It can be as simple as just going for a walk or doing some gardening, anything that will get you moving.      Hygiene   Maintain good hygiene (Get out of bed in the morning, Make your bed, Brush your teeth, Take a shower, and Get dressed like you were going to work, even if you are unemployed).  If your clothes don't fit try to get ones that do.    Diet  I will strive to eat foods that are good for me, drink plenty of water, and avoid excessive sugar, caffeine, alcohol, and other mood-altering substances.  Some foods that are helpful in depression are: complex carbohydrates, B vitamins, flaxseed, fish or fish oil, fresh fruits and vegetables.    Psychotherapy  I agree to participate in Individual Therapy (if recommended).    Medication  If prescribed medications, I agree to take them.  Missing doses can result in serious side effects.  I understand that drinking  alcohol, or other illicit drug use, may cause potential side effects.  I will not stop my medication abruptly without first discussing it with my provider.    Staying Connected With Others  I will stay in touch with my friends, family members, and my primary care provider/team.    Use your imagination  Be creative.  We all have a creative side; it doesn t matter if it s oil painting, sand castles, or mud pies! This will also kick up the endorphins.    Witness Beauty  (AKA stop and smell the roses) Take a look outside, even in mid-winter. Notice colors, textures. Watch the squirrels and birds.     Service to others  Be of service to others.  There is always someone else in need.  By helping others we can  get out of ourselves  and remember the really important things.  This also provides opportunities for practicing all the other parts of the program.    Humor  Laugh and be silly!  Adjust your TV habits for less news and crime-drama and more comedy.    Control your stress  Try breathing deep, massage therapy, biofeedback, and meditation. Find time to relax each day.     My support system    Clinic Contact:  Phone number:    Contact 1:  Phone number:    Contact 2:  Phone number:    Yazid[employer]/:  Phone number:    Therapist:  Phone number:    Local crisis center:    Phone number:    Other community support:  Phone number:

## 2018-05-01 NOTE — MR AVS SNAPSHOT
After Visit Summary   5/1/2018    Anthony Steele    MRN: [id number]           Patient Information     Date Of Birth          1955        Visit Information        Provider Department      5/1/2018 8:00 AM Abdulkadir Huizar MD [facility] [place]        Today's Diagnoses     Routine general medical examination at a health care facility    -  1    Coronary artery disease involving native coronary artery of native heart without angina pectoris        Essential hypertension        Gastroesophageal reflux disease without esophagitis        Tobacco abuse, in remission        Nocturia        Mixed hyperlipidemia        Prediabetes        Adjustment disorder with depressed mood        Non-recurrent bilateral inguinal hernia without obstruction or gangrene        Primary osteoarthritis involving multiple joints        Shellfish allergy          Care Instructions      Preventive Health Recommendations  Male Ages 50 - 64    Yearly exam:             See your health care provider every year in order to  o   Review health changes.   o   Discuss preventive care.    o   Review your medicines if your doctor has prescribed any.     Have a cholesterol test every 5 years, or more frequently if you are at risk for high cholesterol/heart disease.     Have a diabetes test (fasting glucose) every three years. If you are at risk for diabetes, you should have this test more often.     Have a colonoscopy at age 50, or have a yearly FIT test (stool test). These exams will check for colon cancer.      Talk with your health care provider about whether or not a prostate cancer screening test (PSA) is right for you.    You should be tested each year for STDs (sexually transmitted diseases), if you re at risk.     Shots: Get a flu shot each year. Get a tetanus shot every 10 years.     Nutrition:    Eat at least 5 servings of fruits and vegetables daily.     Eat whole-grain bread, whole-wheat pasta and brown rice instead of  white grains and rice.     Talk to your provider about Calcium and Vitamin D.     Lifestyle    Exercise for at least 150 minutes a week (30 minutes a day, 5 days a week). This will help you control your weight and prevent disease.     Limit alcohol to one drink per day.     No smoking.     Wear sunscreen to prevent skin cancer.     See your dentist every six months for an exam and cleaning.     See your eye doctor every 1 to 2 years.  Results for orders placed or performed in visit on 05/01/18 (from the past 24 hour(s))   CBC with platelets differential   Result Value Ref Range    WBC 8.3 4.0 - 11.0 10e9/L    RBC Count 4.18 (L) 4.4 - 5.9 10e12/L    Hemoglobin 13.5 13.3 - 17.7 g/dL    Hematocrit 38.5 (L) 40.0 - 53.0 %    MCV 92 78 - 100 fl    MCH 32.3 26.5 - 33.0 pg    MCHC 35.1 31.5 - 36.5 g/dL    RDW 13.3 10.0 - 15.0 %    Platelet Count 215 150 - 450 10e9/L    Diff Method Automated Method     % Neutrophils 58.0 %    % Lymphocytes 32.2 %    % Monocytes 7.8 %    % Eosinophils 1.0 %    % Basophils 0.5 %    % Immature Granulocytes 0.5 %    Nucleated RBCs 0 0 /100    Absolute Neutrophil 4.8 1.6 - 8.3 10e9/L    Absolute Lymphocytes 2.7 0.8 - 5.3 10e9/L    Absolute Monocytes 0.7 0.0 - 1.3 10e9/L    Absolute Eosinophils 0.1 0.0 - 0.7 10e9/L    Absolute Basophils 0.0 0.0 - 0.2 10e9/L    Abs Immature Granulocytes 0.0 0 - 0.4 10e9/L    Absolute Nucleated RBC 0.0    Comprehensive metabolic panel   Result Value Ref Range    Sodium 141 133 - 144 mmol/L    Potassium 3.7 3.4 - 5.3 mmol/L    Chloride 105 94 - 109 mmol/L    Carbon Dioxide 26 20 - 32 mmol/L    Anion Gap 10 3 - 14 mmol/L    Glucose 102 (H) 70 - 99 mg/dL    Urea Nitrogen 15 7 - 30 mg/dL    Creatinine 0.85 0.66 - 1.25 mg/dL    GFR Estimate >90 >60 mL/min/1.7m2    GFR Estimate If Black >90 >60 mL/min/1.7m2    Calcium 8.5 8.5 - 10.1 mg/dL    Bilirubin Total 0.6 0.2 - 1.3 mg/dL    Albumin 3.5 3.4 - 5.0 g/dL    Protein Total 7.0 6.8 - 8.8 g/dL    Alkaline Phosphatase 86 40  - 150 U/L    ALT 35 0 - 70 U/L    AST 30 0 - 45 U/L   Lipid Profile   Result Value Ref Range    Cholesterol 134 <200 mg/dL    Triglycerides 112 <150 mg/dL    HDL Cholesterol 63 >39 mg/dL    LDL Cholesterol Calculated 49 <100 mg/dL    Non HDL Cholesterol 71 <130 mg/dL   PSA tumor marker   Result Value Ref Range    PSA 0.72 0 - 4 ug/L   Hemoglobin A1c   Result Value Ref Range    Hemoglobin A1C 6.2 0 - 6.4 %   Estimated Average Glucose   Result Value Ref Range    Estimated Average Glucose 131 mg/dL   XR Chest 2 Views    Narrative    PROCEDURE:  XR CHEST 2 VW    HISTORY:  ; Coronary artery disease involving native coronary artery  of native heart without angina pectoris; Essential hypertension;  Tobacco abuse, in remission.     COMPARISON:  None.    FINDINGS:   The cardiac silhouette is normal in size. The pulmonary vasculature is  normal.  The lungs are clear. No pleural effusion or pneumothorax.      Impression    IMPRESSION:  No acute cardiopulmonary disease.      REBECCA HUSAIN MD                     Follow-ups after your visit        Additional Services     GENERAL SURG ADULT REFERRAL       Your provider has referred you to:[facility] /Dr Tomlin - has seen him in for issue- had to cancel due to ankle fx  Please be aware that coverage of these services is subject to the terms and limitations of your health insurance plan.  Call member services at your health plan with any benefit or coverage questions.      Please bring the following with you to your appointment:    (1) Any X-Rays, CTs or MRIs which have been performed.  Contact the facility where they were done to arrange for  prior to your scheduled appointment.   (2) List of current medications   (3) This referral request   (4) Any documents/labs given to you for this referral                  Your next 10 appointments already scheduled     May 21, 2018  9:45 AM CDT   (Arrive by 9:30 AM)   New Visit with Louis Tomlin DO   [facility]  "([facility] )    [address] [place] [address]   [phone]            May 29, 2018  4:00 PM CDT   (Arrive by 3:45 PM)   SHORT with Abdulkadir Huizar MD   [facility] [place] ([facility] )    [address] [place] [address]   [phone]              Who to contact     If you have questions or need follow up information about today's clinic visit or your schedule please contact [facility] directly at [phone].  Normal or non-critical lab and imaging results will be communicated to you by MyChart, letter or phone within 4 business days after the clinic has received the results. If you do not hear from us within 7 days, please contact the clinic through [facility] or phone. If you have a critical or abnormal lab result, we will notify you by phone as soon as possible.  Submit refill requests through [facility] or call your pharmacy and they will forward the refill request to us. Please allow 3 business days for your refill to be completed.          Additional Information About Your Visit        MyChart Information     [facility] lets you send messages to your doctor, view your test results, renew your prescriptions, schedule appointments and more. To sign up, go to [email or web address] . Click on \"Log in\" on the left side of the screen, which will take you to the Welcome page. Then click on \"Sign up Now\" on the right side of the page.     You will be asked to enter the access code listed below, as well as some personal information. Please follow the directions to create your username and password.     Your access code is: [email or web address]  Expires: 2018  4:49 PM     Your access code will  in 90 days. If you need help or a new code, please call your [place] clinic or [phone].        Care EveryWhere ID     This is your Care EveryWhere ID. This could be used by other organizations to access your [place] medical records  [id number]   " "     Your Vitals Were     Pulse Temperature Height Pulse Oximetry BMI (Body Mass Index)       59 98.3  F (36.8  C) 5' 8.75\" (1.746 m) 98% 28.56 kg/m2        Blood Pressure from Last 3 Encounters:   05/01/18 110/66   04/26/18 133/81   11/01/17 136/76    Weight from Last 3 Encounters:   05/01/18 192 lb (87.1 kg)   04/26/18 190 lb (86.2 kg)   11/01/17 195 lb (88.5 kg)              We Performed the Following     CBC with platelets differential     Comprehensive metabolic panel     Estimated Average Glucose     GENERAL SURG ADULT REFERRAL     Hemoglobin A1c     Lipid Profile     OFFICE/OUTPT VISIT,EST,LEVL III     PSA tumor marker     XR Chest 2 Views          Today's Medication Changes          These changes are accurate as of 5/1/18  9:28 AM.  If you have any questions, ask your nurse or doctor.               Start taking these medicines.        Dose/Directions    EPINEPHrine 0.3 MG/0.3ML injection 2-pack   Commonly known as:  EPIPEN/ADRENACLICK/or ANY BX GENERIC EQUIV   Used for:  Shellfish allergy   Started by:  Abdulkadir Huizar MD        Dose:  0.3 mg   Inject 0.3 mLs (0.3 mg) into the muscle as needed for anaphylaxis   Quantity:  0.6 mL   Refills:  3         These medicines have changed or have updated prescriptions.        Dose/Directions    buPROPion 150 MG 24 hr tablet   Commonly known as:  WELLBUTRIN XL   This may have changed:    - medication strength  - how much to take  - how to take this  - when to take this  - additional instructions   Used for:  Adjustment disorder with depressed mood   Changed by:  Abdulkadir Huizar MD        Dose:  150 mg   Take 1 tablet (150 mg) by mouth every morning   Quantity:  30 tablet   Refills:  1       lisinopril 10 MG tablet   Commonly known as:  PRINIVIL/ZESTRIL   This may have changed:  See the new instructions.   Used for:  Mixed hyperlipidemia   Changed by:  Abdulkadir Huizar MD        TAKE 1 TABLET BY MOUTH DAILY- GENERIC FOR ZESTRIL   Quantity:  90 tablet   Refills:  3    "      Stop taking these medicines if you haven't already. Please contact your care team if you have questions.     predniSONE 20 MG tablet   Commonly known as:  DELTASONE   Stopped by:  Abdulkadir Huizar MD                Where to get your medicines      These medications were sent to [id number] - [place], [place] - [address]  [address] [address]     Phone:  [phone]     buPROPion 150 MG 24 hr tablet    EPINEPHrine 0.3 MG/0.3ML injection 2-pack    lisinopril 10 MG tablet                Primary Care Provider Office Phone # Fax #    Abdulkadir Huizar [phone] [address] [address]        Equal Access to Services     [facility]: Hadii stephanie sadlero Solana, waaxda luqadaha, qaybta kaalmada adeegyada, devendra mustafa . So [facility] [phone].    ATENCIÓN: Si habla español, tiene a arita disposición servicios gratuitos de asistencia lingüística. Llame al [phone].    We comply with applicable federal civil rights laws and [place] laws. We do not discriminate on the basis of race, color, national origin, age, disability, sex, sexual orientation, or gender identity.            Thank you!     Thank you for choosing [facility]  for your care. Our goal is always to provide you with excellent care. Hearing back from our patients is one way we can continue to improve our services. Please take a few minutes to complete the written survey that you may receive in the mail after your visit with us. Thank you!             Your Updated Medication List - Protect others around you: Learn how to safely use, store and throw away your medicines at www.disposemymeds.org.          This list is accurate as of 5/1/18  9:28 AM.  Always use your most recent med list.                   Brand Name Dispense Instructions for use Diagnosis    amoxicillin-clavulanate 875-125 MG per tablet    AUGMENTIN    20 tablet    Take 1 tablet by  mouth 2 times daily        aspirin 325 MG tablet     180 tablet    Take 1 tablet (325 mg) by mouth daily    Coronary artery disease involving native coronary artery of native heart without angina pectoris       atorvastatin 40 MG tablet    LIPITOR    30 tablet    TAKE 1 TABLET DAILY BY MOUTH - GENERIC FOR LIPITOR    Mixed hyperlipidemia       buPROPion 150 MG 24 hr tablet    WELLBUTRIN XL    30 tablet    Take 1 tablet (150 mg) by mouth every morning    Adjustment disorder with depressed mood       EPINEPHrine 0.3 MG/0.3ML injection 2-pack    EPIPEN/ADRENACLICK/or ANY BX GENERIC EQUIV    0.6 mL    Inject 0.3 mLs (0.3 mg) into the muscle as needed for anaphylaxis    Shellfish allergy       HYDROcodone-acetaminophen 5-325 MG per tablet    NORCO    6 tablet    Take 1-2 tablets by mouth every 8 hours as needed for moderate to severe pain        lisinopril 10 MG tablet    PRINIVIL/ZESTRIL    90 tablet    TAKE 1 TABLET BY MOUTH DAILY- GENERIC FOR ZESTRIL    Mixed hyperlipidemia       metoprolol tartrate 25 MG tablet    LOPRESSOR    180 tablet    TAKE 1 TABLET BY MOUTH TWICE DAILY    Essential hypertension with goal blood pressure less than 140/90       nitroGLYcerin 0.4 MG sublingual tablet    NITROSTAT    25 tablet    PLACE 1 TABLET UNDER THE TONGUE EVERY FIVE MINUTES AS NEEDED FOR CHEST PAIN. DO NOT CRUSH; MAXIMUM OF 3 DOSES IN 15 MINUTES.    Coronary artery disease involving native coronary artery of native heart without angina pectoris       OMEGA-3 FISH OIL PO      Take 1 g by mouth        omeprazole 40 MG capsule    priLOSEC    90 capsule    TAKE 1 CAPSULE BY MOUTH ONCE DAILY. TAKE 30 TO 60 MINUTES BEFORE A MEAL.    Gastroesophageal reflux disease without esophagitis

## 2018-05-01 NOTE — PROGRESS NOTES
SUBJECTIVE:   CC: Anthony Steele is an 62 year old male who presents for preventative health visit.     Healthy Habits:    Do you get at least three servings of calcium containing foods daily (dairy, green leafy vegetables, etc.)? yes    Amount of exercise or daily activities, outside of work: 3 day(s) per week    Problems taking medications regularly No    Medication side effects: Yes memory problems, increased fatigue and joint issues    Have you had an eye exam in the past two years? yes    Do you see a dentist twice per year? yes    Do you have sleep apnea, excessive snoring or daytime drowsiness?no       Musculoskeletal problem/pain      Duration: awhile    Description  Location: joint pain, has pain in right wrist and hand and bottom of left foot, bilateral knee numbness    Intensity:  moderate    Accompanying signs and symptoms: numbness and swelling    History  Previous similar problem: no   Previous evaluation:  none    Precipitating or alleviating factors:  Trauma or overuse: no   Aggravating factors include: none    Therapies tried and outcome: nothing    Got better    Has lots of DJD    Memory issues      Duration: feels since starting wellbutrin    Description (location/character/radiation): losses track of conversations, happens within seconds    Intensity:  moderate    Accompanying signs and symptoms: insomnia, restless sleep, trouble staying asleep    History (similar episodes/previous evaluation): family has also noticed it happening    Precipitating or alleviating factors: None    Therapies tried and outcome: has cut back on alcohol, taking tylenol pm at night  Six Item Cognitive Impairment Test   (6CIT):    What year is it?                               Correct - 0 points  What month is it?                               Correct - 0 points    Give the patient an address to remember with five components:   [place]ad[place] Raymundo ( first and last name - 2 components)   [address]  (number and name of  street - 2 components)   [place] ( city - 1 component)    About what time is it (within the hour)? Correct - 0 points  Count backwards from 20 to 1:   Correct - 0 points  Say the months of the year in reverse: Correct - 0 points  Repeat the address phrase:   Correct - 0 points    Total 6CIT Score:      0/28    Interpretation: The 6CIT uses an inverse score and questions are weighted to produce a total out of 28. Scores of 0-7 are considered normal and 8 or more significant.    Advantages The test has high sensitivity without compromising specificity even in mild dementia. It is easy to translate linguistically and culturally.  Disadvantages The main disadvantage is in the scoring and weighting of the test, which is initially confusing, however computer models have simplified this greatly.    Probability Statistics: At the 7/8 cut off: Overall figures sensitivity 90% specificity 100%, in mild dementia sensitivity = 78% , specificity = 100%      Copyright 2000 The [facility], [facility]. Courtesy of Dr. Manny Brice       ALLERGIES      Duration: last summer    Description:   Nasal congestion: no  Sneezing: no  Red, itchy eyes: no    Accompanying signs and symptoms: swelling mouth, lips and neck    History (similar episodes/allergy testing): had eating some lobster    Precipitating or alleviating factors: None    Therapies tried and outcome: benadryl           Pre- Diabetes Follow-up      Patient is checking blood sugars: not at all    Diabetic concerns: None     Symptoms of hypoglycemia (low blood sugar): none     Paresthesias (numbness or burning in feet) or sores: No     Date of last diabetic eye exam: 2 years ago    Hyperlipidemia Follow-Up      Rate your low fat/cholesterol diet?: good    Taking statin?  Yes, no muscle aches from statin    Other lipid medications/supplements?:  Fish oil/Omega 3, dose 2g without side effects    Hypertension Follow-up      Outpatient blood pressures are not  being checked.    Low Salt Diet: no added salt    BP Readings from Last 2 Encounters:   05/01/18 110/66   04/26/18 133/81     Hemoglobin A1C (%)   Date Value   03/06/2017 5.8   02/19/2014 6.1     LDL Cholesterol Calculated (mg/dL)   Date Value   03/06/2017 117 (H)   07/28/2015 64     Vascular Disease Follow-up:  Coronary Artery Disease (CAD)      Chest pain or pressure, left side neck or arm pain: No    Shortness of breath/increased sweats/nausea with exertion: No    Pain in calves walking 1-2 blocks: No    Worsened or new symptoms since last visit: No    Nitroglycerin use: no    Daily aspirin use: Yes    Depression Followup    Status since last visit: Stable would like to wean off of wellbutrin    See PHQ-9 for current symptoms.  Other associated symptoms: None    Complicating factors:   Significant life event:  No   Current substance abuse:  None  Anxiety or Panic symptoms:  No    Feels to have problems with focus and loss of train of thought with conversations with meds??    PHQ-9 4/10/2017 5/8/2017 11/1/2017   Total Score 5 4 5   Q9: Suicide Ideation Not at all Not at all Not at all     PHQ-9 SCORE 5/8/2017 11/1/2017 5/1/2018   Total Score - - -   Total Score 4 5 5     MACEY-7 SCORE 4/10/2017 5/8/2017 5/1/2018   Total Score 8 4 3         PHQ-9  English  PHQ-9   Any Language  Suicide Assessment Five-step Evaluation and Treatment (SAFE-T)    Today's PHQ-2 Score: No flowsheet data found.    Abuse: Current or Past(Physical, Sexual or Emotional)- No  Do you feel safe in your environment - Yes    Social History   Substance Use Topics     Smoking status: Former Smoker     Packs/day: 1.00     Years: 20.00     Smokeless tobacco: Never Used      Comment: Tried to quit: yes; longest period tobacco-free- 10 years     Alcohol use Yes      Comment: Occasionally      If you drink alcohol do you typically have >3 drinks per day or >7 drinks per week? No                      Last PSA:   PSA   Date Value Ref Range Status    03/06/2017 0.77 0 - 4 ug/L Final     Comment:     Assay Method:  Chemiluminescence using Siemens Vista analyzer       Reviewed orders with patient. Reviewed health maintenance and updated orders accordingly - Yes  Labs reviewed in EPIC    Reviewed and updated as needed this visit by clinical staff  Tobacco  Allergies  Meds  Med Hx  Surg Hx  Fam Hx  Soc Hx        Reviewed and updated as needed this visit by Provider        Past Medical History:   Diagnosis Date     Acute myocardial infarction, unspecified site, episode of care unspecified 08/03/2011     Adjustment disorder with mixed anxiety and depressed mood 05/23/2012     CAD (coronary artery disease) 3/19/2013     Colon polyps      Coronary atherosclerosis of unspecified type of vessel, native or graft 01/06/2012     Displacement of cervical intervertebral disc without myelopathy      GERD (gastroesophageal reflux disease)      H/O ETOH abuse      HTN (hypertension)      Mixed hyperlipidemia 08/03/2011     Personal history of tobacco use, presenting hazards to health 09/06/2011    Tobacco abuse, in remission     Postsurgical percutaneous transluminal coronary angioplasty status 08/03/2011     Prediabetes      Status post coronary angiogram 3/19/2013     Tobacco abuse, in remission       Past Surgical History:   Procedure Laterality Date     ANGIOPLASTY  03100226    Failed angioplasty of OM vessel     ARTHROSCOPY KNEE      RT     BACK SURGERY      C5-6 fusion     Carpal Tunnel Syndrome  2003    Resolved from Problem List  2012     COLONOSCOPY  2008    colonoscopy with polypectomy/ Repeat in 2011     COLONOSCOPY  3/24/2014    Repeat in 2019//Procedure: COLONOSCOPY;  COLONOSCOPY;  Surgeon: Jessica Washington MD;  Location: [facility] OR     RELEASE CARPAL TUNNEL      RT     STENT  07/2011    Myocardial Infarction; Stent placement [facility]     STENT, CORONARY, LIZZIE  2012       ROS:  C: NEGATIVE for fever, chills, change in weight  I: NEGATIVE for worrisome rashes,  "moles or lesions  E: NEGATIVE for vision changes or irritation  ENT: NEGATIVE for ear, mouth and throat problems  R: NEGATIVE for significant cough or SOB  CV: NEGATIVE for chest pain, palpitations or peripheral edema  GI: NEGATIVE for nausea, abdominal pain, heartburn, or change in bowel habits   male: negative for dysuria, hematuria, decreased urinary stream, erectile dysfunction, urethral discharge  M: NEGATIVE for significant arthralgias or myalgia  N: NEGATIVE for weakness, dizziness or paresthesias  P: NEGATIVE for changes in mood or affect    OBJECTIVE:   /66  Pulse 59  Temp 98.3  F (36.8  C)  Ht 5' 8.75\" (1.746 m)  Wt 192 lb (87.1 kg)  SpO2 98%  BMI 28.56 kg/m2  EXAM:  GENERAL: healthy, alert and no distress  EYES: Eyes grossly normal to inspection, PERRL and conjunctivae and sclerae normal  HENT: ear canals and TM's normal, nose and mouth without ulcers or lesions  NECK: no adenopathy, no asymmetry, masses, or scars and thyroid normal to palpation  RESP: lungs clear to auscultation - no rales, rhonchi or wheezes  CV: regular rate and rhythm, normal S1 S2, no S3 or S4, no murmur, click or rub, no peripheral edema and peripheral pulses strong  ABDOMEN: soft, nontender, no hepatosplenomegaly, no masses and bowel sounds normal   (male): normal male genitalia without lesions or urethral discharge, small bilateral sliding  hernia  RECTAL: normal sphincter tone, no rectal masses, prostate normal size, smooth, nontender without nodules or masses  MS: no gross musculoskeletal defects noted, no edema, lots of djd changes in hands   SKIN: no suspicious lesions or rashes  NEURO: Normal strength and tone, mentation intact and speech normal  PSYCH: mentation appears normal, affect normal/bright    Results for orders placed or performed in visit on 05/01/18 (from the past 24 hour(s))   CBC with platelets differential   Result Value Ref Range    WBC 8.3 4.0 - 11.0 10e9/L    RBC Count 4.18 (L) 4.4 - 5.9 " 10e12/L    Hemoglobin 13.5 13.3 - 17.7 g/dL    Hematocrit 38.5 (L) 40.0 - 53.0 %    MCV 92 78 - 100 fl    MCH 32.3 26.5 - 33.0 pg    MCHC 35.1 31.5 - 36.5 g/dL    RDW 13.3 10.0 - 15.0 %    Platelet Count 215 150 - 450 10e9/L    Diff Method Automated Method     % Neutrophils 58.0 %    % Lymphocytes 32.2 %    % Monocytes 7.8 %    % Eosinophils 1.0 %    % Basophils 0.5 %    % Immature Granulocytes 0.5 %    Nucleated RBCs 0 0 /100    Absolute Neutrophil 4.8 1.6 - 8.3 10e9/L    Absolute Lymphocytes 2.7 0.8 - 5.3 10e9/L    Absolute Monocytes 0.7 0.0 - 1.3 10e9/L    Absolute Eosinophils 0.1 0.0 - 0.7 10e9/L    Absolute Basophils 0.0 0.0 - 0.2 10e9/L    Abs Immature Granulocytes 0.0 0 - 0.4 10e9/L    Absolute Nucleated RBC 0.0    Comprehensive metabolic panel   Result Value Ref Range    Sodium 141 133 - 144 mmol/L    Potassium 3.7 3.4 - 5.3 mmol/L    Chloride 105 94 - 109 mmol/L    Carbon Dioxide 26 20 - 32 mmol/L    Anion Gap 10 3 - 14 mmol/L    Glucose 102 (H) 70 - 99 mg/dL    Urea Nitrogen 15 7 - 30 mg/dL    Creatinine 0.85 0.66 - 1.25 mg/dL    GFR Estimate >90 >60 mL/min/1.7m2    GFR Estimate If Black >90 >60 mL/min/1.7m2    Calcium 8.5 8.5 - 10.1 mg/dL    Bilirubin Total 0.6 0.2 - 1.3 mg/dL    Albumin 3.5 3.4 - 5.0 g/dL    Protein Total 7.0 6.8 - 8.8 g/dL    Alkaline Phosphatase 86 40 - 150 U/L    ALT 35 0 - 70 U/L    AST 30 0 - 45 U/L   Lipid Profile   Result Value Ref Range    Cholesterol 134 <200 mg/dL    Triglycerides 112 <150 mg/dL    HDL Cholesterol 63 >39 mg/dL    LDL Cholesterol Calculated 49 <100 mg/dL    Non HDL Cholesterol 71 <130 mg/dL   PSA tumor marker   Result Value Ref Range    PSA 0.72 0 - 4 ug/L   Hemoglobin A1c   Result Value Ref Range    Hemoglobin A1C 6.2 0 - 6.4 %   Estimated Average Glucose   Result Value Ref Range    Estimated Average Glucose 131 mg/dL   XR Chest 2 Views    Narrative    PROCEDURE:  XR CHEST 2 VW    HISTORY:  ; Coronary artery disease involving native coronary artery  of native  heart without angina pectoris; Essential hypertension;  Tobacco abuse, in remission.     COMPARISON:  None.    FINDINGS:   The cardiac silhouette is normal in size. The pulmonary vasculature is  normal.  The lungs are clear. No pleural effusion or pneumothorax.      Impression    IMPRESSION:  No acute cardiopulmonary disease.      REBECCA HUSAIN MD       ASSESSMENT/PLAN:   1. Routine general medical examination at a health care facility  Overall doing well.  - Estimated Average Glucose    2. Coronary artery disease involving native coronary artery of native heart without angina pectoris  Stable - no concerns. Continue current medications and behavioral changes.   - CBC with platelets differential; Future  - Comprehensive metabolic panel; Future  - Lipid Profile; Future  - XR Chest 2 Views; Future  - CBC with platelets differential  - Comprehensive metabolic panel  - Lipid Profile  - XR Chest 2 Views    3. Essential hypertension  Stable on meds. Continue current medications and behavioral changes.   - Comprehensive metabolic panel; Future  - Lipid Profile; Future  - XR Chest 2 Views; Future  - Comprehensive metabolic panel  - Lipid Profile  - XR Chest 2 Views    4. Gastroesophageal reflux disease without esophagitis  Stable- Continue current medications and behavioral changes.   - CBC with platelets differential; Future  - Comprehensive metabolic panel; Future  - CBC with platelets differential  - Comprehensive metabolic panel    5. Tobacco abuse, in remission  Stable CXR and off tobacco  - XR Chest 2 Views; Future  - XR Chest 2 Views    6. Nocturia  Stable - psa ok   - PSA tumor marker; Future  - PSA tumor marker    7. Mixed hyperlipidemia  Stable on meds. On statin   - Comprehensive metabolic panel; Future  - Lipid Profile; Future  - Comprehensive metabolic panel  - Lipid Profile    8. Prediabetes  Still ok - discussed A1C- see in a year   - Comprehensive metabolic panel; Future  - Lipid Profile; Future  -  "Hemoglobin A1c; Future  - Comprehensive metabolic panel  - Lipid Profile  - Hemoglobin A1c    9. Adjustment disorder with depressed mood  Doing better BUT has some focus and concentration issues- discussed. Will decrease meds to see if med related f/u in 4-6 weeks.   - buPROPion (WELLBUTRIN XL) 150 MG 24 hr tablet; Take 1 tablet (150 mg) by mouth every morning  Dispense: 30 tablet; Refill: 1    10. Non-recurrent bilateral inguinal hernia without obstruction or gangrene  Will send back to surgery - cancelled surgery last year due to ankle fx  - GENERAL SURG ADULT REFERRAL    11. Primary osteoarthritis involving multiple joints  Discussed. Continue current medications and behavioral changes. Discussed in length conservative measures of OTC medications for pain, Ice/Heat, elevation and the concept of R.I.C.E.. Continue behavioral changes and proper body mechanics to allow for healing. Follow up as directed.   Has had Rheum w/u in past 2014- all negative     12. Shellfish allergy  Discussed.  Will give epipen. Sound to get a significant reaction   - EPINEPHrine (EPIPEN/ADRENACLICK/OR ANY BX GENERIC EQUIV) 0.3 MG/0.3ML injection 2-pack; Inject 0.3 mLs (0.3 mg) into the muscle as needed for anaphylaxis  Dispense: 0.6 mL; Refill: 3    COUNSELING:  Reviewed preventive health counseling, as reflected in patient instructions       Regular exercise       Healthy diet/nutrition       Colon cancer screening       Prostate cancer screening       reports that he has quit smoking. He has a 20.00 pack-year smoking history. He has never used smokeless tobacco.    Estimated body mass index is 28.56 kg/(m^2) as calculated from the following:    Height as of this encounter: 5' 8.75\" (1.746 m).    Weight as of this encounter: 192 lb (87.1 kg).   Weight management plan: Discussed healthy diet and exercise guidelines and patient will follow up in 12 months in clinic to re-evaluate.    Counseling Resources:  ATP IV Guidelines  Pooled " Cohorts Equation Calculator  FRAX Risk Assessment  ICSI Preventive Guidelines  Dietary Guidelines for Americans, 2010  USDA's MyPlate  ASA Prophylaxis  Lung CA Screening    Abdulkadir Huizar MD  [facility]

## 2018-05-01 NOTE — PATIENT INSTRUCTIONS
Preventive Health Recommendations  Male Ages 50   64    Yearly exam:             See your health care provider every year in order to  o   Review health changes.   o   Discuss preventive care.    o   Review your medicines if your doctor has prescribed any.     Have a cholesterol test every 5 years, or more frequently if you are at risk for high cholesterol/heart disease.     Have a diabetes test (fasting glucose) every three years. If you are at risk for diabetes, you should have this test more often.     Have a colonoscopy at age 50, or have a yearly FIT test (stool test). These exams will check for colon cancer.      Talk with your health care provider about whether or not a prostate cancer screening test (PSA) is right for you.    You should be tested each year for STDs (sexually transmitted diseases), if you re at risk.     Shots: Get a flu shot each year. Get a tetanus shot every 10 years.     Nutrition:    Eat at least 5 servings of fruits and vegetables daily.     Eat whole-grain bread, whole-wheat pasta and brown rice instead of white grains and rice.     Talk to your provider about Calcium and Vitamin D.     Lifestyle    Exercise for at least 150 minutes a week (30 minutes a day, 5 days a week). This will help you control your weight and prevent disease.     Limit alcohol to one drink per day.     No smoking.     Wear sunscreen to prevent skin cancer.     See your dentist every six months for an exam and cleaning.     See your eye doctor every 1 to 2 years.  Results for orders placed or performed in visit on 05/01/18 (from the past 24 hour(s))   CBC with platelets differential   Result Value Ref Range    WBC 8.3 4.0 - 11.0 10e9/L    RBC Count 4.18 (L) 4.4 - 5.9 10e12/L    Hemoglobin 13.5 13.3 - 17.7 g/dL    Hematocrit 38.5 (L) 40.0 - 53.0 %    MCV 92 78 - 100 fl    MCH 32.3 26.5 - 33.0 pg    MCHC 35.1 31.5 - 36.5 g/dL    RDW 13.3 10.0 - 15.0 %    Platelet Count 215 150 - 450 10e9/L    Diff Method  Automated Method     % Neutrophils 58.0 %    % Lymphocytes 32.2 %    % Monocytes 7.8 %    % Eosinophils 1.0 %    % Basophils 0.5 %    % Immature Granulocytes 0.5 %    Nucleated RBCs 0 0 /100    Absolute Neutrophil 4.8 1.6 - 8.3 10e9/L    Absolute Lymphocytes 2.7 0.8 - 5.3 10e9/L    Absolute Monocytes 0.7 0.0 - 1.3 10e9/L    Absolute Eosinophils 0.1 0.0 - 0.7 10e9/L    Absolute Basophils 0.0 0.0 - 0.2 10e9/L    Abs Immature Granulocytes 0.0 0 - 0.4 10e9/L    Absolute Nucleated RBC 0.0    Comprehensive metabolic panel   Result Value Ref Range    Sodium 141 133 - 144 mmol/L    Potassium 3.7 3.4 - 5.3 mmol/L    Chloride 105 94 - 109 mmol/L    Carbon Dioxide 26 20 - 32 mmol/L    Anion Gap 10 3 - 14 mmol/L    Glucose 102 (H) 70 - 99 mg/dL    Urea Nitrogen 15 7 - 30 mg/dL    Creatinine 0.85 0.66 - 1.25 mg/dL    GFR Estimate >90 >60 mL/min/1.7m2    GFR Estimate If Black >90 >60 mL/min/1.7m2    Calcium 8.5 8.5 - 10.1 mg/dL    Bilirubin Total 0.6 0.2 - 1.3 mg/dL    Albumin 3.5 3.4 - 5.0 g/dL    Protein Total 7.0 6.8 - 8.8 g/dL    Alkaline Phosphatase 86 40 - 150 U/L    ALT 35 0 - 70 U/L    AST 30 0 - 45 U/L   Lipid Profile   Result Value Ref Range    Cholesterol 134 <200 mg/dL    Triglycerides 112 <150 mg/dL    HDL Cholesterol 63 >39 mg/dL    LDL Cholesterol Calculated 49 <100 mg/dL    Non HDL Cholesterol 71 <130 mg/dL   PSA tumor marker   Result Value Ref Range    PSA 0.72 0 - 4 ug/L   Hemoglobin A1c   Result Value Ref Range    Hemoglobin A1C 6.2 0 - 6.4 %   Estimated Average Glucose   Result Value Ref Range    Estimated Average Glucose 131 mg/dL   XR Chest 2 Views    Narrative    PROCEDURE:  XR CHEST 2 VW    HISTORY:  ; Coronary artery disease involving native coronary artery  of native heart without angina pectoris; Essential hypertension;  Tobacco abuse, in remission.     COMPARISON:  None.    FINDINGS:   The cardiac silhouette is normal in size. The pulmonary vasculature is  normal.  The lungs are clear. No pleural  effusion or pneumothorax.      Impression    IMPRESSION:  No acute cardiopulmonary disease.      REBECCA HUSAIN MD

## 2018-05-02 ASSESSMENT — PATIENT HEALTH QUESTIONNAIRE - PHQ9: SUM OF ALL RESPONSES TO PHQ QUESTIONS 1-9: 5

## 2018-05-02 ASSESSMENT — ANXIETY QUESTIONNAIRES: GAD7 TOTAL SCORE: 3

## 2018-05-21 ENCOUNTER — OFFICE VISIT (OUTPATIENT)
Dept: SURGERY | Facility: OTHER | Age: 63
End: 2018-05-21
Attending: FAMILY MEDICINE
Payer: COMMERCIAL

## 2018-05-21 ENCOUNTER — RADIANT APPOINTMENT (OUTPATIENT)
Dept: GENERAL RADIOLOGY | Facility: OTHER | Age: 63
End: 2018-05-21
Attending: FAMILY MEDICINE
Payer: COMMERCIAL

## 2018-05-21 ENCOUNTER — OFFICE VISIT (OUTPATIENT)
Dept: FAMILY MEDICINE | Facility: OTHER | Age: 63
End: 2018-05-21
Attending: FAMILY MEDICINE
Payer: COMMERCIAL

## 2018-05-21 ENCOUNTER — TELEPHONE (OUTPATIENT)
Dept: FAMILY MEDICINE | Facility: OTHER | Age: 63
End: 2018-05-21

## 2018-05-21 VITALS
HEART RATE: 84 BPM | BODY MASS INDEX: 28.14 KG/M2 | DIASTOLIC BLOOD PRESSURE: 76 MMHG | WEIGHT: 190 LBS | HEIGHT: 69 IN | TEMPERATURE: 98 F | OXYGEN SATURATION: 96 % | SYSTOLIC BLOOD PRESSURE: 138 MMHG

## 2018-05-21 VITALS
OXYGEN SATURATION: 97 % | TEMPERATURE: 98.6 F | BODY MASS INDEX: 28.14 KG/M2 | SYSTOLIC BLOOD PRESSURE: 112 MMHG | HEART RATE: 70 BPM | WEIGHT: 190 LBS | HEIGHT: 69 IN | DIASTOLIC BLOOD PRESSURE: 70 MMHG

## 2018-05-21 DIAGNOSIS — R07.81 RIB PAIN ON LEFT SIDE: Primary | ICD-10-CM

## 2018-05-21 DIAGNOSIS — K40.20 NON-RECURRENT BILATERAL INGUINAL HERNIA WITHOUT OBSTRUCTION OR GANGRENE: ICD-10-CM

## 2018-05-21 DIAGNOSIS — R07.81 RIB PAIN ON LEFT SIDE: ICD-10-CM

## 2018-05-21 PROCEDURE — 99213 OFFICE O/P EST LOW 20 MIN: CPT | Performed by: SURGERY

## 2018-05-21 PROCEDURE — 99213 OFFICE O/P EST LOW 20 MIN: CPT | Performed by: FAMILY MEDICINE

## 2018-05-21 PROCEDURE — 71101 X-RAY EXAM UNILAT RIBS/CHEST: CPT | Mod: TC

## 2018-05-21 ASSESSMENT — PAIN SCALES - GENERAL
PAINLEVEL: MILD PAIN (3)
PAINLEVEL: MILD PAIN (3)

## 2018-05-21 ASSESSMENT — ANXIETY QUESTIONNAIRES
3. WORRYING TOO MUCH ABOUT DIFFERENT THINGS: SEVERAL DAYS
GAD7 TOTAL SCORE: 7
2. NOT BEING ABLE TO STOP OR CONTROL WORRYING: SEVERAL DAYS
7. FEELING AFRAID AS IF SOMETHING AWFUL MIGHT HAPPEN: SEVERAL DAYS
IF YOU CHECKED OFF ANY PROBLEMS ON THIS QUESTIONNAIRE, HOW DIFFICULT HAVE THESE PROBLEMS MADE IT FOR YOU TO DO YOUR WORK, TAKE CARE OF THINGS AT HOME, OR GET ALONG WITH OTHER PEOPLE: SOMEWHAT DIFFICULT
6. BECOMING EASILY ANNOYED OR IRRITABLE: MORE THAN HALF THE DAYS
4. TROUBLE RELAXING: SEVERAL DAYS
1. FEELING NERVOUS, ANXIOUS, OR ON EDGE: SEVERAL DAYS
5. BEING SO RESTLESS THAT IT IS HARD TO SIT STILL: NOT AT ALL

## 2018-05-21 NOTE — TELEPHONE ENCOUNTER
8:06 AM    Reason for Call: Phone Call    Description: Patient has an appt today at 930 with Dr. Tomlin but would like to know if he could get an appt with Dr. Huizar he believes he pooped a rib out.     Was an appointment offered for this call? No  If yes : Appointment type              Date    Preferred method for responding to this message: Telephone Call  What is your phone number ?    If we cannot reach you directly, may we leave a detailed response at the number you provided? Yes    Can this message wait until your PCP/provider returns, if available today? Not applicable,     Radha Meadows

## 2018-05-21 NOTE — NURSING NOTE
"Chief Complaint   Patient presents with     Consult     bilateral inguinal hernia referred by Dr. Huizar       Initial /76 (BP Location: Right arm, Patient Position: Sitting, Cuff Size: Adult Large)  Pulse 84  Temp 98  F (36.7  C) (Tympanic)  Ht 5' 8.75\" (1.746 m)  Wt 190 lb (86.2 kg)  SpO2 96%  BMI 28.26 kg/m2 Estimated body mass index is 28.26 kg/(m^2) as calculated from the following:    Height as of this encounter: 5' 8.75\" (1.746 m).    Weight as of this encounter: 190 lb (86.2 kg).  Medication Reconciliation: complete    Elana Rudolph LPN    "

## 2018-05-21 NOTE — NURSING NOTE
"Chief Complaint   Patient presents with     Pain       Initial /70  Pulse 70  Temp 98.6  F (37  C)  Ht 5' 8.75\" (1.746 m)  Wt 190 lb (86.2 kg)  SpO2 97%  BMI 28.26 kg/m2 Estimated body mass index is 28.26 kg/(m^2) as calculated from the following:    Height as of this encounter: 5' 8.75\" (1.746 m).    Weight as of this encounter: 190 lb (86.2 kg).  Medication Reconciliation: complete    Calvin Torre LPN    "

## 2018-05-21 NOTE — PATIENT INSTRUCTIONS
Thank you for allowing Dr. Tomlin and our surgical team to participate in your care.  If you have a scheduling or an appointment question please contact [facility] Unit Coordinator at her direct line [phone].   ALL nursing questions or concerns can be directed to Tahmina at: [phone]     You are scheduled for a: bilateral inguinal hernia repair  Your procedure date is: 7/18/18  Your post-op appointment is scheduled on: 7/31/18 at 8:45    HOW TO PREPARE-      You need to have a scheduled Pre-Op with your primary care physician within 30 days of your scheduled surgery. This is already scheduled with Dr. Huizar on 7/13/18 at 8:45      You need a friend or family member available to drive you home AND stay with you for 24 hours after you leave the hospital. You will not be allowed to drive yourself. IF you need to take a taxi or the bus you MUST have a responsible person to ride with you. YOUR PROCEDURE WILL BE CANCELLED IF YOU DO NOT HAVE A RESPONSIBLE ADULT TO DRIVE YOU HOME.       You CANNOT have anything to eat or drink after midnight the night before your surgery, ncluding water and coffee. Your stomach needs to be completely empty. Do NOT chew gum, suck on hard candy, or smoke. You can brush your teeth the morning of surgery.       You need to call our Surgery Education Nurses 1-2 weeks prior to your surgery date at  [phone] or toll free [phone]. Please have you medication and allergy lists ready.      Stop your aspirin or other NSAIDs(Ibuprofen, Motrin, Aleve, Celebrex, Naproxen, etc...) 7 days before your surgery.      Hospital admitting will call you the day before your surgery with your arrival time. If you are scheduled on a Monday admitting will call you the Friday before.      Please call your primary care physician if you should become ill within 24 hours of scheduled surgery. (ex.vomiting, diarrhea, fever)          You will need to wash the night before AND the morning of you  procedure with the supplied Hibiclens. Wash your Surgical area with your bare hands, apply friction and rinse. KEEP IT AWAY FROM YOUR EYES, EARS, NOSE AND MOUTH. \    Bump down your 325 aspirin to an 81 mg for a week prior to your procedure.     Day of surgery take your metoprolol only, hold all other medications until you arrive home at which time you may resume them like normal.

## 2018-05-21 NOTE — PROGRESS NOTES
SUBJECTIVE:   Anthony Steele is a 62 year old male who presents to clinic today for the following health issues:      Musculoskeletal problem/pain      Duration: last Tuesday    Description  Location: left chest    Intensity:  moderate    Accompanying signs and symptoms: can't lift arm above head    History  Previous similar problem: no   Previous evaluation:  none    Precipitating or alleviating factors:  Trauma or overuse: no   Aggravating factors include: lifting and exercise    Therapies tried and outcome: nothing      A week ago - was lying on dock on his back and reaching with his left arm to pull something closer to the dock. Felt a pop. Thought it was muscular but has been worsening since Thursday. Feels pain along left fourth rib that is tender to deep pressure. Pleuritic pain. Better holding arm close to side. No ice, heat or ibu. No trauma.       Problem list and histories reviewed & adjusted, as indicated.  Additional history: as documented    Patient Active Problem List   Diagnosis     Prediabetes     H/O ETOH abuse     Displacement of cervical intervertebral disc without myelopathy(aka HERNIATED DISK)     Mixed hyperlipidemia     Personal history of tobacco use, presenting hazards to health     Advanced care planning/counseling discussion     Colon polyps     Coronary artery disease involving native coronary artery without angina pectoris     Essential hypertension     Gastroesophageal reflux disease without esophagitis     Tobacco abuse, in remission     Nocturia     Adjustment disorder with depressed mood     Non-recurrent bilateral inguinal hernia without obstruction or gangrene     Primary osteoarthritis involving multiple joints     Past Surgical History:   Procedure Laterality Date     ANGIOPLASTY  [id number]    Failed angioplasty of OM vessel     ARTHROSCOPY KNEE      RT     BACK SURGERY      C5-6 fusion     Carpal Tunnel Syndrome  2003    Resolved from Problem List  2012     COLONOSCOPY  2008     colonoscopy with polypectomy/ Repeat in 2011     COLONOSCOPY  3/24/2014    Repeat in 2019//Procedure: COLONOSCOPY;  COLONOSCOPY;  Surgeon: Jessica Washington MD;  Location: [facility]     RELEASE CARPAL TUNNEL      RT     STENT  07/2011    Myocardial Infarction; Stent placement [facility]     STENT, CORONARY, LIZZIE  2012       Social History   Substance Use Topics     Smoking status: Former Smoker     Packs/day: 1.00     Years: 20.00     Smokeless tobacco: Never Used      Comment: Tried to quit: yes; longest period tobacco-free- 10 years     Alcohol use Yes      Comment: Occasionally     Family History   Problem Relation Age of Onset     CEREBROVASCULAR DISEASE Father 48     Cause of death     DIABETES Mother      Type 2     HEART DISEASE Mother      Heart valve replacements     Hypertension Mother      Arthritis Sister          Current Outpatient Prescriptions   Medication Sig Dispense Refill     aspirin 325 MG tablet Take 1 tablet (325 mg) by mouth daily 180 tablet      atorvastatin (LIPITOR) 40 MG tablet TAKE 1 TABLET DAILY BY MOUTH - GENERIC FOR LIPITOR 30 tablet 0     buPROPion (WELLBUTRIN XL) 150 MG 24 hr tablet Take 1 tablet (150 mg) by mouth every morning 30 tablet 1     EPINEPHrine (EPIPEN/ADRENACLICK/OR ANY BX GENERIC EQUIV) 0.3 MG/0.3ML injection 2-pack Inject 0.3 mLs (0.3 mg) into the muscle as needed for anaphylaxis (Patient not taking: Reported on 5/21/2018) 0.6 mL 3     lisinopril (PRINIVIL/ZESTRIL) 10 MG tablet TAKE 1 TABLET BY MOUTH DAILY- GENERIC FOR ZESTRIL 90 tablet 3     metoprolol tartrate (LOPRESSOR) 25 MG tablet TAKE 1 TABLET BY MOUTH TWICE DAILY 180 tablet 1     nitroglycerin (NITROSTAT) 0.4 MG sublingual tablet PLACE 1 TABLET UNDER THE TONGUE EVERY FIVE MINUTES AS NEEDED FOR CHEST PAIN. DO NOT CRUSH; MAXIMUM OF 3 DOSES IN 15 MINUTES. (Patient not taking: Reported on 5/21/2018) 25 tablet 5     Omega-3 Fatty Acids (OMEGA-3 FISH OIL PO) Take 1 g by mouth       omeprazole (PRILOSEC) 40 MG capsule  "TAKE 1 CAPSULE BY MOUTH ONCE DAILY. TAKE 30 TO 60 MINUTES BEFORE A MEAL. 90 capsule 1       Reviewed and updated as needed this visit by clinical staff       Reviewed and updated as needed this visit by Provider         ROS:  CONSTITUTIONAL: NEGATIVE for fever, chills  RESP: NEGATIVE for significant cough or SOB  CV: NEGATIVE for chest pain, palpitations or peripheral edema  MUSCULOSKELETAL: POSITIVE for left rib pain    OBJECTIVE:                                                    /70  Pulse 70  Temp 98.6  F (37  C)  Ht 5' 8.75\" (1.746 m)  Wt 190 lb (86.2 kg)  SpO2 97%  BMI 28.26 kg/m2  Body mass index is 28.26 kg/(m^2).     GENERAL: healthy, alert, well nourished, well hydrated, no distress  RESP: lungs clear to auscultation - no rales, no rhonchi, no wheezes  CV: regular rates and rhythm, normal S1 S2, no S3 or S4 and no murmur, no click or rub -  MS: tender along left fourth rib line, especially near axillary region. Pain with use of pectoralis muscle.      XR negative.      ASSESSMENT/PLAN:                                                    (R07.81) Rib pain on left side  (primary encounter diagnosis)  Comment: XR negative for fracture. Likely strained or ruptured left pectoralis muscle. Discussed supportive care with ibuprofen and rest. Work note given. Follow up in 7 days.   Plan: XR Ribs & Chest Lt 3v       No work since incident 5/15 and till reassess on 5/29. Discussed in length conservative measures of OTC medications for pain, Ice/Heat, elevation and the concept of R.I.C.E.. Continue behavioral changes and proper body mechanics to allow for healing. Follow up as directed.   Symptomatic treatment was discussed along when patient should call and/or come back into the clinic or go to ER/Urgent care. All questions answered.              Abdulkadir Huizar MD  [facility] [place]  "

## 2018-05-21 NOTE — PROGRESS NOTES
"Surgery Consult Clinic Note      RE: Anthony Steele  : 1955    Chief Complaint:  Bilateral inguinal hernia    History of Present Illness:  I originally saw Mr. Steele on 18 for groin pain in the setting of bilateral inguinal hernias.  Please refer to that note for further details.  He cancelled that surgery after he fell and broke his ankle.  He comes today to reschedule the surgery.  C/O right groin pain on activity, bloating and constipation.  He also mentions injuring his left shoulder this weekend while working on the dock and \"pulled something\".  This is causing him pain with breathing, coughing.         Medical history:  Past Medical History:   Diagnosis Date     Acute myocardial infarction, unspecified site, episode of care unspecified 2011     Adjustment disorder with mixed anxiety and depressed mood 2012     CAD (coronary artery disease) 3/19/2013     Colon polyps      Coronary atherosclerosis of unspecified type of vessel, native or graft 2012     Displacement of cervical intervertebral disc without myelopathy      GERD (gastroesophageal reflux disease)      H/O ETOH abuse      HTN (hypertension)      Mixed hyperlipidemia 2011     Personal history of tobacco use, presenting hazards to health 2011    Tobacco abuse, in remission     Postsurgical percutaneous transluminal coronary angioplasty status 2011     Prediabetes      Status post coronary angiogram 3/19/2013     Tobacco abuse, in remission        Surgical history:  Past Surgical History:   Procedure Laterality Date     ANGIOPLASTY  [id number]    Failed angioplasty of OM vessel     ARTHROSCOPY KNEE      RT     BACK SURGERY      C5-6 fusion     Carpal Tunnel Syndrome      Resolved from Problem List       COLONOSCOPY      colonoscopy with polypectomy/ Repeat in      COLONOSCOPY  3/24/2014    Repeat in //Procedure: COLONOSCOPY;  COLONOSCOPY;  Surgeon: Jessica Washington MD;  Location: [facility] " OR     RELEASE CARPAL TUNNEL      RT     STENT  07/2011    Myocardial Infarction; Stent placement [facility]     STENT, CORONARY, LIZZIE  2012       Family history:  Family History   Problem Relation Age of Onset     CEREBROVASCULAR DISEASE Father 48     Cause of death     DIABETES Mother      Type 2     HEART DISEASE Mother      Heart valve replacements     Hypertension Mother      Arthritis Sister        Medications:  Prior to Admission medications    Medication Sig Start Date End Date Taking? Authorizing Provider   aspirin 325 MG tablet Take 1 tablet (325 mg) by mouth daily 12/17/15  Yes Abdulkadir Huizar MD   atorvastatin (LIPITOR) 40 MG tablet TAKE 1 TABLET DAILY BY MOUTH - GENERIC FOR LIPITOR 4/23/18  Yes Abdulkadir Huizar MD   buPROPion (WELLBUTRIN XL) 150 MG 24 hr tablet Take 1 tablet (150 mg) by mouth every morning 5/1/18  Yes Abdulkadir Huizar MD   lisinopril (PRINIVIL/ZESTRIL) 10 MG tablet TAKE 1 TABLET BY MOUTH DAILY- GENERIC FOR ZESTRIL 5/1/18  Yes Abdulkadir Huizar MD   metoprolol tartrate (LOPRESSOR) 25 MG tablet TAKE 1 TABLET BY MOUTH TWICE DAILY 3/12/18  Yes Abdulkadir Huizar MD   Omega-3 Fatty Acids (OMEGA-3 FISH OIL PO) Take 1 g by mouth   Yes Reported, Patient   omeprazole (PRILOSEC) 40 MG capsule TAKE 1 CAPSULE BY MOUTH ONCE DAILY. TAKE 30 TO 60 MINUTES BEFORE A MEAL. 3/12/18  Yes Abdulkadir Huizar MD   EPINEPHrine (EPIPEN/ADRENACLICK/OR ANY BX GENERIC EQUIV) 0.3 MG/0.3ML injection 2-pack Inject 0.3 mLs (0.3 mg) into the muscle as needed for anaphylaxis  Patient not taking: Reported on 5/21/2018 5/1/18   Abdulkadir Huizar MD   nitroglycerin (NITROSTAT) 0.4 MG sublingual tablet PLACE 1 TABLET UNDER THE TONGUE EVERY FIVE MINUTES AS NEEDED FOR CHEST PAIN. DO NOT CRUSH; MAXIMUM OF 3 DOSES IN 15 MINUTES.  Patient not taking: Reported on 5/21/2018 3/6/17   Abdulkadir Huizar MD       Allergies:  The patientis allergic to codeine and shellfish-derived products.  .  Social history:  Social History   Substance Use  "Topics     Smoking status: Former Smoker     Packs/day: 1.00     Years: 20.00     Smokeless tobacco: Never Used      Comment: Tried to quit: yes; longest period tobacco-free- 10 years     Alcohol use Yes      Comment: Occasionally     Marital status: .    Review of Systems:    Constitutional: Negative for fever, chills and weight loss.   HENT: Negative for ear pain, nosebleeds, congestion, sore throat, tinnitus and ear discharge.    Eyes: Negative for blurred vision, double vision, photophobia and pain.   Respiratory: Negative for cough, hemoptysis, shortness of breath, wheezing and stridor.    Cardiovascular: Negative for chest pain, palpitations and orthopnea.   Gastrointestinal: Per HPI  Genitourinary: Negative for urgency, frequency and hematuria.   Musculoskeletal: Per HPI  Neurological: Negative for tingling, speech change and headaches.   Endo/Heme/Allergies: Does not bruise/bleed easily.   Psychiatric/Behavioral: Negative for depression, suicidal ideas and hallucinations. The patient is not nervous/anxious.    Physical Examination:  /76 (BP Location: Right arm, Patient Position: Sitting, Cuff Size: Adult Large)  Pulse 84  Temp 98  F (36.7  C) (Tympanic)  Ht 1.746 m (5' 8.75\")  Wt 86.2 kg (190 lb)  SpO2 96%  BMI 28.26 kg/m2  General: AAOx4, NAD, WN/WD, ambulating without assistance  HEENT:NCAT, EOMI, PERRL Sclerae anicteric; Trachea mideline, no JVD  Chest:   Clear to auscultation bilaterally.  Cardiac: S1S2 , regular rate and rhythm without additional sounds  Abdomen: Soft, ND/NT no rebound, no guarding, small spontaneously reducing mass right and left groin  Extremities: Cursory exam unremarkable.  Skin: Warm, dry, < 2 sec cap refill  Neuro: CN 2-12 grossly intact, no focal deficit, GCS 15  Psych: happy, calm, asks appropriate questions    # Pain Assessment:  Current Pain Score 4/26/2018   Pain score (0-10) 0   - Anthony is experiencing pain due to left should pain. Pain management was " discussed and the plan was created in a collaborative fashion.  Anthony's response to the current recommendations: engaged  - Seeing Dr. Huizar for evaluation          Assessment/Plan:  #1 Bilateral inguinal hernias  #2 CAD  #3 HTN  #4 Musculoskeletal injury    Thank you for the consult.  Mr. Steele and I had a long and rosalina discussion about the pathophysiology of hernias and their progression from asymptomatic, to symptomatic to incarceration and strangulation.  The patient has been educated on the signs and symptoms of incarceration and strangulation and instructed to seek medical attention immediately.  The risk, benefits and alternatives have been fully discussed.  These include, but aren't limited to, the risk of bleeding, infection, recurrence, injury to adjacent structures and chronic pain. He'd like to put off until after his trip to [place] where he'll be fishing and hiking for the trip and does not want to be on restrictions for this trip.  I don't believe the bloating and constipation are attributed to the hernias, this elective repair I think appropriate. We'll repair Laparoscopic TEP with general anesthesia.          Dr Tomlin  [facility]  [address] [place] [address], [address]  [place]    [address]    Referring Provider:  Abdulkadir Huizar MD  [address] [place] [id number]     Primary Care Provider:  Abdulkadir Huizar

## 2018-05-21 NOTE — MR AVS SNAPSHOT
After Visit Summary   5/21/2018    Anthony Steele    MRN: [id number]           Patient Information     Date Of Birth          1955        Visit Information        Provider Department      5/21/2018 9:45 AM Louis Tomlin, DO [facility]        Today's Diagnoses     Non-recurrent bilateral inguinal hernia without obstruction or gangrene          Care Instructions    Thank you for allowing Dr. Tomlin and our surgical team to participate in your care.  If you have a scheduling or an appointment question please contact [facility] Unit Coordinator at her direct line [phone].   ALL nursing questions or concerns can be directed to Tahmina at: [phone]     You are scheduled for a: bilateral inguinal hernia repair  Your procedure date is: 7/18/18  Your post-op appointment is scheduled on: 7/31/18 at 8:45    HOW TO PREPARE-      You need to have a scheduled Pre-Op with your primary care physician within 30 days of your scheduled surgery. This is already scheduled with Dr. Huizar on 7/13/18 at 8:45      You need a friend or family member available to drive you home AND stay with you for 24 hours after you leave the hospital. You will not be allowed to drive yourself. IF you need to take a taxi or the bus you MUST have a responsible person to ride with you. YOUR PROCEDURE WILL BE CANCELLED IF YOU DO NOT HAVE A RESPONSIBLE ADULT TO DRIVE YOU HOME.       You CANNOT have anything to eat or drink after midnight the night before your surgery, ncluding water and coffee. Your stomach needs to be completely empty. Do NOT chew gum, suck on hard candy, or smoke. You can brush your teeth the morning of surgery.       You need to call our Surgery Education Nurses 1-2 weeks prior to your surgery date at  [phone] or toll free [phone]. Please have you medication and allergy lists ready.      Stop your aspirin or other NSAIDs(Ibuprofen, Motrin, Aleve, Celebrex, Naproxen, etc...)  7 days before your surgery.      Hospital admitting will call you the day before your surgery with your arrival time. If you are scheduled on a Monday admitting will call you the Friday before.      Please call your primary care physician if you should become ill within 24 hours of scheduled surgery. (ex.vomiting, diarrhea, fever)          You will need to wash the night before AND the morning of you procedure with the supplied Hibiclens. Wash your Surgical area with your bare hands, apply friction and rinse. KEEP IT AWAY FROM YOUR EYES, EARS, NOSE AND MOUTH. \    Bump down your 325 aspirin to an 81 mg for a week prior to your procedure.     Day of surgery take your metoprolol only, hold all other medications until you arrive home at which time you may resume them like normal.             Follow-ups after your visit        Your next 10 appointments already scheduled     May 21, 2018 10:40 AM CDT   (Arrive by 10:25 AM)   SHORT with MD Cruzito Weber ([facility] [place] )    [address]  [place] [address]   [phone]            May 29, 2018  4:00 PM CDT   (Arrive by 3:45 PM)   SHORT with MD Cruzito Weberbing ([facility] [place] )    [address]  [place] [address]   [phone]            Jul 13, 2018  9:00 AM CDT   (Arrive by 8:45 AM)   Pre-Op physical with MD Cruzito Weberbing ([facility] [place] )    [address]  [place] [address]   [phone]            Jul 31, 2018  9:00 AM CDT   (Arrive by 8:45 AM)   Post Op with DO Cruzito De La Pazbing ([facility] )    [address]  [place] [address]   [phone]              Who to contact     If you have questions or need follow up information about today's clinic visit or your schedule please contact [facility] directly at [phone].  Normal or non-critical lab and  "imaging results will be communicated to you by MyChart, letter or phone within 4 business days after the clinic has received the results. If you do not hear from us within 7 days, please contact the clinic through [employer] or phone. If you have a critical or abnormal lab result, we will notify you by phone as soon as possible.  Submit refill requests through [employer] or call your pharmacy and they will forward the refill request to us. Please allow 3 business days for your refill to be completed.          Additional Information About Your Visit        [employer] Information     [employer] lets you send messages to your doctor, view your test results, renew your prescriptions, schedule appointments and more. To sign up, go to [email or web address] . Click on \"Log in\" on the left side of the screen, which will take you to the Welcome page. Then click on \"Sign up Now\" on the right side of the page.     You will be asked to enter the access code listed below, as well as some personal information. Please follow the directions to create your username and password.     Your access code is: [email or web address]  Expires: 2018  4:49 PM     Your access code will  in 90 days. If you need help or a new code, please call your [facility] clinic or [phone].        Care EveryWhere ID     This is your Care EveryWhere ID. This could be used by other organizations to access your [facility] medical records  [id number]        Your Vitals Were     Pulse Temperature Height Pulse Oximetry BMI (Body Mass Index)       84 98  F (36.7  C) (Tympanic) 5' 8.75\" (1.746 m) 96% 28.26 kg/m2        Blood Pressure from Last 3 Encounters:   18 138/76   18 110/66   18 133/81    Weight from Last 3 Encounters:   18 190 lb (86.2 kg)   18 192 lb (87.1 kg)   18 190 lb (86.2 kg)              Today, you had the following     No orders found for display       Primary Care Provider Office Phone # Fax #    Abdulkadir Huizar MD " [phone] [address] [facility] [address]        Equal Access to Services     VALERIE CROWE : Hadii aad ku hadvanessa Bowser, wajoyda lutiara, qarashadta kaabdulkadirda minda, devendra anain hayaadamian benjamincheli amador moon tolentino. So [facility] [phone].    ATENCIÓN: Si habla español, tiene a arita disposición servicios gratuitos de asistencia lingüística. Jeferson al [phone].    We comply with applicable federal civil rights laws and [place] laws. We do not discriminate on the basis of race, color, national origin, age, disability, sex, sexual orientation, or gender identity.            Thank you!     Thank you for choosing [facility]  for your care. Our goal is always to provide you with excellent care. Hearing back from our patients is one way we can continue to improve our services. Please take a few minutes to complete the written survey that you may receive in the mail after your visit with us. Thank you!             Your Updated Medication List - Protect others around you: Learn how to safely use, store and throw away your medicines at www.disposemymeds.org.          This list is accurate as of 5/21/18 10:10 AM.  Always use your most recent med list.                   Brand Name Dispense Instructions for use Diagnosis    aspirin 325 MG tablet     180 tablet    Take 1 tablet (325 mg) by mouth daily    Coronary artery disease involving native coronary artery of native heart without angina pectoris       atorvastatin 40 MG tablet    LIPITOR    30 tablet    TAKE 1 TABLET DAILY BY MOUTH - GENERIC FOR LIPITOR    Mixed hyperlipidemia       buPROPion 150 MG 24 hr tablet    WELLBUTRIN XL    30 tablet    Take 1 tablet (150 mg) by mouth every morning    Adjustment disorder with depressed mood       EPINEPHrine 0.3 MG/0.3ML injection 2-pack    EPIPEN/ADRENACLICK/or ANY BX GENERIC EQUIV    0.6 mL    Inject 0.3 mLs (0.3 mg) into the muscle as needed for anaphylaxis    Shellfish allergy       lisinopril  10 MG tablet    PRINIVIL/ZESTRIL    90 tablet    TAKE 1 TABLET BY MOUTH DAILY- GENERIC FOR ZESTRIL    Mixed hyperlipidemia       metoprolol tartrate 25 MG tablet    LOPRESSOR    180 tablet    TAKE 1 TABLET BY MOUTH TWICE DAILY    Essential hypertension with goal blood pressure less than 140/90       nitroGLYcerin 0.4 MG sublingual tablet    NITROSTAT    25 tablet    PLACE 1 TABLET UNDER THE TONGUE EVERY FIVE MINUTES AS NEEDED FOR CHEST PAIN. DO NOT CRUSH; MAXIMUM OF 3 DOSES IN 15 MINUTES.    Coronary artery disease involving native coronary artery of native heart without angina pectoris       OMEGA-3 FISH OIL PO      Take 1 g by mouth        omeprazole 40 MG capsule    priLOSEC    90 capsule    TAKE 1 CAPSULE BY MOUTH ONCE DAILY. TAKE 30 TO 60 MINUTES BEFORE A MEAL.    Gastroesophageal reflux disease without esophagitis

## 2018-05-21 NOTE — MR AVS SNAPSHOT
After Visit Summary   5/21/2018    Anthony Steele    MRN: [id number]           Patient Information     Date Of Birth          1955        Visit Information        Provider Department      5/21/2018 10:40 AM Abdulkadir Huizar MD [facility]        Today's Diagnoses     Rib pain on left side    -  1       Follow-ups after your visit        Your next 10 appointments already scheduled     May 29, 2018  4:00 PM CDT   (Arrive by 3:45 PM)   SHORT with Abdulkadir Huizar MD   [facility] )    [address]  [place] [address]   [phone]            Jul 13, 2018  9:00 AM CDT   (Arrive by 8:45 AM)   Pre-Op physical with Abdulkadir Huizar MD   [facility] ([facility] )    [address]  [place] [address]   [phone]            Jul 18, 2018   Procedure with Louis Tomlin DO   [facility] ([facility] )    [address] [place] [address]  [place] [address] [phone]            Jul 31, 2018  9:00 AM CDT   (Arrive by 8:45 AM)   Post Op with Louis Tomlin DO   [facility] ([facility] )    [address]  [place] [address]   [phone]              Who to contact     If you have questions or need follow up information about today's clinic visit or your schedule please contact [facility] directly at [phone].  Normal or non-critical lab and imaging results will be communicated to you by MyChart, letter or phone within 4 business days after the clinic has received the results. If you do not hear from us within 7 days, please contact the clinic through [employer] or phone. If you have a critical or abnormal lab result, we will notify you by phone as soon as possible.  Submit refill requests through [employer] or call your pharmacy and they will forward the refill request to us. Please allow 3 business days for your refill to be completed.  "         Additional Information About Your Visit        MyChart Information     [employer] lets you send messages to your doctor, view your test results, renew your prescriptions, schedule appointments and more. To sign up, go to [email or web address] . Click on \"Log in\" on the left side of the screen, which will take you to the Welcome page. Then click on \"Sign up Now\" on the right side of the page.     You will be asked to enter the access code listed below, as well as some personal information. Please follow the directions to create your username and password.     Your access code is: [email or web address]  Expires: 2018  4:49 PM     Your access code will  in 90 days. If you need help or a new code, please call your [place] clinic or [phone].        Care EveryWhere ID     This is your Care EveryWhere ID. This could be used by other organizations to access your [place] medical records  [id number]        Your Vitals Were     Pulse Temperature Height Pulse Oximetry BMI (Body Mass Index)       70 98.6  F (37  C) 5' 8.75\" (1.746 m) 97% 28.26 kg/m2        Blood Pressure from Last 3 Encounters:   18 112/70   18 138/76   18 110/66    Weight from Last 3 Encounters:   18 190 lb (86.2 kg)   18 190 lb (86.2 kg)   18 192 lb (87.1 kg)               Primary Care Provider Office Phone # Fax #    Abdulkadir Huizar [phone]       [facility] [place] [address]        Equal Access to Services     Herrick CampusFAUSTINO AH: Hadfreddy Bowser, denise hardin, qadevendra rivero. So [facility] [phone].    ATENCIÓN: Si habla español, tiene a arita disposición servicios gratuitos de asistencia lingüística. Jeferson al [phone].    We comply with applicable federal civil rights laws and [place] laws. We do not discriminate on the basis of race, color, national origin, age, disability, sex, sexual orientation, or gender " identity.            Thank you!     Thank you for choosing [facility]  for your care. Our goal is always to provide you with excellent care. Hearing back from our patients is one way we can continue to improve our services. Please take a few minutes to complete the written survey that you may receive in the mail after your visit with us. Thank you!             Your Updated Medication List - Protect others around you: Learn how to safely use, store and throw away your medicines at www.disposemymeds.org.          This list is accurate as of 5/21/18 12:49 PM.  Always use your most recent med list.                   Brand Name Dispense Instructions for use Diagnosis    aspirin 325 MG tablet     180 tablet    Take 1 tablet (325 mg) by mouth daily    Coronary artery disease involving native coronary artery of native heart without angina pectoris       atorvastatin 40 MG tablet    LIPITOR    30 tablet    TAKE 1 TABLET DAILY BY MOUTH - GENERIC FOR LIPITOR    Mixed hyperlipidemia       buPROPion 150 MG 24 hr tablet    WELLBUTRIN XL    30 tablet    Take 1 tablet (150 mg) by mouth every morning    Adjustment disorder with depressed mood       EPINEPHrine 0.3 MG/0.3ML injection 2-pack    EPIPEN/ADRENACLICK/or ANY BX GENERIC EQUIV    0.6 mL    Inject 0.3 mLs (0.3 mg) into the muscle as needed for anaphylaxis    Shellfish allergy       lisinopril 10 MG tablet    PRINIVIL/ZESTRIL    90 tablet    TAKE 1 TABLET BY MOUTH DAILY- GENERIC FOR ZESTRIL    Mixed hyperlipidemia       metoprolol tartrate 25 MG tablet    LOPRESSOR    180 tablet    TAKE 1 TABLET BY MOUTH TWICE DAILY    Essential hypertension with goal blood pressure less than 140/90       nitroGLYcerin 0.4 MG sublingual tablet    NITROSTAT    25 tablet    PLACE 1 TABLET UNDER THE TONGUE EVERY FIVE MINUTES AS NEEDED FOR CHEST PAIN. DO NOT CRUSH; MAXIMUM OF 3 DOSES IN 15 MINUTES.    Coronary artery disease involving native coronary artery of native heart without  angina pectoris       OMEGA-3 FISH OIL PO      Take 1 g by mouth        omeprazole 40 MG capsule    priLOSEC    90 capsule    TAKE 1 CAPSULE BY MOUTH ONCE DAILY. TAKE 30 TO 60 MINUTES BEFORE A MEAL.    Gastroesophageal reflux disease without esophagitis

## 2018-05-22 ASSESSMENT — PATIENT HEALTH QUESTIONNAIRE - PHQ9: SUM OF ALL RESPONSES TO PHQ QUESTIONS 1-9: 8

## 2018-05-22 ASSESSMENT — ANXIETY QUESTIONNAIRES: GAD7 TOTAL SCORE: 7

## 2018-05-30 ENCOUNTER — TELEPHONE (OUTPATIENT)
Dept: FAMILY MEDICINE | Facility: OTHER | Age: 63
End: 2018-05-30

## 2018-05-30 NOTE — TELEPHONE ENCOUNTER
2:30 PM    Reason for Call: OVERBOOK    Patient is having the following symptoms: adjustment follow up, spaced that he had one on 05/29/18  for 2 days.    The patient is requesting an appointment for ASAP with .    Was an appointment offered for this call? Yes  If yes : Appointment type short               Date 06/06/18, does not want to wait this long     Preferred method for responding to this message: Telephone Call  What is your phone number ?[phone]    If we cannot reach you directly, may we leave a detailed response at the number you provided? Yes    Can this message wait until your PCP/provider returns, if unavailable today? Not applicable, PCP is in     Francisca [place]

## 2018-06-04 ENCOUNTER — OFFICE VISIT (OUTPATIENT)
Dept: FAMILY MEDICINE | Facility: OTHER | Age: 63
End: 2018-06-04
Attending: FAMILY MEDICINE
Payer: COMMERCIAL

## 2018-06-04 VITALS
TEMPERATURE: 97.6 F | DIASTOLIC BLOOD PRESSURE: 62 MMHG | SYSTOLIC BLOOD PRESSURE: 108 MMHG | HEART RATE: 74 BPM | WEIGHT: 185 LBS | OXYGEN SATURATION: 96 % | BODY MASS INDEX: 27.52 KG/M2

## 2018-06-04 DIAGNOSIS — M75.82 PECTORALIS MAJOR TENDINITIS, LEFT: ICD-10-CM

## 2018-06-04 DIAGNOSIS — F43.21 ADJUSTMENT DISORDER WITH DEPRESSED MOOD: Primary | ICD-10-CM

## 2018-06-04 PROCEDURE — 99213 OFFICE O/P EST LOW 20 MIN: CPT | Performed by: FAMILY MEDICINE

## 2018-06-04 RX ORDER — BUPROPION HYDROCHLORIDE 300 MG/1
TABLET ORAL
Qty: 90 TABLET | Refills: 1 | Status: SHIPPED | OUTPATIENT
Start: 2018-06-04 | End: 2018-12-23

## 2018-06-04 RX ORDER — BUPROPION HYDROCHLORIDE 300 MG/1
TABLET ORAL
Refills: 3 | COMMUNITY
Start: 2018-04-20 | End: 2018-06-04

## 2018-06-04 ASSESSMENT — ANXIETY QUESTIONNAIRES
GAD7 TOTAL SCORE: 7
4. TROUBLE RELAXING: SEVERAL DAYS
5. BEING SO RESTLESS THAT IT IS HARD TO SIT STILL: MORE THAN HALF THE DAYS
IF YOU CHECKED OFF ANY PROBLEMS ON THIS QUESTIONNAIRE, HOW DIFFICULT HAVE THESE PROBLEMS MADE IT FOR YOU TO DO YOUR WORK, TAKE CARE OF THINGS AT HOME, OR GET ALONG WITH OTHER PEOPLE: SOMEWHAT DIFFICULT
2. NOT BEING ABLE TO STOP OR CONTROL WORRYING: SEVERAL DAYS
3. WORRYING TOO MUCH ABOUT DIFFERENT THINGS: SEVERAL DAYS
1. FEELING NERVOUS, ANXIOUS, OR ON EDGE: SEVERAL DAYS
6. BECOMING EASILY ANNOYED OR IRRITABLE: SEVERAL DAYS
7. FEELING AFRAID AS IF SOMETHING AWFUL MIGHT HAPPEN: NOT AT ALL

## 2018-06-04 ASSESSMENT — PAIN SCALES - GENERAL: PAINLEVEL: MILD PAIN (2)

## 2018-06-04 NOTE — PROGRESS NOTES
SUBJECTIVE:                                                    Anthony Steele is a 63 year old male who presents to clinic today for the following health issues:      Musculoskeletal problem/pain      Duration: may 15th    Description  Location: left chest    Intensity:  mild    Accompanying signs and symptoms: feeling better. Still has discomfort to cough, deep breaths or rolling over in bed.     History  Previous similar problem: no   Previous evaluation:  x-ray    Precipitating or alleviating factors:  Trauma or overuse: no   Aggravating factors include: coughing, deep breaths and in the morning. To flew the noble to push or pull something.    Therapies tried and outcome: ice and Ibuprofen    Feels MUCH better      Depression and Anxiety Follow-Up    Status since last visit: Worsened so he did start taking the 300 mg since thursday. 150 MG was not working.    Other associated symptoms:None    Complicating factors:     Significant life event: No     Current substance abuse: Alcohol while he was on the 150 mg.    Much better with 300mg - started hat again 4 days ago.   Less agitation and concentration better.   Drinks less etoh on 300mg.     PHQ-9 SCORE 5/1/2018 5/21/2018 6/4/2018   Total Score - - -   Total Score 5 8 9       MACEY-7 SCORE 5/1/2018 5/21/2018 6/4/2018   Total Score 3 7 7     PHQ-9  English  PHQ-9   Any Language  MACEY-7  Suicide Assessment Five-step Evaluation and Treatment (SAFE-T)    Problem list and histories reviewed & adjusted, as indicated.  Additional history: as documented    Patient Active Problem List   Diagnosis     Prediabetes     H/O ETOH abuse     Displacement of cervical intervertebral disc without myelopathy(aka HERNIATED DISK)     Mixed hyperlipidemia     Personal history of tobacco use, presenting hazards to health     Advanced care planning/counseling discussion     Colon polyps     Coronary artery disease involving native coronary artery without angina pectoris     Essential  hypertension     Gastroesophageal reflux disease without esophagitis     Tobacco abuse, in remission     Nocturia     Adjustment disorder with depressed mood     Non-recurrent bilateral inguinal hernia without obstruction or gangrene     Primary osteoarthritis involving multiple joints     Past Surgical History:   Procedure Laterality Date     ANGIOPLASTY  [id number]    Failed angioplasty of OM vessel     ARTHROSCOPY KNEE      RT     BACK SURGERY      C5-6 fusion     Carpal Tunnel Syndrome  2003    Resolved from Problem List  2012     COLONOSCOPY  2008    colonoscopy with polypectomy/ Repeat in 2011     COLONOSCOPY  3/24/2014    Repeat in 2019//Procedure: COLONOSCOPY;  COLONOSCOPY;  Surgeon: Jessica Washington MD;  Location: [facility]     RELEASE CARPAL TUNNEL      RT     STENT  07/2011    Myocardial Infarction; Stent placement [facility]     STENT, CORONARY, LIZZIE  2012       Social History   Substance Use Topics     Smoking status: Former Smoker     Packs/day: 1.00     Years: 20.00     Smokeless tobacco: Never Used      Comment: Tried to quit: yes; longest period tobacco-free- 10 years     Alcohol use Yes      Comment: Occasionally     Family History   Problem Relation Age of Onset     CEREBROVASCULAR DISEASE Father 48     Cause of death     DIABETES Mother      Type 2     HEART DISEASE Mother      Heart valve replacements     Hypertension Mother      Arthritis Sister          Current Outpatient Prescriptions   Medication Sig Dispense Refill     aspirin 325 MG tablet Take 1 tablet (325 mg) by mouth daily 180 tablet      atorvastatin (LIPITOR) 40 MG tablet TAKE 1 TABLET DAILY BY MOUTH - GENERIC FOR LIPITOR 30 tablet 0     buPROPion (WELLBUTRIN XL) 300 MG 24 hr tablet TAKE 1 TABLET DAILY BY MOUTH  3     lisinopril (PRINIVIL/ZESTRIL) 10 MG tablet TAKE 1 TABLET BY MOUTH DAILY- GENERIC FOR ZESTRIL 90 tablet 3     metoprolol tartrate (LOPRESSOR) 25 MG tablet TAKE 1 TABLET BY MOUTH TWICE DAILY 180 tablet 1     nitroglycerin  (NITROSTAT) 0.4 MG sublingual tablet PLACE 1 TABLET UNDER THE TONGUE EVERY FIVE MINUTES AS NEEDED FOR CHEST PAIN. DO NOT CRUSH; MAXIMUM OF 3 DOSES IN 15 MINUTES. 25 tablet 5     Omega-3 Fatty Acids (OMEGA-3 FISH OIL PO) Take 1 g by mouth       omeprazole (PRILOSEC) 40 MG capsule TAKE 1 CAPSULE BY MOUTH ONCE DAILY. TAKE 30 TO 60 MINUTES BEFORE A MEAL. 90 capsule 1     buPROPion (WELLBUTRIN XL) 150 MG 24 hr tablet Take 1 tablet (150 mg) by mouth every morning (Patient not taking: Reported on 6/4/2018) 30 tablet 1     EPINEPHrine (EPIPEN/ADRENACLICK/OR ANY BX GENERIC EQUIV) 0.3 MG/0.3ML injection 2-pack Inject 0.3 mLs (0.3 mg) into the muscle as needed for anaphylaxis (Patient not taking: Reported on 5/21/2018) 0.6 mL 3       ROS:  CONSTITUTIONAL: NEGATIVE for fever, chills, change in weight  MUSCULOSKELETAL: NEGATIVE for swelling or severe pain. POSITIVE for occasional discomfort over pectoralis on left.   PSYCHIATRIC: NEGATIVE for severe anxiety or depression or suicidal ideation.     OBJECTIVE:                                                    /62  Pulse 74  Temp 97.6  F (36.4  C) (Tympanic)  Wt 185 lb (83.9 kg)  SpO2 96%  BMI 27.52 kg/m2  Body mass index is 27.52 kg/(m^2).     GENERAL: healthy, alert, well nourished, well hydrated, no distress  PSYCH: Alert and oriented times 3; speech- coherent , normal rate and volume; able to articulate logical thoughts, able to abstract reason, no tangential thoughts, no hallucinations or delusions, affect- normal       ASSESSMENT/PLAN:                                                    1. Adjustment disorder with depressed mood  Improved concentration and focus with 300mg Buproprion. Tried 150mg but was still have sx and drinking alcohol. 300mg dose works better. 300mg tabs refilled today. Follow up in 6 months.   - buPROPion (WELLBUTRIN XL) 300 MG 24 hr tablet; TAKE 1 TABLET DAILY BY MOUTH  Dispense: 30 tablet; Refill: 3    2. Pectoralis major tendinitis,  left  Doing well and sx much improved. Occasional discomfort but can resume work. FMLA paper work filled out. Date of injury 5/15/18. Resume work 6/6/18.   - Follow up as needed.           Abdulkadir Huizar MD  [facility]

## 2018-06-04 NOTE — MR AVS SNAPSHOT
After Visit Summary   6/4/2018    Anthony Steele    MRN: [id number]           Patient Information     Date Of Birth          1955        Visit Information        Provider Department      6/4/2018 2:00 PM Abdulkadir Huizar MD [facility]        Today's Diagnoses     Adjustment disorder with depressed mood    -  1    Pectoralis major tendinitis, left           Follow-ups after your visit        Your next 10 appointments already scheduled     Jul 13, 2018  9:00 AM CDT   (Arrive by 8:45 AM)   Pre-Op physical with Abdulkadir Huizar MD   [facility]    [address]  [place] [address]   [phone]            Jul 18, 2018   Procedure with Louis Tomlin DO   [facility] ([facility] )    [address] [place] [address]  [place] [id number] [phone]            Jul 31, 2018  9:00 AM CDT   (Arrive by 8:45 AM)   Post Op with Louis Tomlin DO   [facility] [place] )    [address]  [place] [address]   [phone]              Who to contact     If you have questions or need follow up information about today's clinic visit or your schedule please contact [facility] directly at [phone].  Normal or non-critical lab and imaging results will be communicated to you by MyChart, letter or phone within 4 business days after the clinic has received the results. If you do not hear from us within 7 days, please contact the clinic through MyChart or phone. If you have a critical or abnormal lab result, we will notify you by phone as soon as possible.  Submit refill requests through [employer] or call your pharmacy and they will forward the refill request to us. Please allow 3 business days for your refill to be completed.          Additional Information About Your Visit        Care EveryWhere ID     This is your Care EveryWhere ID. This could be used by other organizations  to access your [place] medical records  [id number]        Your Vitals Were     Pulse Temperature Pulse Oximetry BMI (Body Mass Index)          74 97.6  F (36.4  C) (Tympanic) 96% 27.52 kg/m2         Blood Pressure from Last 3 Encounters:   06/04/18 108/62   05/21/18 112/70   05/21/18 138/76    Weight from Last 3 Encounters:   06/04/18 185 lb (83.9 kg)   05/21/18 190 lb (86.2 kg)   05/21/18 190 lb (86.2 kg)              Today, you had the following     No orders found for display         Today's Medication Changes          These changes are accurate as of 6/4/18  2:35 PM.  If you have any questions, ask your nurse or doctor.               These medicines have changed or have updated prescriptions.        Dose/Directions    buPROPion 300 MG 24 hr tablet   Commonly known as:  WELLBUTRIN XL   This may have changed:  Another medication with the same name was removed. Continue taking this medication, and follow the directions you see here.   Used for:  Adjustment disorder with depressed mood        TAKE 1 TABLET DAILY BY MOUTH   Quantity:  90 tablet   Refills:  1            Where to get your medicines      These medications were sent to [id number] - [place], [place] - [address]  [address] [facility] [address]     Phone:  [phone]     buPROPion 300 MG 24 hr tablet                Primary Care Provider Office Phone # Fax #    Abdulkadir Huizar [phone] [address] [facility] [address]        Equal Access to Services     BRENDA CROWE : Hadii stephanie koo hadasho Soomaali, waaxda luqadaha, qaybta kaalmada adeegyasonam esteswoody tammy tolentino. So [facility] [phone].    ATENCIÓN: Si habla español, tiene a arita disposición servicios gratuitos de asistencia lingüística. Llame al [phone].    We comply with applicable federal civil rights laws and [place] laws. We do not discriminate on the basis of race, color, national origin, age, disability, sex, sexual  orientation, or gender identity.            Thank you!     Thank you for choosing [facility]  for your care. Our goal is always to provide you with excellent care. Hearing back from our patients is one way we can continue to improve our services. Please take a few minutes to complete the written survey that you may receive in the mail after your visit with us. Thank you!             Your Updated Medication List - Protect others around you: Learn how to safely use, store and throw away your medicines at www.disposemymeds.org.          This list is accurate as of 6/4/18  2:35 PM.  Always use your most recent med list.                   Brand Name Dispense Instructions for use Diagnosis    aspirin 325 MG tablet     180 tablet    Take 1 tablet (325 mg) by mouth daily    Coronary artery disease involving native coronary artery of native heart without angina pectoris       atorvastatin 40 MG tablet    LIPITOR    30 tablet    TAKE 1 TABLET DAILY BY MOUTH - GENERIC FOR LIPITOR    Mixed hyperlipidemia       buPROPion 300 MG 24 hr tablet    WELLBUTRIN XL    90 tablet    TAKE 1 TABLET DAILY BY MOUTH    Adjustment disorder with depressed mood       EPINEPHrine 0.3 MG/0.3ML injection 2-pack    EPIPEN/ADRENACLICK/or ANY BX GENERIC EQUIV    0.6 mL    Inject 0.3 mLs (0.3 mg) into the muscle as needed for anaphylaxis    Shellfish allergy       lisinopril 10 MG tablet    PRINIVIL/ZESTRIL    90 tablet    TAKE 1 TABLET BY MOUTH DAILY- GENERIC FOR ZESTRIL    Mixed hyperlipidemia       metoprolol tartrate 25 MG tablet    LOPRESSOR    180 tablet    TAKE 1 TABLET BY MOUTH TWICE DAILY    Essential hypertension with goal blood pressure less than 140/90       nitroGLYcerin 0.4 MG sublingual tablet    NITROSTAT    25 tablet    PLACE 1 TABLET UNDER THE TONGUE EVERY FIVE MINUTES AS NEEDED FOR CHEST PAIN. DO NOT CRUSH; MAXIMUM OF 3 DOSES IN 15 MINUTES.    Coronary artery disease involving native coronary artery of native heart  without angina pectoris       OMEGA-3 FISH OIL PO      Take 1 g by mouth        omeprazole 40 MG capsule    priLOSEC    90 capsule    TAKE 1 CAPSULE BY MOUTH ONCE DAILY. TAKE 30 TO 60 MINUTES BEFORE A MEAL.    Gastroesophageal reflux disease without esophagitis

## 2018-06-04 NOTE — NURSING NOTE
"Chief Complaint   Patient presents with     Musculoskeletal Problem       Initial /62  Pulse 74  Temp 97.6  F (36.4  C) (Tympanic)  Wt 185 lb (83.9 kg)  SpO2 96%  BMI 27.52 kg/m2 Estimated body mass index is 27.52 kg/(m^2) as calculated from the following:    Height as of 5/21/18: 5' 8.75\" (1.746 m).    Weight as of this encounter: 185 lb (83.9 kg).  Medication Reconciliation: complete    Ju Colon MA    "

## 2018-06-05 ENCOUNTER — TELEPHONE (OUTPATIENT)
Dept: FAMILY MEDICINE | Facility: OTHER | Age: 63
End: 2018-06-05

## 2018-06-05 ASSESSMENT — ANXIETY QUESTIONNAIRES: GAD7 TOTAL SCORE: 7

## 2018-06-05 ASSESSMENT — PATIENT HEALTH QUESTIONNAIRE - PHQ9: SUM OF ALL RESPONSES TO PHQ QUESTIONS 1-9: 9

## 2018-06-05 NOTE — TELEPHONE ENCOUNTER
8:09 AM    Reason for Call: Phone Call    Description: Pt called and stated provider was going to send some info to his employer ([employer]). The fax number for the HR office is [phone]. Please call pt back at [phone] if any questions    Was an appointment offered for this call? No  If yes : Appointment type              Date    Preferred method for responding to this message: Telephone Call  What is your phone number ?    If we cannot reach you directly, may we leave a detailed response at the number you provided? Yes    Can this message wait until your PCP/provider returns, if available today? Not applicable    Cece Espinoza

## 2018-06-17 DIAGNOSIS — E78.2 MIXED HYPERLIPIDEMIA: ICD-10-CM

## 2018-06-18 RX ORDER — ATORVASTATIN CALCIUM 40 MG/1
TABLET, FILM COATED ORAL
Qty: 30 TABLET | Refills: 6 | Status: SHIPPED | OUTPATIENT
Start: 2018-06-18 | End: 2019-07-10

## 2018-06-24 ENCOUNTER — TRANSFERRED RECORDS (OUTPATIENT)
Dept: HEALTH INFORMATION MANAGEMENT | Facility: CLINIC | Age: 63
End: 2018-06-24

## 2018-07-10 ENCOUNTER — TELEPHONE (OUTPATIENT)
Dept: FAMILY MEDICINE | Facility: OTHER | Age: 63
End: 2018-07-10

## 2018-07-10 NOTE — TELEPHONE ENCOUNTER
2:11 PM    Reason for Call: Phone Call    Description: Pt called and stated he was suppose to have SNA paperwork faxed to his employer, but they did not receive it. Please call him back at [phone]    Was an appointment offered for this call? No  If yes : Appointment type              Date    Preferred method for responding to this message: Telephone Call  What is your phone number ?    If we cannot reach you directly, may we leave a detailed response at the number you provided? Yes    Can this message wait until your PCP/provider returns, if available today? Not applicable    Cece Espinoza

## 2018-07-13 ENCOUNTER — OFFICE VISIT (OUTPATIENT)
Dept: FAMILY MEDICINE | Facility: OTHER | Age: 63
End: 2018-07-13
Attending: FAMILY MEDICINE
Payer: COMMERCIAL

## 2018-07-13 VITALS
BODY MASS INDEX: 27.73 KG/M2 | TEMPERATURE: 97.7 F | DIASTOLIC BLOOD PRESSURE: 68 MMHG | WEIGHT: 186.4 LBS | HEART RATE: 72 BPM | SYSTOLIC BLOOD PRESSURE: 106 MMHG | OXYGEN SATURATION: 95 %

## 2018-07-13 DIAGNOSIS — I10 ESSENTIAL HYPERTENSION: ICD-10-CM

## 2018-07-13 DIAGNOSIS — K40.20 BILATERAL INGUINAL HERNIA WITHOUT OBSTRUCTION OR GANGRENE, RECURRENCE NOT SPECIFIED: ICD-10-CM

## 2018-07-13 DIAGNOSIS — Z87.891 PERSONAL HISTORY OF TOBACCO USE, PRESENTING HAZARDS TO HEALTH: ICD-10-CM

## 2018-07-13 DIAGNOSIS — I25.10 CORONARY ARTERY DISEASE INVOLVING NATIVE CORONARY ARTERY OF NATIVE HEART WITHOUT ANGINA PECTORIS: ICD-10-CM

## 2018-07-13 DIAGNOSIS — Z01.810 PRE-OPERATIVE CARDIOVASCULAR EXAMINATION: Primary | ICD-10-CM

## 2018-07-13 DIAGNOSIS — Z91.89: ICD-10-CM

## 2018-07-13 LAB
ANION GAP SERPL CALCULATED.3IONS-SCNC: 5 MMOL/L (ref 3–14)
BASOPHILS # BLD AUTO: 0 10E9/L (ref 0–0.2)
BASOPHILS NFR BLD AUTO: 0.4 %
BUN SERPL-MCNC: 13 MG/DL (ref 7–30)
CALCIUM SERPL-MCNC: 8.8 MG/DL (ref 8.5–10.1)
CHLORIDE SERPL-SCNC: 104 MMOL/L (ref 94–109)
CO2 SERPL-SCNC: 30 MMOL/L (ref 20–32)
CREAT SERPL-MCNC: 0.93 MG/DL (ref 0.66–1.25)
DIFFERENTIAL METHOD BLD: ABNORMAL
EOSINOPHIL # BLD AUTO: 0 10E9/L (ref 0–0.7)
EOSINOPHIL NFR BLD AUTO: 0.8 %
ERYTHROCYTE [DISTWIDTH] IN BLOOD BY AUTOMATED COUNT: 13.8 % (ref 10–15)
GFR SERPL CREATININE-BSD FRML MDRD: 83 ML/MIN/1.7M2
GLUCOSE SERPL-MCNC: 107 MG/DL (ref 70–99)
HCT VFR BLD AUTO: 40.9 % (ref 40–53)
HGB BLD-MCNC: 14 G/DL (ref 13.3–17.7)
IMM GRANULOCYTES # BLD: 0 10E9/L (ref 0–0.4)
IMM GRANULOCYTES NFR BLD: 0.2 %
LYMPHOCYTES # BLD AUTO: 1.2 10E9/L (ref 0.8–5.3)
LYMPHOCYTES NFR BLD AUTO: 25.1 %
MCH RBC QN AUTO: 32.3 PG (ref 26.5–33)
MCHC RBC AUTO-ENTMCNC: 34.2 G/DL (ref 31.5–36.5)
MCV RBC AUTO: 95 FL (ref 78–100)
MONOCYTES # BLD AUTO: 0.4 10E9/L (ref 0–1.3)
MONOCYTES NFR BLD AUTO: 8.7 %
NEUTROPHILS # BLD AUTO: 3.1 10E9/L (ref 1.6–8.3)
NEUTROPHILS NFR BLD AUTO: 64.8 %
NRBC # BLD AUTO: 0 10*3/UL
NRBC BLD AUTO-RTO: 0 /100
PLATELET # BLD AUTO: 186 10E9/L (ref 150–450)
POTASSIUM SERPL-SCNC: 4.6 MMOL/L (ref 3.4–5.3)
RBC # BLD AUTO: 4.33 10E12/L (ref 4.4–5.9)
SODIUM SERPL-SCNC: 139 MMOL/L (ref 133–144)
WBC # BLD AUTO: 4.8 10E9/L (ref 4–11)

## 2018-07-13 PROCEDURE — 80048 BASIC METABOLIC PNL TOTAL CA: CPT | Performed by: FAMILY MEDICINE

## 2018-07-13 PROCEDURE — 85025 COMPLETE CBC W/AUTO DIFF WBC: CPT | Performed by: FAMILY MEDICINE

## 2018-07-13 PROCEDURE — 93000 ELECTROCARDIOGRAM COMPLETE: CPT | Performed by: INTERNAL MEDICINE

## 2018-07-13 PROCEDURE — 36415 COLL VENOUS BLD VENIPUNCTURE: CPT | Performed by: FAMILY MEDICINE

## 2018-07-13 PROCEDURE — 99214 OFFICE O/P EST MOD 30 MIN: CPT | Mod: 25 | Performed by: FAMILY MEDICINE

## 2018-07-13 ASSESSMENT — ANXIETY QUESTIONNAIRES
GAD7 TOTAL SCORE: 1
5. BEING SO RESTLESS THAT IT IS HARD TO SIT STILL: NOT AT ALL
6. BECOMING EASILY ANNOYED OR IRRITABLE: SEVERAL DAYS
IF YOU CHECKED OFF ANY PROBLEMS ON THIS QUESTIONNAIRE, HOW DIFFICULT HAVE THESE PROBLEMS MADE IT FOR YOU TO DO YOUR WORK, TAKE CARE OF THINGS AT HOME, OR GET ALONG WITH OTHER PEOPLE: NOT DIFFICULT AT ALL
2. NOT BEING ABLE TO STOP OR CONTROL WORRYING: NOT AT ALL
7. FEELING AFRAID AS IF SOMETHING AWFUL MIGHT HAPPEN: NOT AT ALL
3. WORRYING TOO MUCH ABOUT DIFFERENT THINGS: NOT AT ALL
4. TROUBLE RELAXING: NOT AT ALL
1. FEELING NERVOUS, ANXIOUS, OR ON EDGE: NOT AT ALL

## 2018-07-13 ASSESSMENT — PAIN SCALES - GENERAL: PAINLEVEL: NO PAIN (0)

## 2018-07-13 NOTE — MR AVS SNAPSHOT
After Visit Summary   7/13/2018    Anthony Steele    MRN: [id number]           Patient Information     Date Of Birth          1955        Visit Information        Provider Department      7/13/2018 9:00 AM Abdulkadir Huizar MD [facility]        Today's Diagnoses     Pre-operative cardiovascular examination    -  1    Bilateral inguinal hernia without obstruction or gangrene, recurrence not specified        Personal history of tobacco use, presenting hazards to health        Essential hypertension        Coronary artery disease involving native coronary artery of native heart without angina pectoris        H/O food poisoning          Care Instructions      Before Your Surgery      Call your surgeon if there is any change in your health. This includes signs of a cold or flu (such as a sore throat, runny nose, cough, rash or fever).    Do not smoke, drink alcohol or take over the counter medicine (unless your surgeon or primary care doctor tells you to) for the 24 hours before and after surgery.    If you take prescribed drugs: Follow your doctor s orders about which medicines to take and which to stop until after surgery.    Eating and drinking prior to surgery: follow the instructions from your surgeon    Take a shower or bath the night before surgery. Use the soap your surgeon gave you to gently clean your skin. If you do not have soap from your surgeon, use your regular soap. Do not shave or scrub the surgery site.  Wear clean pajamas and have clean sheets on your bed.     STOP ASPIRIN FOR WEEK BEFORE SURGERY    HOLD ALL SUPPLEMENTS A WEEK BEFORE SURGERY     HOLD LISINOPRIL DAY OF SURGERY     Results for orders placed or performed in visit on 07/13/18 (from the past 24 hour(s))   CBC with platelets differential   Result Value Ref Range    WBC 4.8 4.0 - 11.0 10e9/L    RBC Count 4.33 (L) 4.4 - 5.9 10e12/L    Hemoglobin 14.0 13.3 - 17.7 g/dL    Hematocrit 40.9 40.0 - 53.0 %    MCV 95  78 - 100 fl    MCH 32.3 26.5 - 33.0 pg    MCHC 34.2 31.5 - 36.5 g/dL    RDW 13.8 10.0 - 15.0 %    Platelet Count 186 150 - 450 10e9/L    Diff Method Automated Method     % Neutrophils 64.8 %    % Lymphocytes 25.1 %    % Monocytes 8.7 %    % Eosinophils 0.8 %    % Basophils 0.4 %    % Immature Granulocytes 0.2 %    Nucleated RBCs 0 0 /100    Absolute Neutrophil 3.1 1.6 - 8.3 10e9/L    Absolute Lymphocytes 1.2 0.8 - 5.3 10e9/L    Absolute Monocytes 0.4 0.0 - 1.3 10e9/L    Absolute Eosinophils 0.0 0.0 - 0.7 10e9/L    Absolute Basophils 0.0 0.0 - 0.2 10e9/L    Abs Immature Granulocytes 0.0 0 - 0.4 10e9/L    Absolute Nucleated RBC 0.0    Basic metabolic panel   Result Value Ref Range    Sodium 139 133 - 144 mmol/L    Potassium 4.6 3.4 - 5.3 mmol/L    Chloride 104 94 - 109 mmol/L    Carbon Dioxide 30 20 - 32 mmol/L    Anion Gap 5 3 - 14 mmol/L    Glucose 107 (H) 70 - 99 mg/dL    Urea Nitrogen 13 7 - 30 mg/dL    Creatinine 0.93 0.66 - 1.25 mg/dL    GFR Estimate 83 >60 mL/min/1.7m2    GFR Estimate If Black >90 >60 mL/min/1.7m2    Calcium 8.8 8.5 - 10.1 mg/dL               Follow-ups after your visit        Your next 10 appointments already scheduled     Jul 18, 2018   Procedure with Louis Tomlin DO   [facility] ([facility] )    [address] [place] [address]  [place] [id number] [phone]            Jul 31, 2018  9:00 AM CDT   (Arrive by 8:45 AM)   Post Op with Louis Tomlin DO   [facility] ([facility] )    [address] [place] [address]  [place] [address]   [phone]              Who to contact     If you have questions or need follow up information about today's clinic visit or your schedule please contact [facility] directly at [phone].  Normal or non-critical lab and imaging results will be communicated to you by MyChart, letter or phone within 4 business days after the clinic has received the results. If you do not hear from us within 7 days,  please contact the clinic through [employer] or phone. If you have a critical or abnormal lab result, we will notify you by phone as soon as possible.  Submit refill requests through [employer] or call your pharmacy and they will forward the refill request to us. Please allow 3 business days for your refill to be completed.          Additional Information About Your Visit        Care EveryWhere ID     This is your Care EveryWhere ID. This could be used by other organizations to access your [facility] medical records  [id number]        Your Vitals Were     Pulse Temperature Pulse Oximetry BMI (Body Mass Index)          72 97.7  F (36.5  C) (Tympanic) 95% 27.73 kg/m2         Blood Pressure from Last 3 Encounters:   07/13/18 106/68   06/04/18 108/62   05/21/18 112/70    Weight from Last 3 Encounters:   07/13/18 186 lb 6.4 oz (84.6 kg)   06/04/18 185 lb (83.9 kg)   05/21/18 190 lb (86.2 kg)              We Performed the Following     Basic metabolic panel     CBC with platelets differential        Primary Care Provider Office Phone # Fax #    Abdulkadir Huizar [phone]       [facility] [place] [address]        Equal Access to Services     BRENDA CROWE AH: Hadii stephanie sadlero Solana, waaxda luqadaha, qaybta kaalmada adeegyada, devendra tolentino. So [facility] [phone].    ATENCIÓN: Si habla español, tiene a arita disposición servicios gratuitos de asistencia lingüística. [facility] [phone].    We comply with applicable federal civil rights laws and [place] laws. We do not discriminate on the basis of race, color, national origin, age, disability, sex, sexual orientation, or gender identity.            Thank you!     Thank you for choosing [facility]  for your care. Our goal is always to provide you with excellent care. Hearing back from our patients is one way we can continue to improve our services. Please take a few minutes to complete the written survey that  you may receive in the mail after your visit with us. Thank you!             Your Updated Medication List - Protect others around you: Learn how to safely use, store and throw away your medicines at www.disposemymeds.org.          This list is accurate as of 7/13/18  9:42 AM.  Always use your most recent med list.                   Brand Name Dispense Instructions for use Diagnosis    aspirin 325 MG tablet     180 tablet    Take 1 tablet (325 mg) by mouth daily    Coronary artery disease involving native coronary artery of native heart without angina pectoris       atorvastatin 40 MG tablet    LIPITOR    30 tablet    TAKE 1 TABLET DAILY BY MOUTH - GENERIC FOR LIPITOR    Mixed hyperlipidemia       buPROPion 300 MG 24 hr tablet    WELLBUTRIN XL    90 tablet    TAKE 1 TABLET DAILY BY MOUTH    Adjustment disorder with depressed mood       EPINEPHrine 0.3 MG/0.3ML injection 2-pack    EPIPEN/ADRENACLICK/or ANY BX GENERIC EQUIV    0.6 mL    Inject 0.3 mLs (0.3 mg) into the muscle as needed for anaphylaxis    Shellfish allergy       lisinopril 10 MG tablet    PRINIVIL/ZESTRIL    90 tablet    TAKE 1 TABLET BY MOUTH DAILY- GENERIC FOR ZESTRIL    Mixed hyperlipidemia       metoprolol tartrate 25 MG tablet    LOPRESSOR    180 tablet    TAKE 1 TABLET BY MOUTH TWICE DAILY    Essential hypertension with goal blood pressure less than 140/90       nitroGLYcerin 0.4 MG sublingual tablet    NITROSTAT    25 tablet    PLACE 1 TABLET UNDER THE TONGUE EVERY FIVE MINUTES AS NEEDED FOR CHEST PAIN. DO NOT CRUSH; MAXIMUM OF 3 DOSES IN 15 MINUTES.    Coronary artery disease involving native coronary artery of native heart without angina pectoris       OMEGA-3 FISH OIL PO      Take 1 g by mouth        omeprazole 40 MG capsule    priLOSEC    90 capsule    TAKE 1 CAPSULE BY MOUTH ONCE DAILY. TAKE 30 TO 60 MINUTES BEFORE A MEAL.    Gastroesophageal reflux disease without esophagitis

## 2018-07-13 NOTE — PROGRESS NOTES
[facility] [place]  [address]  [place] [address]  [phone]  Dept: [phone]    PRE-OP EVALUATION:  Today's date: 2018    Anthony Steele (: 1955) presents for pre-operative evaluation assessment as requested by Dr. Tomlin.  He requires evaluation and anesthesia risk assessment prior to undergoing surgery/procedure for treatment of  Bilateral inguinal hernia .    Proposed Surgery/ Procedure: bilateral inguinal hernia  Date of Surgery/ Procedure: 18  Time of Surgery/ Procedure: [facility]  Hospital/Surgical Facility: [facility]   Primary Physician: Abdulkadir Huizar  Type of Anesthesia Anticipated: to be determined    Patient has a Health Care Directive or Living Will:  NO    1. Yes - Stent in heart - Do you have a history of heart attack, stroke, stent, bypass or surgery on an artery in the head, neck, heart or legs?  2. NO - Do you ever have any pain or discomfort in your chest?  3. NO - Do you have a history of  Heart Failure?  4. NO - Are you troubled by shortness of breath when: walking on the level, up a slight hill or at night?  5. NO - Do you currently have a cold, bronchitis or other respiratory infection?  6. NO - Do you have a cough, shortness of breath or wheezing?  7. NO - Do you sometimes get pains in the calves of your legs when you walk?  8. NO - Do you or anyone in your family have previous history of blood clots?  9. NO - Do you or does anyone in your family have a serious bleeding problem such as prolonged bleeding following surgeries or cuts?  10. NO - Have you ever had problems with anemia or been told to take iron pills?  11. Yes when in ER last week with food poisoning, is cleared up now. - Have you had any abnormal blood loss such as black, tarry or bloody stools, or abnormal vaginal bleeding?  12. NO - Have you ever had a blood transfusion?  13. NO - Have you or any of your relatives ever had problems with anesthesia?  14. NO - Do you have sleep apnea,  excessive snoring or daytime drowsiness?  15. NO - Do you have any prosthetic heart valves?  16. NO - Do you have prosthetic joints?  17. NO - Is there any chance that you may be pregnant?      HPI:     HPI related to upcoming procedure:   62 yO male  presents for cardiopulmonary/general clearance to undergo the above procedure.  His surgeon listed above has asked for this clearance to undergo anesthesia. Pt has had this condition for approximately a year or so.   Overall pt is of good health and has not  had any perioperative complications with previous surgeries.      Pt was in [place] - came down with GI distress with bloody mucus stools - had ER w/u and f/u - thought to be food poisoning. Pt is better now.     CAD - Patient has a longstanding history of moderate-severe CAD. Patient denies recent chest pain or NTG use, denies exercise induced dyspnea or PND. Last Stress test - angioplasty in 2013, EKG today - unremarkable .                                                                                                                                                    .  HYPERTENSION - Patient has longstanding history of HTN , currently denies any symptoms referable to elevated blood pressure. Specifically denies chest pain, palpitations, dyspnea, orthopnea, PND or peripheral edema. Blood pressure readings have been in normal range. Current medication regimen is as listed below. Patient denies any side effects of medication.                                                                                                                                                                                          .    MEDICAL HISTORY:     Patient Active Problem List    Diagnosis Date Noted     Primary osteoarthritis involving multiple joints 05/01/2018     Priority: Medium     Non-recurrent bilateral inguinal hernia without obstruction or gangrene 11/01/2017     Priority: Medium     Gastroesophageal reflux disease  without esophagitis 03/06/2017     Priority: Medium     Nocturia 03/06/2017     Priority: Medium     Adjustment disorder with depressed mood 03/06/2017     Priority: Medium     Tobacco abuse, in remission      Priority: Medium     Coronary artery disease involving native coronary artery without angina pectoris 07/24/2015     Priority: Medium     Essential hypertension 07/24/2015     Priority: Medium     Colon polyps      Priority: Medium     Prediabetes      Priority: Medium     H/O ETOH abuse      Priority: Medium     Displacement of cervical intervertebral disc without myelopathy(aka HERNIATED DISK)      Priority: Medium     Advanced care planning/counseling discussion 03/16/2012     Priority: Medium     Personal history of tobacco use, presenting hazards to health 09/06/2011     Priority: Medium     Tobacco abuse, in remission       Mixed hyperlipidemia 08/03/2011     Priority: Medium      Past Medical History:   Diagnosis Date     Acute myocardial infarction, unspecified site, episode of care unspecified 08/03/2011     Adjustment disorder with mixed anxiety and depressed mood 05/23/2012     CAD (coronary artery disease) 3/19/2013     Colon polyps      Coronary atherosclerosis of unspecified type of vessel, native or graft 01/06/2012     Displacement of cervical intervertebral disc without myelopathy      GERD (gastroesophageal reflux disease)      H/O ETOH abuse      HTN (hypertension)      Mixed hyperlipidemia 08/03/2011     Personal history of tobacco use, presenting hazards to health 09/06/2011    Tobacco abuse, in remission     Postsurgical percutaneous transluminal coronary angioplasty status 08/03/2011     Prediabetes      Status post coronary angiogram 3/19/2013     Tobacco abuse, in remission      Past Surgical History:   Procedure Laterality Date     ANGIOPLASTY  [id number]    Failed angioplasty of OM vessel     ARTHROSCOPY KNEE      RT     BACK SURGERY      C5-6 fusion     Carpal Tunnel Syndrome  2003     Resolved from Problem List  2012     COLONOSCOPY  2008    colonoscopy with polypectomy/ Repeat in 2011     COLONOSCOPY  3/24/2014    Repeat in 2019//Procedure: COLONOSCOPY;  COLONOSCOPY;  Surgeon: Jessica Washington MD;  Location: [facility]     RELEASE CARPAL TUNNEL      RT     STENT  07/2011    Myocardial Infarction; Stent placement [facility]     STENT, CORONARY, LIZZIE  2012     Current Outpatient Prescriptions   Medication Sig Dispense Refill     aspirin 325 MG tablet Take 1 tablet (325 mg) by mouth daily 180 tablet      atorvastatin (LIPITOR) 40 MG tablet TAKE 1 TABLET DAILY BY MOUTH - GENERIC FOR LIPITOR 30 tablet 6     buPROPion (WELLBUTRIN XL) 300 MG 24 hr tablet TAKE 1 TABLET DAILY BY MOUTH 90 tablet 1     EPINEPHrine (EPIPEN/ADRENACLICK/OR ANY BX GENERIC EQUIV) 0.3 MG/0.3ML injection 2-pack Inject 0.3 mLs (0.3 mg) into the muscle as needed for anaphylaxis (Patient not taking: Reported on 5/21/2018) 0.6 mL 3     lisinopril (PRINIVIL/ZESTRIL) 10 MG tablet TAKE 1 TABLET BY MOUTH DAILY- GENERIC FOR ZESTRIL 90 tablet 3     metoprolol tartrate (LOPRESSOR) 25 MG tablet TAKE 1 TABLET BY MOUTH TWICE DAILY 180 tablet 1     nitroglycerin (NITROSTAT) 0.4 MG sublingual tablet PLACE 1 TABLET UNDER THE TONGUE EVERY FIVE MINUTES AS NEEDED FOR CHEST PAIN. DO NOT CRUSH; MAXIMUM OF 3 DOSES IN 15 MINUTES. 25 tablet 5     Omega-3 Fatty Acids (OMEGA-3 FISH OIL PO) Take 1 g by mouth       omeprazole (PRILOSEC) 40 MG capsule TAKE 1 CAPSULE BY MOUTH ONCE DAILY. TAKE 30 TO 60 MINUTES BEFORE A MEAL. 90 capsule 1     OTC products: None, except as noted above    Allergies   Allergen Reactions     Codeine GI Disturbance     Upsets stomach       Shellfish-Derived Products Other (See Comments)     Hives/ nasal congestion and swelling       Latex Allergy: NO    Social History   Substance Use Topics     Smoking status: Former Smoker     Packs/day: 1.00     Years: 20.00     Smokeless tobacco: Never Used      Comment: Tried to quit: yes; longest  period tobacco-free- 10 years     Alcohol use Yes      Comment: Occasionally     History   Drug Use Not on file       REVIEW OF SYSTEMS:   Constitutional, neuro, ENT, endocrine, pulmonary, cardiac, gastrointestinal, genitourinary, musculoskeletal, integument and psychiatric systems are negative, except as otherwise noted.    EXAM:   /68 (BP Location: Right arm, Patient Position: Chair, Cuff Size: Adult Regular)  Pulse 72  Temp 97.7  F (36.5  C) (Tympanic)  Wt 186 lb 6.4 oz (84.6 kg)  SpO2 95%  BMI 27.73 kg/m2    GENERAL APPEARANCE: healthy, alert and no distress     EYES: EOMI,  PERRL     HENT: ear canals and TM's normal and nose and mouth without ulcers or lesions     NECK: no adenopathy, no asymmetry, masses, or scars and thyroid normal to palpation     RESP: lungs clear to auscultation - no rales, rhonchi or wheezes     CV: regular rates and rhythm, normal S1 S2, no S3 or S4 and no murmur, click or rub     ABDOMEN:  soft, nontender, no HSM or masses and bowel sounds normal     MS: extremities normal- no gross deformities noted, no evidence of inflammation in joints, FROM in all extremities.     SKIN: no suspicious lesions or rashes     NEURO: Normal strength and tone, sensory exam grossly normal, mentation intact and speech normal     PSYCH: mentation appears normal. and affect normal/bright     LYMPHATICS: No cervical adenopathy    DIAGNOSTICS:   EKG: appears normal, NSR, sinus bradycardia, normal axis, normal intervals, no acute ST/T changes c/w ischemia, no LVH by voltage criteria, unchanged from previous tracings    Recent Labs   Lab Test  05/01/18   0805  04/26/18   1612  03/06/17   0749   HGB  13.5  13.5  15.7   PLT  215  193  198   NA  141  139  138   POTASSIUM  3.7  4.4  4.3   CR  0.85  0.88  0.99   A1C  6.2   --   5.8      Results for orders placed or performed in visit on 07/13/18 (from the past 24 hour(s))   CBC with platelets differential   Result Value Ref Range    WBC 4.8 4.0 - 11.0  10e9/L    RBC Count 4.33 (L) 4.4 - 5.9 10e12/L    Hemoglobin 14.0 13.3 - 17.7 g/dL    Hematocrit 40.9 40.0 - 53.0 %    MCV 95 78 - 100 fl    MCH 32.3 26.5 - 33.0 pg    MCHC 34.2 31.5 - 36.5 g/dL    RDW 13.8 10.0 - 15.0 %    Platelet Count 186 150 - 450 10e9/L    Diff Method Automated Method     % Neutrophils 64.8 %    % Lymphocytes 25.1 %    % Monocytes 8.7 %    % Eosinophils 0.8 %    % Basophils 0.4 %    % Immature Granulocytes 0.2 %    Nucleated RBCs 0 0 /100    Absolute Neutrophil 3.1 1.6 - 8.3 10e9/L    Absolute Lymphocytes 1.2 0.8 - 5.3 10e9/L    Absolute Monocytes 0.4 0.0 - 1.3 10e9/L    Absolute Eosinophils 0.0 0.0 - 0.7 10e9/L    Absolute Basophils 0.0 0.0 - 0.2 10e9/L    Abs Immature Granulocytes 0.0 0 - 0.4 10e9/L    Absolute Nucleated RBC 0.0    Basic metabolic panel   Result Value Ref Range    Sodium 139 133 - 144 mmol/L    Potassium 4.6 3.4 - 5.3 mmol/L    Chloride 104 94 - 109 mmol/L    Carbon Dioxide 30 20 - 32 mmol/L    Anion Gap 5 3 - 14 mmol/L    Glucose 107 (H) 70 - 99 mg/dL    Urea Nitrogen 13 7 - 30 mg/dL    Creatinine 0.93 0.66 - 1.25 mg/dL    GFR Estimate 83 >60 mL/min/1.7m2    GFR Estimate If Black >90 >60 mL/min/1.7m2    Calcium 8.8 8.5 - 10.1 mg/dL         IMPRESSION:   Reason for surgery/procedure: Bilateral Inguinal hernia   Diagnosis/reason for consult: cardiopulmonary clearance     The proposed surgical procedure is considered LOW risk.    REVISED CARDIAC RISK INDEX  The patient has the following serious cardiovascular risks for perioperative complications such as (MI, PE, VFib and 3  AV Block):  No serious cardiac risks  INTERPRETATION: 1 risks: Class II (low risk - 0.9% complication rate)    The patient has the following additional risks for perioperative complications:  No identified additional risks      ICD-10-CM    1. Pre-operative cardiovascular examination Z01.810 CBC with platelets differential     Basic metabolic panel     EKG 12-lead complete w/read - Clinics     CBC with  platelets differential     Basic metabolic panel     EKG 12-lead complete w/read - Clinics   2. Bilateral inguinal hernia without obstruction or gangrene, recurrence not specified K40.20 CBC with platelets differential     Basic metabolic panel     EKG 12-lead complete w/read - Clinics     CBC with platelets differential     Basic metabolic panel     EKG 12-lead complete w/read - Clinics   3. Personal history of tobacco use, presenting hazards to health Z87.891 CBC with platelets differential     Basic metabolic panel     EKG 12-lead complete w/read - Clinics     CBC with platelets differential     Basic metabolic panel     EKG 12-lead complete w/read - Clinics   4. Essential hypertension I10 CBC with platelets differential     Basic metabolic panel     EKG 12-lead complete w/read - Clinics     CBC with platelets differential     Basic metabolic panel     EKG 12-lead complete w/read - Clinics   5. Coronary artery disease involving native coronary artery of native heart without angina pectoris I25.10 CBC with platelets differential     Basic metabolic panel     EKG 12-lead complete w/read - Clinics     CBC with platelets differential     Basic metabolic panel     EKG 12-lead complete w/read - Clinics   6. H/O food poisoning Z91.89        Pt is back to normal form bout of what sounded like food poisoning     RECOMMENDATIONS:         --Patient is to take all scheduled medications on the day of surgery EXCEPT for modifications listed below.    APPROVAL GIVEN to proceed with proposed procedure, without further diagnostic evaluation       Signed Electronically by: Abdulkadir Huizar MD    Copy of this evaluation report is provided to requesting physician.    [facility] Preop Guidelines    Revised Cardiac Risk Index

## 2018-07-13 NOTE — NURSING NOTE
"Chief Complaint   Patient presents with     Pre-Op Exam     Bilateral inguinal hernia - Dr. Tomlin        Initial /68 (BP Location: Right arm, Patient Position: Chair, Cuff Size: Adult Regular)  Pulse 72  Temp 97.7  F (36.5  C) (Tympanic)  Wt 186 lb 6.4 oz (84.6 kg)  SpO2 95%  BMI 27.73 kg/m2 Estimated body mass index is 27.73 kg/(m^2) as calculated from the following:    Height as of 5/21/18: 5' 8.75\" (1.746 m).    Weight as of this encounter: 186 lb 6.4 oz (84.6 kg).  Medication Reconciliation: complete    Nuha Landry    "

## 2018-07-13 NOTE — PATIENT INSTRUCTIONS
Before Your Surgery      Call your surgeon if there is any change in your health. This includes signs of a cold or flu (such as a sore throat, runny nose, cough, rash or fever).    Do not smoke, drink alcohol or take over the counter medicine (unless your surgeon or primary care doctor tells you to) for the 24 hours before and after surgery.    If you take prescribed drugs: Follow your doctor s orders about which medicines to take and which to stop until after surgery.    Eating and drinking prior to surgery: follow the instructions from your surgeon    Take a shower or bath the night before surgery. Use the soap your surgeon gave you to gently clean your skin. If you do not have soap from your surgeon, use your regular soap. Do not shave or scrub the surgery site.  Wear clean pajamas and have clean sheets on your bed.     STOP ASPIRIN FOR WEEK BEFORE SURGERY    HOLD ALL SUPPLEMENTS A WEEK BEFORE SURGERY     HOLD LISINOPRIL DAY OF SURGERY     Results for orders placed or performed in visit on 07/13/18 (from the past 24 hour(s))   CBC with platelets differential   Result Value Ref Range    WBC 4.8 4.0 - 11.0 10e9/L    RBC Count 4.33 (L) 4.4 - 5.9 10e12/L    Hemoglobin 14.0 13.3 - 17.7 g/dL    Hematocrit 40.9 40.0 - 53.0 %    MCV 95 78 - 100 fl    MCH 32.3 26.5 - 33.0 pg    MCHC 34.2 31.5 - 36.5 g/dL    RDW 13.8 10.0 - 15.0 %    Platelet Count 186 150 - 450 10e9/L    Diff Method Automated Method     % Neutrophils 64.8 %    % Lymphocytes 25.1 %    % Monocytes 8.7 %    % Eosinophils 0.8 %    % Basophils 0.4 %    % Immature Granulocytes 0.2 %    Nucleated RBCs 0 0 /100    Absolute Neutrophil 3.1 1.6 - 8.3 10e9/L    Absolute Lymphocytes 1.2 0.8 - 5.3 10e9/L    Absolute Monocytes 0.4 0.0 - 1.3 10e9/L    Absolute Eosinophils 0.0 0.0 - 0.7 10e9/L    Absolute Basophils 0.0 0.0 - 0.2 10e9/L    Abs Immature Granulocytes 0.0 0 - 0.4 10e9/L    Absolute Nucleated RBC 0.0    Basic metabolic panel   Result Value Ref Range     Sodium 139 133 - 144 mmol/L    Potassium 4.6 3.4 - 5.3 mmol/L    Chloride 104 94 - 109 mmol/L    Carbon Dioxide 30 20 - 32 mmol/L    Anion Gap 5 3 - 14 mmol/L    Glucose 107 (H) 70 - 99 mg/dL    Urea Nitrogen 13 7 - 30 mg/dL    Creatinine 0.93 0.66 - 1.25 mg/dL    GFR Estimate 83 >60 mL/min/1.7m2    GFR Estimate If Black >90 >60 mL/min/1.7m2    Calcium 8.8 8.5 - 10.1 mg/dL

## 2018-07-13 NOTE — H&P (VIEW-ONLY)
[facility] [place]  [address]  [place] [address]  [phone]  Dept: [phone]    PRE-OP EVALUATION:  Today's date: 2018    Anthony Steele (: 1955) presents for pre-operative evaluation assessment as requested by Dr. Tomlin.  He requires evaluation and anesthesia risk assessment prior to undergoing surgery/procedure for treatment of  Bilateral inguinal hernia .    Proposed Surgery/ Procedure: bilateral inguinal hernia  Date of Surgery/ Procedure: 18  Time of Surgery/ Procedure: [facility]  Hospital/Surgical Facility: [facility]   Primary Physician: Abdulkadir Huizar  Type of Anesthesia Anticipated: to be determined    Patient has a Health Care Directive or Living Will:  NO    1. Yes - Stent in heart - Do you have a history of heart attack, stroke, stent, bypass or surgery on an artery in the head, neck, heart or legs?  2. NO - Do you ever have any pain or discomfort in your chest?  3. NO - Do you have a history of  Heart Failure?  4. NO - Are you troubled by shortness of breath when: walking on the level, up a slight hill or at night?  5. NO - Do you currently have a cold, bronchitis or other respiratory infection?  6. NO - Do you have a cough, shortness of breath or wheezing?  7. NO - Do you sometimes get pains in the calves of your legs when you walk?  8. NO - Do you or anyone in your family have previous history of blood clots?  9. NO - Do you or does anyone in your family have a serious bleeding problem such as prolonged bleeding following surgeries or cuts?  10. NO - Have you ever had problems with anemia or been told to take iron pills?  11. Yes when in ER last week with food poisoning, is cleared up now. - Have you had any abnormal blood loss such as black, tarry or bloody stools, or abnormal vaginal bleeding?  12. NO - Have you ever had a blood transfusion?  13. NO - Have you or any of your relatives ever had problems with anesthesia?  14. NO - Do you have sleep apnea,  excessive snoring or daytime drowsiness?  15. NO - Do you have any prosthetic heart valves?  16. NO - Do you have prosthetic joints?  17. NO - Is there any chance that you may be pregnant?      HPI:     HPI related to upcoming procedure:   62 yO male  presents for cardiopulmonary/general clearance to undergo the above procedure.  His surgeon listed above has asked for this clearance to undergo anesthesia. Pt has had this condition for approximately a year or so.   Overall pt is of good health and has not  had any perioperative complications with previous surgeries.      Pt was in [place] - came down with GI distress with bloody mucus stools - had ER w/u and f/u - thought to be food poisoning. Pt is better now.     CAD - Patient has a longstanding history of moderate-severe CAD. Patient denies recent chest pain or NTG use, denies exercise induced dyspnea or PND. Last Stress test - angioplasty in 2013, EKG today - unremarkable .                                                                                                                                                    .  HYPERTENSION - Patient has longstanding history of HTN , currently denies any symptoms referable to elevated blood pressure. Specifically denies chest pain, palpitations, dyspnea, orthopnea, PND or peripheral edema. Blood pressure readings have been in normal range. Current medication regimen is as listed below. Patient denies any side effects of medication.                                                                                                                                                                                          .    MEDICAL HISTORY:     Patient Active Problem List    Diagnosis Date Noted     Primary osteoarthritis involving multiple joints 05/01/2018     Priority: Medium     Non-recurrent bilateral inguinal hernia without obstruction or gangrene 11/01/2017     Priority: Medium     Gastroesophageal reflux disease  without esophagitis 03/06/2017     Priority: Medium     Nocturia 03/06/2017     Priority: Medium     Adjustment disorder with depressed mood 03/06/2017     Priority: Medium     Tobacco abuse, in remission      Priority: Medium     Coronary artery disease involving native coronary artery without angina pectoris 07/24/2015     Priority: Medium     Essential hypertension 07/24/2015     Priority: Medium     Colon polyps      Priority: Medium     Prediabetes      Priority: Medium     H/O ETOH abuse      Priority: Medium     Displacement of cervical intervertebral disc without myelopathy(aka HERNIATED DISK)      Priority: Medium     Advanced care planning/counseling discussion 03/16/2012     Priority: Medium     Personal history of tobacco use, presenting hazards to health 09/06/2011     Priority: Medium     Tobacco abuse, in remission       Mixed hyperlipidemia 08/03/2011     Priority: Medium      Past Medical History:   Diagnosis Date     Acute myocardial infarction, unspecified site, episode of care unspecified 08/03/2011     Adjustment disorder with mixed anxiety and depressed mood 05/23/2012     CAD (coronary artery disease) 3/19/2013     Colon polyps      Coronary atherosclerosis of unspecified type of vessel, native or graft 01/06/2012     Displacement of cervical intervertebral disc without myelopathy      GERD (gastroesophageal reflux disease)      H/O ETOH abuse      HTN (hypertension)      Mixed hyperlipidemia 08/03/2011     Personal history of tobacco use, presenting hazards to health 09/06/2011    Tobacco abuse, in remission     Postsurgical percutaneous transluminal coronary angioplasty status 08/03/2011     Prediabetes      Status post coronary angiogram 3/19/2013     Tobacco abuse, in remission      Past Surgical History:   Procedure Laterality Date     ANGIOPLASTY  [id number]    Failed angioplasty of OM vessel     ARTHROSCOPY KNEE      RT     BACK SURGERY      C5-6 fusion     Carpal Tunnel Syndrome  2003     Resolved from Problem List  2012     COLONOSCOPY  2008    colonoscopy with polypectomy/ Repeat in 2011     COLONOSCOPY  3/24/2014    Repeat in 2019//Procedure: COLONOSCOPY;  COLONOSCOPY;  Surgeon: Jessica Washington MD;  Location: [facility] OR     RELEASE CARPAL TUNNEL      RT     STENT  07/2011    Myocardial Infarction; Stent placement [facility]     STENT, CORONARY, LIZZIE  2012     Current Outpatient Prescriptions   Medication Sig Dispense Refill     aspirin 325 MG tablet Take 1 tablet (325 mg) by mouth daily 180 tablet      atorvastatin (LIPITOR) 40 MG tablet TAKE 1 TABLET DAILY BY MOUTH - GENERIC FOR LIPITOR 30 tablet 6     buPROPion (WELLBUTRIN XL) 300 MG 24 hr tablet TAKE 1 TABLET DAILY BY MOUTH 90 tablet 1     EPINEPHrine (EPIPEN/ADRENACLICK/OR ANY BX GENERIC EQUIV) 0.3 MG/0.3ML injection 2-pack Inject 0.3 mLs (0.3 mg) into the muscle as needed for anaphylaxis (Patient not taking: Reported on 5/21/2018) 0.6 mL 3     lisinopril (PRINIVIL/ZESTRIL) 10 MG tablet TAKE 1 TABLET BY MOUTH DAILY- GENERIC FOR ZESTRIL 90 tablet 3     metoprolol tartrate (LOPRESSOR) 25 MG tablet TAKE 1 TABLET BY MOUTH TWICE DAILY 180 tablet 1     nitroglycerin (NITROSTAT) 0.4 MG sublingual tablet PLACE 1 TABLET UNDER THE TONGUE EVERY FIVE MINUTES AS NEEDED FOR CHEST PAIN. DO NOT CRUSH; MAXIMUM OF 3 DOSES IN 15 MINUTES. 25 tablet 5     Omega-3 Fatty Acids (OMEGA-3 FISH OIL PO) Take 1 g by mouth       omeprazole (PRILOSEC) 40 MG capsule TAKE 1 CAPSULE BY MOUTH ONCE DAILY. TAKE 30 TO 60 MINUTES BEFORE A MEAL. 90 capsule 1     OTC products: None, except as noted above    Allergies   Allergen Reactions     Codeine GI Disturbance     Upsets stomach       Shellfish-Derived Products Other (See Comments)     Hives/ nasal congestion and swelling       Latex Allergy: NO    Social History   Substance Use Topics     Smoking status: Former Smoker     Packs/day: 1.00     Years: 20.00     Smokeless tobacco: Never Used      Comment: Tried to quit: yes; longest  period tobacco-free- 10 years     Alcohol use Yes      Comment: Occasionally     History   Drug Use Not on file       REVIEW OF SYSTEMS:   Constitutional, neuro, ENT, endocrine, pulmonary, cardiac, gastrointestinal, genitourinary, musculoskeletal, integument and psychiatric systems are negative, except as otherwise noted.    EXAM:   /68 (BP Location: Right arm, Patient Position: Chair, Cuff Size: Adult Regular)  Pulse 72  Temp 97.7  F (36.5  C) (Tympanic)  Wt 186 lb 6.4 oz (84.6 kg)  SpO2 95%  BMI 27.73 kg/m2    GENERAL APPEARANCE: healthy, alert and no distress     EYES: EOMI,  PERRL     HENT: ear canals and TM's normal and nose and mouth without ulcers or lesions     NECK: no adenopathy, no asymmetry, masses, or scars and thyroid normal to palpation     RESP: lungs clear to auscultation - no rales, rhonchi or wheezes     CV: regular rates and rhythm, normal S1 S2, no S3 or S4 and no murmur, click or rub     ABDOMEN:  soft, nontender, no HSM or masses and bowel sounds normal     MS: extremities normal- no gross deformities noted, no evidence of inflammation in joints, FROM in all extremities.     SKIN: no suspicious lesions or rashes     NEURO: Normal strength and tone, sensory exam grossly normal, mentation intact and speech normal     PSYCH: mentation appears normal. and affect normal/bright     LYMPHATICS: No cervical adenopathy    DIAGNOSTICS:   EKG: appears normal, NSR, sinus bradycardia, normal axis, normal intervals, no acute ST/T changes c/w ischemia, no LVH by voltage criteria, unchanged from previous tracings    Recent Labs   Lab Test  05/01/18   0805  04/26/18   1612  03/06/17   0749   HGB  13.5  13.5  15.7   PLT  215  193  198   NA  141  139  138   POTASSIUM  3.7  4.4  4.3   CR  0.85  0.88  0.99   A1C  6.2   --   5.8      Results for orders placed or performed in visit on 07/13/18 (from the past 24 hour(s))   CBC with platelets differential   Result Value Ref Range    WBC 4.8 4.0 - 11.0  10e9/L    RBC Count 4.33 (L) 4.4 - 5.9 10e12/L    Hemoglobin 14.0 13.3 - 17.7 g/dL    Hematocrit 40.9 40.0 - 53.0 %    MCV 95 78 - 100 fl    MCH 32.3 26.5 - 33.0 pg    MCHC 34.2 31.5 - 36.5 g/dL    RDW 13.8 10.0 - 15.0 %    Platelet Count 186 150 - 450 10e9/L    Diff Method Automated Method     % Neutrophils 64.8 %    % Lymphocytes 25.1 %    % Monocytes 8.7 %    % Eosinophils 0.8 %    % Basophils 0.4 %    % Immature Granulocytes 0.2 %    Nucleated RBCs 0 0 /100    Absolute Neutrophil 3.1 1.6 - 8.3 10e9/L    Absolute Lymphocytes 1.2 0.8 - 5.3 10e9/L    Absolute Monocytes 0.4 0.0 - 1.3 10e9/L    Absolute Eosinophils 0.0 0.0 - 0.7 10e9/L    Absolute Basophils 0.0 0.0 - 0.2 10e9/L    Abs Immature Granulocytes 0.0 0 - 0.4 10e9/L    Absolute Nucleated RBC 0.0    Basic metabolic panel   Result Value Ref Range    Sodium 139 133 - 144 mmol/L    Potassium 4.6 3.4 - 5.3 mmol/L    Chloride 104 94 - 109 mmol/L    Carbon Dioxide 30 20 - 32 mmol/L    Anion Gap 5 3 - 14 mmol/L    Glucose 107 (H) 70 - 99 mg/dL    Urea Nitrogen 13 7 - 30 mg/dL    Creatinine 0.93 0.66 - 1.25 mg/dL    GFR Estimate 83 >60 mL/min/1.7m2    GFR Estimate If Black >90 >60 mL/min/1.7m2    Calcium 8.8 8.5 - 10.1 mg/dL         IMPRESSION:   Reason for surgery/procedure: Bilateral Inguinal hernia   Diagnosis/reason for consult: cardiopulmonary clearance     The proposed surgical procedure is considered LOW risk.    REVISED CARDIAC RISK INDEX  The patient has the following serious cardiovascular risks for perioperative complications such as (MI, PE, VFib and 3  AV Block):  No serious cardiac risks  INTERPRETATION: 1 risks: Class II (low risk - 0.9% complication rate)    The patient has the following additional risks for perioperative complications:  No identified additional risks      ICD-10-CM    1. Pre-operative cardiovascular examination Z01.810 CBC with platelets differential     Basic metabolic panel     EKG 12-lead complete w/read - Clinics     CBC with  platelets differential     Basic metabolic panel     EKG 12-lead complete w/read - Clinics   2. Bilateral inguinal hernia without obstruction or gangrene, recurrence not specified K40.20 CBC with platelets differential     Basic metabolic panel     EKG 12-lead complete w/read - Clinics     CBC with platelets differential     Basic metabolic panel     EKG 12-lead complete w/read - Clinics   3. Personal history of tobacco use, presenting hazards to health Z87.891 CBC with platelets differential     Basic metabolic panel     EKG 12-lead complete w/read - Clinics     CBC with platelets differential     Basic metabolic panel     EKG 12-lead complete w/read - Clinics   4. Essential hypertension I10 CBC with platelets differential     Basic metabolic panel     EKG 12-lead complete w/read - Clinics     CBC with platelets differential     Basic metabolic panel     EKG 12-lead complete w/read - Clinics   5. Coronary artery disease involving native coronary artery of native heart without angina pectoris I25.10 CBC with platelets differential     Basic metabolic panel     EKG 12-lead complete w/read - Clinics     CBC with platelets differential     Basic metabolic panel     EKG 12-lead complete w/read - Clinics   6. H/O food poisoning Z91.89        Pt is back to normal form bout of what sounded like food poisoning     RECOMMENDATIONS:         --Patient is to take all scheduled medications on the day of surgery EXCEPT for modifications listed below.    APPROVAL GIVEN to proceed with proposed procedure, without further diagnostic evaluation       Signed Electronically by: Abdulkadir Huizar MD    Copy of this evaluation report is provided to requesting physician.    [facility] Preop Guidelines    Revised Cardiac Risk Index

## 2018-07-14 ASSESSMENT — PATIENT HEALTH QUESTIONNAIRE - PHQ9: SUM OF ALL RESPONSES TO PHQ QUESTIONS 1-9: 0

## 2018-07-14 ASSESSMENT — ANXIETY QUESTIONNAIRES: GAD7 TOTAL SCORE: 1

## 2018-07-17 ENCOUNTER — ANESTHESIA EVENT (OUTPATIENT)
Dept: SURGERY | Facility: HOSPITAL | Age: 63
End: 2018-07-17
Payer: COMMERCIAL

## 2018-07-17 ASSESSMENT — LIFESTYLE VARIABLES: TOBACCO_USE: 1

## 2018-07-17 NOTE — ANESTHESIA PREPROCEDURE EVALUATION
Anesthesia Evaluation     .             ROS/MED HX    ENT/Pulmonary:     (+)tobacco use, Past use , . .    Neurologic:  - neg neurologic ROS     Cardiovascular: Comment: Patient has a longstanding history of moderate-severe CAD. Patient denies recent chest pain or NTG use, denies exercise induced dyspnea or PND. Last Stress test - angioplasty in 2013, EKG today - unremarkable     (+) Dyslipidemia, hypertension--CAD, --. : . . . :. . Previous cardiac testing date:results:date: results:ECG reviewed date:7-13-18 results:appears normal, NSR, sinus bradycardia, normal axis, normal intervals, no acute ST/T changes c/w ischemia, no LVH by voltage criteria, unchanged from previous tracings    date: results:          METS/Exercise Tolerance:     Hematologic:  - neg hematologic  ROS       Musculoskeletal: Comment: Displacement of cervical intervertebral disc without myelopathy  (+) arthritis, , , -       GI/Hepatic:     (+) GERD       Renal/Genitourinary: Comment: Nocturia - ROS Renal section negative       Endo: Comment:  Prediabetes     - neg endo ROS       Psychiatric:     (+) psychiatric history depression      Infectious Disease:  - neg infectious disease ROS       Malignancy:      - no malignancy   Other: Comment: H/O ETOH abuse   - neg other ROS                 Physical Exam  Normal systems: cardiovascular and pulmonary    Airway   Mallampati: I  TM distance: >3 FB  Neck ROM: full    Dental     Cardiovascular   Rhythm and rate: regular and normal      Pulmonary    breath sounds clear to auscultation                    Anesthesia Plan      History & Physical Review  History and physical reviewed and following examination; no interval change.    ASA Status:  3 .    NPO Status:  > 8 hours    Plan for General and ETT with Intravenous induction. Maintenance will be Balanced.    PONV prophylaxis:  Ondansetron (or other 5HT-3)       Postoperative Care      Consents  Anesthetic plan, risks, benefits and alternatives  discussed with:  Patient..                          .

## 2018-07-18 ENCOUNTER — HOSPITAL ENCOUNTER (OUTPATIENT)
Facility: HOSPITAL | Age: 63
Discharge: HOME | End: 2018-07-18
Attending: SURGERY | Admitting: SURGERY
Payer: COMMERCIAL

## 2018-07-18 ENCOUNTER — SURGERY (OUTPATIENT)
Age: 63
End: 2018-07-18

## 2018-07-18 ENCOUNTER — ANESTHESIA (OUTPATIENT)
Dept: SURGERY | Facility: HOSPITAL | Age: 63
End: 2018-07-18
Payer: COMMERCIAL

## 2018-07-18 VITALS
SYSTOLIC BLOOD PRESSURE: 155 MMHG | HEIGHT: 67 IN | TEMPERATURE: 97.7 F | DIASTOLIC BLOOD PRESSURE: 98 MMHG | WEIGHT: 184 LBS | RESPIRATION RATE: 18 BRPM | BODY MASS INDEX: 28.88 KG/M2 | HEART RATE: 70 BPM | OXYGEN SATURATION: 96 %

## 2018-07-18 DIAGNOSIS — Z98.890 S/P LAPAROSCOPIC HERNIA REPAIR: Primary | ICD-10-CM

## 2018-07-18 DIAGNOSIS — Z87.19 S/P LAPAROSCOPIC HERNIA REPAIR: Primary | ICD-10-CM

## 2018-07-18 LAB — GLUCOSE BLDC GLUCOMTR-MCNC: 182 MG/DL (ref 70–99)

## 2018-07-18 PROCEDURE — 36000058 ZZH SURGERY LEVEL 3 EA 15 ADDTL MIN: Performed by: SURGERY

## 2018-07-18 PROCEDURE — 82962 GLUCOSE BLOOD TEST: CPT

## 2018-07-18 PROCEDURE — 49650 LAP ING HERNIA REPAIR INIT: CPT | Mod: 50 | Performed by: SURGERY

## 2018-07-18 PROCEDURE — 25000125 ZZHC RX 250: Performed by: NURSE ANESTHETIST, CERTIFIED REGISTERED

## 2018-07-18 PROCEDURE — 25000132 ZZH RX MED GY IP 250 OP 250 PS 637

## 2018-07-18 PROCEDURE — 25000128 H RX IP 250 OP 636: Performed by: NURSE ANESTHETIST, CERTIFIED REGISTERED

## 2018-07-18 PROCEDURE — 01999 UNLISTED ANES PROCEDURE: CPT | Performed by: NURSE ANESTHETIST, CERTIFIED REGISTERED

## 2018-07-18 PROCEDURE — 71000015 ZZH RECOVERY PHASE 1 LEVEL 2 EA ADDTL HR: Performed by: SURGERY

## 2018-07-18 PROCEDURE — 37000008 ZZH ANESTHESIA TECHNICAL FEE, 1ST 30 MIN: Performed by: SURGERY

## 2018-07-18 PROCEDURE — 71000027 ZZH RECOVERY PHASE 2 EACH 15 MINS: Performed by: SURGERY

## 2018-07-18 PROCEDURE — 27110028 ZZH OR GENERAL SUPPLY NON-STERILE: Performed by: SURGERY

## 2018-07-18 PROCEDURE — 25000128 H RX IP 250 OP 636: Performed by: SURGERY

## 2018-07-18 PROCEDURE — C1727 CATH, BAL TIS DIS, NON-VAS: HCPCS | Performed by: SURGERY

## 2018-07-18 PROCEDURE — 25000125 ZZHC RX 250: Performed by: SURGERY

## 2018-07-18 PROCEDURE — 40000306 ZZH STATISTIC PRE PROC ASSESS II: Performed by: SURGERY

## 2018-07-18 PROCEDURE — 25000566 ZZH SEVOFLURANE, EA 15 MIN: Performed by: NURSE ANESTHETIST, CERTIFIED REGISTERED

## 2018-07-18 PROCEDURE — 36000056 ZZH SURGERY LEVEL 3 1ST 30 MIN: Performed by: SURGERY

## 2018-07-18 PROCEDURE — C1781 MESH (IMPLANTABLE): HCPCS | Performed by: SURGERY

## 2018-07-18 PROCEDURE — 37000009 ZZH ANESTHESIA TECHNICAL FEE, EACH ADDTL 15 MIN: Performed by: SURGERY

## 2018-07-18 PROCEDURE — 71000014 ZZH RECOVERY PHASE 1 LEVEL 2 FIRST HR: Performed by: SURGERY

## 2018-07-18 PROCEDURE — 27210794 ZZH OR GENERAL SUPPLY STERILE: Performed by: SURGERY

## 2018-07-18 DEVICE — MESH-PROGRIP LAPAROSCOPIC 15X10CM: Type: IMPLANTABLE DEVICE | Site: INGUINAL | Status: FUNCTIONAL

## 2018-07-18 RX ORDER — ACETAMINOPHEN 325 MG/1
650 TABLET ORAL
Status: DISCONTINUED | OUTPATIENT
Start: 2018-07-18 | End: 2018-07-18 | Stop reason: HOSPADM

## 2018-07-18 RX ORDER — FENTANYL CITRATE 50 UG/ML
INJECTION, SOLUTION INTRAMUSCULAR; INTRAVENOUS PRN
Status: DISCONTINUED | OUTPATIENT
Start: 2018-07-18 | End: 2018-07-18

## 2018-07-18 RX ORDER — AMOXICILLIN 250 MG
1-2 CAPSULE ORAL 2 TIMES DAILY
Qty: 30 TABLET | Refills: 0 | Status: SHIPPED | OUTPATIENT
Start: 2018-07-18 | End: 2018-11-29

## 2018-07-18 RX ORDER — NALOXONE HYDROCHLORIDE 0.4 MG/ML
.1-.4 INJECTION, SOLUTION INTRAMUSCULAR; INTRAVENOUS; SUBCUTANEOUS
Status: DISCONTINUED | OUTPATIENT
Start: 2018-07-18 | End: 2018-07-18 | Stop reason: HOSPADM

## 2018-07-18 RX ORDER — SODIUM CHLORIDE, SODIUM LACTATE, POTASSIUM CHLORIDE, CALCIUM CHLORIDE 600; 310; 30; 20 MG/100ML; MG/100ML; MG/100ML; MG/100ML
INJECTION, SOLUTION INTRAVENOUS CONTINUOUS
Status: DISCONTINUED | OUTPATIENT
Start: 2018-07-18 | End: 2018-07-18 | Stop reason: HOSPADM

## 2018-07-18 RX ORDER — METOPROLOL TARTRATE 1 MG/ML
INJECTION, SOLUTION INTRAVENOUS PRN
Status: DISCONTINUED | OUTPATIENT
Start: 2018-07-18 | End: 2018-07-18

## 2018-07-18 RX ORDER — MEPERIDINE HYDROCHLORIDE 50 MG/ML
12.5 INJECTION INTRAMUSCULAR; INTRAVENOUS; SUBCUTANEOUS
Status: DISCONTINUED | OUTPATIENT
Start: 2018-07-18 | End: 2018-07-18 | Stop reason: HOSPADM

## 2018-07-18 RX ORDER — OXYCODONE HYDROCHLORIDE 5 MG/1
5 TABLET ORAL
Status: COMPLETED | OUTPATIENT
Start: 2018-07-18 | End: 2018-07-18

## 2018-07-18 RX ORDER — FENTANYL CITRATE 50 UG/ML
25-50 INJECTION, SOLUTION INTRAMUSCULAR; INTRAVENOUS
Status: DISCONTINUED | OUTPATIENT
Start: 2018-07-18 | End: 2018-07-18 | Stop reason: HOSPADM

## 2018-07-18 RX ORDER — PROPOFOL 10 MG/ML
INJECTION, EMULSION INTRAVENOUS PRN
Status: DISCONTINUED | OUTPATIENT
Start: 2018-07-18 | End: 2018-07-18

## 2018-07-18 RX ORDER — KETOROLAC TROMETHAMINE 30 MG/ML
30 INJECTION, SOLUTION INTRAMUSCULAR; INTRAVENOUS EVERY 6 HOURS PRN
Status: DISCONTINUED | OUTPATIENT
Start: 2018-07-18 | End: 2018-07-18 | Stop reason: HOSPADM

## 2018-07-18 RX ORDER — ONDANSETRON 2 MG/ML
4 INJECTION INTRAMUSCULAR; INTRAVENOUS EVERY 30 MIN PRN
Status: DISCONTINUED | OUTPATIENT
Start: 2018-07-18 | End: 2018-07-18 | Stop reason: HOSPADM

## 2018-07-18 RX ORDER — CEFAZOLIN SODIUM 1 G/3ML
1 INJECTION, POWDER, FOR SOLUTION INTRAMUSCULAR; INTRAVENOUS SEE ADMIN INSTRUCTIONS
Status: DISCONTINUED | OUTPATIENT
Start: 2018-07-18 | End: 2018-07-18 | Stop reason: HOSPADM

## 2018-07-18 RX ORDER — OXYCODONE HYDROCHLORIDE 5 MG/1
TABLET ORAL
Status: COMPLETED
Start: 2018-07-18 | End: 2018-07-18

## 2018-07-18 RX ORDER — NEOSTIGMINE METHYLSULFATE 1 MG/ML
VIAL (ML) INJECTION PRN
Status: DISCONTINUED | OUTPATIENT
Start: 2018-07-18 | End: 2018-07-18

## 2018-07-18 RX ORDER — CEFAZOLIN SODIUM 2 G/100ML
2 INJECTION, SOLUTION INTRAVENOUS
Status: COMPLETED | OUTPATIENT
Start: 2018-07-18 | End: 2018-07-18

## 2018-07-18 RX ORDER — OXYCODONE HYDROCHLORIDE 5 MG/1
5-10 TABLET ORAL EVERY 6 HOURS PRN
Qty: 20 TABLET | Refills: 0 | Status: SHIPPED | OUTPATIENT
Start: 2018-07-18 | End: 2018-11-29

## 2018-07-18 RX ORDER — ONDANSETRON 2 MG/ML
INJECTION INTRAMUSCULAR; INTRAVENOUS PRN
Status: DISCONTINUED | OUTPATIENT
Start: 2018-07-18 | End: 2018-07-18

## 2018-07-18 RX ORDER — BUPIVACAINE HYDROCHLORIDE 2.5 MG/ML
INJECTION, SOLUTION INFILTRATION; PERINEURAL PRN
Status: DISCONTINUED | OUTPATIENT
Start: 2018-07-18 | End: 2018-07-18 | Stop reason: HOSPADM

## 2018-07-18 RX ORDER — KETOROLAC TROMETHAMINE 30 MG/ML
INJECTION, SOLUTION INTRAMUSCULAR; INTRAVENOUS PRN
Status: DISCONTINUED | OUTPATIENT
Start: 2018-07-18 | End: 2018-07-18

## 2018-07-18 RX ORDER — ONDANSETRON 4 MG/1
4 TABLET, ORALLY DISINTEGRATING ORAL EVERY 30 MIN PRN
Status: DISCONTINUED | OUTPATIENT
Start: 2018-07-18 | End: 2018-07-18 | Stop reason: HOSPADM

## 2018-07-18 RX ORDER — GLYCOPYRROLATE 0.2 MG/ML
INJECTION, SOLUTION INTRAMUSCULAR; INTRAVENOUS PRN
Status: DISCONTINUED | OUTPATIENT
Start: 2018-07-18 | End: 2018-07-18

## 2018-07-18 RX ORDER — LIDOCAINE HYDROCHLORIDE 20 MG/ML
INJECTION, SOLUTION INFILTRATION; PERINEURAL PRN
Status: DISCONTINUED | OUTPATIENT
Start: 2018-07-18 | End: 2018-07-18

## 2018-07-18 RX ORDER — DEXAMETHASONE SODIUM PHOSPHATE 10 MG/ML
INJECTION, SOLUTION INTRAMUSCULAR; INTRAVENOUS PRN
Status: DISCONTINUED | OUTPATIENT
Start: 2018-07-18 | End: 2018-07-18

## 2018-07-18 RX ADMIN — ROCURONIUM BROMIDE 40 MG: 10 INJECTION INTRAVENOUS at 07:52

## 2018-07-18 RX ADMIN — CEFAZOLIN SODIUM 2 G: 2 INJECTION, SOLUTION INTRAVENOUS at 07:54

## 2018-07-18 RX ADMIN — METOPROLOL TARTRATE 2 MG: 5 INJECTION INTRAVENOUS at 08:39

## 2018-07-18 RX ADMIN — HYDROMORPHONE HYDROCHLORIDE 0.5 MG: 1 INJECTION, SOLUTION INTRAMUSCULAR; INTRAVENOUS; SUBCUTANEOUS at 08:08

## 2018-07-18 RX ADMIN — KETOROLAC TROMETHAMINE 30 MG: 30 INJECTION, SOLUTION INTRAMUSCULAR at 10:27

## 2018-07-18 RX ADMIN — CEFAZOLIN SODIUM 1 G: 2 INJECTION, SOLUTION INTRAVENOUS at 09:47

## 2018-07-18 RX ADMIN — HYDROMORPHONE HYDROCHLORIDE 0.5 MG: 1 INJECTION, SOLUTION INTRAMUSCULAR; INTRAVENOUS; SUBCUTANEOUS at 10:30

## 2018-07-18 RX ADMIN — DEXAMETHASONE SODIUM PHOSPHATE 10 MG: 10 INJECTION, SOLUTION INTRAMUSCULAR; INTRAVENOUS at 07:54

## 2018-07-18 RX ADMIN — FENTANYL CITRATE 50 MCG: 50 INJECTION, SOLUTION INTRAMUSCULAR; INTRAVENOUS at 08:02

## 2018-07-18 RX ADMIN — FENTANYL CITRATE 50 MCG: 50 INJECTION, SOLUTION INTRAMUSCULAR; INTRAVENOUS at 07:40

## 2018-07-18 RX ADMIN — OXYCODONE HYDROCHLORIDE 5 MG: 5 TABLET ORAL at 12:21

## 2018-07-18 RX ADMIN — PROPOFOL 40 MG: 10 INJECTION, EMULSION INTRAVENOUS at 08:41

## 2018-07-18 RX ADMIN — MIDAZOLAM 2 MG: 1 INJECTION INTRAMUSCULAR; INTRAVENOUS at 07:31

## 2018-07-18 RX ADMIN — HYDROMORPHONE HYDROCHLORIDE 0.5 MG: 1 INJECTION, SOLUTION INTRAMUSCULAR; INTRAVENOUS; SUBCUTANEOUS at 09:29

## 2018-07-18 RX ADMIN — METOPROLOL TARTRATE 1 MG: 5 INJECTION INTRAVENOUS at 08:31

## 2018-07-18 RX ADMIN — SODIUM CHLORIDE, POTASSIUM CHLORIDE, SODIUM LACTATE AND CALCIUM CHLORIDE: 600; 310; 30; 20 INJECTION, SOLUTION INTRAVENOUS at 09:24

## 2018-07-18 RX ADMIN — ONDANSETRON 4 MG: 2 INJECTION INTRAMUSCULAR; INTRAVENOUS at 09:19

## 2018-07-18 RX ADMIN — HYDROMORPHONE HYDROCHLORIDE 0.5 MG: 1 INJECTION, SOLUTION INTRAMUSCULAR; INTRAVENOUS; SUBCUTANEOUS at 08:32

## 2018-07-18 RX ADMIN — Medication 2.5 MG: at 10:27

## 2018-07-18 RX ADMIN — PROPOFOL 170 MG: 10 INJECTION, EMULSION INTRAVENOUS at 07:41

## 2018-07-18 RX ADMIN — PROCHLORPERAZINE EDISYLATE 10 MG: 5 INJECTION INTRAMUSCULAR; INTRAVENOUS at 14:29

## 2018-07-18 RX ADMIN — ROCURONIUM BROMIDE 10 MG: 10 INJECTION INTRAVENOUS at 08:06

## 2018-07-18 RX ADMIN — SODIUM CHLORIDE, POTASSIUM CHLORIDE, SODIUM LACTATE AND CALCIUM CHLORIDE: 600; 310; 30; 20 INJECTION, SOLUTION INTRAVENOUS at 07:15

## 2018-07-18 RX ADMIN — Medication 90 MG: at 07:42

## 2018-07-18 RX ADMIN — GLYCOPYRROLATE 0.5 MG: 0.2 INJECTION, SOLUTION INTRAMUSCULAR; INTRAVENOUS at 10:27

## 2018-07-18 RX ADMIN — BUPIVACAINE HYDROCHLORIDE 8 ML: 2.5 INJECTION, SOLUTION INFILTRATION; PERINEURAL at 10:31

## 2018-07-18 RX ADMIN — FENTANYL CITRATE 50 MCG: 50 INJECTION, SOLUTION INTRAMUSCULAR; INTRAVENOUS at 08:41

## 2018-07-18 RX ADMIN — LIDOCAINE HYDROCHLORIDE 80 MG: 20 INJECTION, SOLUTION INFILTRATION; PERINEURAL at 07:41

## 2018-07-18 RX ADMIN — METOPROLOL TARTRATE 1 MG: 5 INJECTION INTRAVENOUS at 08:10

## 2018-07-18 RX ADMIN — FENTANYL CITRATE 50 MCG: 50 INJECTION, SOLUTION INTRAMUSCULAR; INTRAVENOUS at 08:45

## 2018-07-18 RX ADMIN — ONDANSETRON 4 MG: 2 INJECTION INTRAMUSCULAR; INTRAVENOUS at 13:43

## 2018-07-18 RX ADMIN — METOPROLOL TARTRATE 1 MG: 5 INJECTION INTRAVENOUS at 08:35

## 2018-07-18 NOTE — OR NURSING
Dr winn aware of pt's voiding 100ml dark carlyn urine, and pt reports slight burning with urination, and pt's diaphoresis, and nausea earlier.

## 2018-07-18 NOTE — DISCHARGE INSTRUCTIONS
Post-Anesthesia Patient Instructions    IMMEDIATELY FOLLOWING SURGERY:  Do not drive or operate machinery for the first twenty four hours after surgery.  Do not make any important decisions for twenty four hours after surgery or while taking narcotic pain medications or sedatives.  If you develop intractable nausea and vomiting or a severe headache please notify your doctor immediately.    FOLLOW-UP:  Please make an appointment with your surgeon as instructed. You do not need to follow up with anesthesia unless specifically instructed to do so.    WOUND CARE INSTRUCTIONS (if applicable):  Keep a dry clean dressing on the anesthesia/puncture wound site if there is drainage.  Once the wound has quit draining you may leave it open to air.  Generally you should leave the bandage intact for twenty four hours unless there is drainage.  If the epidural site drains for more than 36-48 hours please call the anesthesia department.    QUESTIONS?:  Please feel free to call your physician or the hospital  if you have any questions, and they will be happy to assist you.       Post-Anesthesia Patient Instructions    IMMEDIATELY FOLLOWING SURGERY:  Do not drive or operate machinery for the first twenty four hours after surgery.  Do not make any important decisions for twenty four hours after surgery or while taking narcotic pain medications or sedatives.  If you develop intractable nausea and vomiting or a severe headache please notify your doctor immediately.    FOLLOW-UP:  Please make an appointment with your surgeon as instructed. You do not need to follow up with anesthesia unless specifically instructed to do so.    WOUND CARE INSTRUCTIONS (if applicable):  Keep a dry clean dressing on the anesthesia/puncture wound site if there is drainage.  Once the wound has quit draining you may leave it open to air.  Generally you should leave the bandage intact for twenty four hours unless there is drainage.  If the epidural  site drains for more than 36-48 hours please call the anesthesia department.    QUESTIONS?:  Please feel free to call your physician or the hospital  if you have any questions, and they will be happy to assist you.

## 2018-07-18 NOTE — ANESTHESIA CARE TRANSFER NOTE
Patient: Anthony Steele    Procedure(s):  LAPAROSCOPIC  INGUINAL HERNIA REPAIR, BILATERAL - Wound Class: I-Clean    Diagnosis: BILATERAL INGUINAL HERNIA  Diagnosis Additional Information: No value filed.    Anesthesia Type:   General, ETT     Note:  Airway :Nasal Cannula  Patient transferred to:PACU  Handoff Report: Identifed the Patient, Identified the Reponsible Provider, Reviewed the pertinent medical history, Discussed the surgical course, Reviewed Intra-OP anesthesia mangement and issues during anesthesia, Set expectations for post-procedure period and Allowed opportunity for questions and acknowledgement of understanding      Vitals: (Last set prior to Anesthesia Care Transfer)    CRNA VITALS  7/18/2018 1019 - 7/18/2018 1052      7/18/2018             Resp Rate (set): 8                Electronically Signed By: FABIOLA Tyson CRNA  July 18, 2018  10:52 AM

## 2018-07-18 NOTE — BRIEF OP NOTE
[facility] - Brief Operative Note    Pre-operative diagnosis: Symptomatic B/L inguinal hernias   Post-operative diagnosis B/L indirect inguinal hernias   Procedure: B/L Total extraperitoneal inguinal hernia repair   Surgeon: Louis Tomlin DO   Anesthesia: General    Estimated blood loss: 5 mL   Blood transfusion: No transfusion was given during surgery   Drains: 0   Specimens: None   Findings: B/L indirect inguinal hernia   Complications: None   Condition: Stable   Comments: Details included in dictated operative note.

## 2018-07-18 NOTE — OR NURSING
Patient returned to bed. Skin clammy and feels warm. Gown changed. Blood glucose 182. Moving okay. VSS

## 2018-07-18 NOTE — OP NOTE
Procedure Date: 07/18/2018      DATE OF PROCEDURE:  07/18/2018      PREOPERATIVE DIAGNOSIS:  Symptomatic bilateral inguinal hernias.      POSTOPERATIVE DIAGNOSIS:  Bilateral indirect inguinal hernias.      PROCEDURE PERFORMED:  Bilateral total extraperitoneal inguinal hernia repair with mesh.      INDICATION:  A 63-year-old gentleman, 1-year history of pain in both groins with activity.  Exam consistent with hernias, here for definitive surgical management.      SURGEON:  Louis Tomlin DO      WOUND TYPE:  I, clean.      DRAINS:  Zero.      DESCRIPTION OF PROCEDURE:  The patient was brought into the operating room and placed supine on the operating table.  After general endotracheal anesthesia was administered, the abdomen and groin were prepped and draped in the usual sterile fashion.  After preprocedural pause identifying the patient and procedure, position and antibiotics, local anesthetic was infiltrated to the left of the umbilicus, and a transverse incision was made and carried down to fascia using electrocautery and blunt dissection.  A 1 cm fasciotomy was made.  The rectus muscles were bluntly split apart.  The posterior sheath was identified and a balloon trocar then was introduced above the posterior sheath, below the muscles and advanced to the pubic symphysis.  Dissecting balloon then was inflated with 35 pumps.  This was maintained for 2 minutes for hemostasis.  The balloon was removed and pneumo-Retzius was established.  Two 5 mm working ports were placed under direct visualization suprapubically.  First, my attention turned towards the right side.  The space was dissected bluntly out to the lateral abdominal wall.  The peritoneum then was identified and was traced back with evidence of an indirect hernia.  This was meticulously dissected free from the spermatic cord.  The vas deferens was identified and protected throughout the entire case.  Once the peritoneum was sufficiently superior to the  region a 4 x 6 ProGrip mesh was cut to size and introduced and unfurled, covering the direct and indirect space and crossing midline.  There was no evidence of a direct hernia.  Next, my attention turned towards the left side and this as well was dissected out laterally using blunt dissection.  The hernia was considerably larger on this side and was indirect in nature.  The hernia sac then was reduced and dissected free from the spermatic cord.  Once sufficiently superior a 4 x 6 ProGrip mesh was cut to size and introduced and unfurled, covering the direct and indirect space and crossing midline as well.  Inspection of the operative bed revealed excellent hemostasis.  Pneumo-Retzius was released.  The 5 mm working ports were removed under direct visualization.  The periumbilical port then was removed.  Fascia was closed with interrupted 0 Vicryl suture.  Hemostasis was checked again and was excellent.  All skin incisions were closed with a running 4-0 Monocryl.  Steri-Strips were applied.  All counts were correct.  The patient tolerated the procedure well and taken to postanesthesia care unit.         SHON WELLER DO             D: 2018   T: 2018   MT: STEPHANIE      Name:     YESENIA ASHBY   MRN:      [id number]        Account:        [id number]   :      1955           Procedure Date: 2018      Document: [id number]

## 2018-07-18 NOTE — IP AVS SNAPSHOT
[facility] Preop/Phase II    [address] [address]    Phone:  [phone]                                       After Visit Summary   7/18/2018    Anthony Steele    MRN: [id number]           After Visit Summary Signature Page     I have received my discharge instructions, and my questions have been answered. I have discussed any challenges I see with this plan with the nurse or doctor.    ..........................................................................................................................................  Patient/Patient Representative Signature      ..........................................................................................................................................  Patient Representative Print Name and Relationship to Patient    ..................................................               ................................................  Date                                            Time    ..........................................................................................................................................  Reviewed by Signature/Title    ...................................................              ..............................................  Date                                                            Time

## 2018-07-18 NOTE — OR NURSING
"Pt in wheelchair to discharge to home, pt out of wheelchair per self back to bed, reports feeling \"nauseated.\"  Diaphoresis noted, cool cloths applied. bp 162/109, dr winn aware, pt to go home after he feels better.  "

## 2018-07-18 NOTE — OR NURSING
"Patient and responsible adult given discharge instructions with no questions regarding instructions. Tushar score 19/20. Pain level 0/10.  Discharged from unit via wheelchair. Patient discharged to home.  Pt reports feeling nauseated and \"sweaty\" once in car, moved to the back of the car via ambulation independently.  Pt reports does not want to come back to [facility], dr winn updated pt discharged to home.   "

## 2018-07-18 NOTE — IP AVS SNAPSHOT
MRN:[id number]                      After Visit Summary   7/18/2018    Anthony Steele    MRN: [id number]           Thank you!     Thank you for choosing [facility] for your care. Our goal is always to provide you with excellent care. Hearing back from our patients is one way we can continue to improve our services. Please take a few minutes to complete the written survey that you may receive in the mail after you visit with us. Thank you!        Patient Information     Date Of Birth          1955        About your hospital stay     You were admitted on:  July 18, 2018 You last received care in the:  [facility] Preop/Phase II    You were discharged on:  July 18, 2018       Who to Call     For medical emergencies, please call 911.  For non-urgent questions about your medical care, please call your primary care provider or clinic, [phone]  For questions related to your surgery, please call your surgery clinic        Attending Provider     Provider Specialty    Louis Tomlin, DO Surgery       Primary Care Provider Office Phone # Fax #    Abdulkadir Huizar [phone]      After Care Instructions     Discharge Instructions       Follow up appointment with Dr. Tomlin in 2 weeks for wound check.  Please don't hesitate to call my office with any questions or concerns.  Things to watch for are fevers greater than 101.3, pain uncontrolled by pain narcotics, deep red skin around the incision and puss coming out of the incision.            Do not - immerse incision in water until sutures removed       Do not immerse incision in water for two weeks.  No baths, hot tubs, pools, rivers, lakes, oceans, etc.            Dressing       Keep dressing clean and dry.  Dressing / incisional care as instructed by RN and or Surgeon            Ice to affected area       Ice to scotum            No driving or operating machinery        While taking pain narcotics.  Must be off pain narcotics for 24  hours and not be limited by pain before driving a vehicle or operating heavy equipment.            No lifting        No lifting over 10 lbs and no strenuous physical activity for 4 weeks            Shower       No shower for 48 hours post procedure. May shower Postoperative Day (POD)  #2.  At that time, the bandages may come off.  There are steri-stirps over the wounds.  Its okay to shower with these on, do not scrub.  Just let the soapy water run over the incisions and pat dry.  The steri-strips will fall off on their own in approximately 2 weeks.                  Your next 10 appointments already scheduled     Jul 31, 2018  9:00 AM CDT   (Arrive by 8:45 AM)   Post Op with Louis Tomlin,    [facility] ([facility] )    [address] [place] [address]   [phone]              Further instructions from your care team           Post-Anesthesia Patient Instructions    IMMEDIATELY FOLLOWING SURGERY:  Do not drive or operate machinery for the first twenty four hours after surgery.  Do not make any important decisions for twenty four hours after surgery or while taking narcotic pain medications or sedatives.  If you develop intractable nausea and vomiting or a severe headache please notify your doctor immediately.    FOLLOW-UP:  Please make an appointment with your surgeon as instructed. You do not need to follow up with anesthesia unless specifically instructed to do so.    WOUND CARE INSTRUCTIONS (if applicable):  Keep a dry clean dressing on the anesthesia/puncture wound site if there is drainage.  Once the wound has quit draining you may leave it open to air.  Generally you should leave the bandage intact for twenty four hours unless there is drainage.  If the epidural site drains for more than 36-48 hours please call the anesthesia department.    QUESTIONS?:  Please feel free to call your physician or the hospital  if you have any questions, and they will be  "happy to assist you.       Post-Anesthesia Patient Instructions    IMMEDIATELY FOLLOWING SURGERY:  Do not drive or operate machinery for the first twenty four hours after surgery.  Do not make any important decisions for twenty four hours after surgery or while taking narcotic pain medications or sedatives.  If you develop intractable nausea and vomiting or a severe headache please notify your doctor immediately.    FOLLOW-UP:  Please make an appointment with your surgeon as instructed. You do not need to follow up with anesthesia unless specifically instructed to do so.    WOUND CARE INSTRUCTIONS (if applicable):  Keep a dry clean dressing on the anesthesia/puncture wound site if there is drainage.  Once the wound has quit draining you may leave it open to air.  Generally you should leave the bandage intact for twenty four hours unless there is drainage.  If the epidural site drains for more than 36-48 hours please call the anesthesia department.    QUESTIONS?:  Please feel free to call your physician or the hospital  if you have any questions, and they will be happy to assist you.       Pending Results     No orders found from 7/16/2018 to 7/19/2018.            Admission Information     Date & Time Provider Department Dept. Phone    7/18/2018 Louis Tomlin,  [facility] Preop/Phase [phone]      Your Vitals Were     Blood Pressure Pulse Temperature Respirations Height Weight    165/109 70 97.7  F (36.5  C) (Temporal) 18 1.702 m (5' 7\") 83.5 kg (184 lb)    Pulse Oximetry BMI (Body Mass Index)                97% 28.82 kg/m2          Care EveryWhere ID     This is your Care EveryWhere ID. This could be used by other organizations to access your [place] medical records  [id number]        Equal Access to Services     [facility] AH: Fermin marquez Solana, waaxda luqadaha, qaybta kaalmada minda, devendra tolentino. So [facility] [phone].    ATENCIÓN: Si nusrat vazquez, " tiene a arita disposición servicios gratuitos de asistencia lingüística. Jeferson rivas [phone].    We comply with applicable federal civil rights laws and [place] laws. We do not discriminate on the basis of race, color, national origin, age, disability, sex, sexual orientation, or gender identity.               Review of your medicines      START taking        Dose / Directions    oxyCODONE IR 5 MG tablet   Commonly known as:  ROXICODONE   Used for:  S/P laparoscopic hernia repair        Dose:  5-10 mg   Take 1-2 tablets (5-10 mg) by mouth every 6 hours as needed for pain or other (Moderate to Severe)   Quantity:  20 tablet   Refills:  0       senna-docusate 8.6-50 MG per tablet   Commonly known as:  SENOKOT-S;PERICOLACE   Used for:  S/P laparoscopic hernia repair        Dose:  1-2 tablet   Take 1-2 tablets by mouth 2 times daily Take while on oral narcotics to prevent or treat constipation.   Quantity:  30 tablet   Refills:  0         CONTINUE these medicines which have NOT CHANGED        Dose / Directions    aspirin 325 MG tablet   Used for:  Coronary artery disease involving native coronary artery of native heart without angina pectoris        Dose:  325 mg   Take 1 tablet (325 mg) by mouth daily   Quantity:  180 tablet   Refills:  0       atorvastatin 40 MG tablet   Commonly known as:  LIPITOR   Used for:  Mixed hyperlipidemia        TAKE 1 TABLET DAILY BY MOUTH - GENERIC FOR LIPITOR   Quantity:  30 tablet   Refills:  6       buPROPion 300 MG 24 hr tablet   Commonly known as:  WELLBUTRIN XL   Used for:  Adjustment disorder with depressed mood        TAKE 1 TABLET DAILY BY MOUTH   Quantity:  90 tablet   Refills:  1       EPINEPHrine 0.3 MG/0.3ML injection 2-pack   Commonly known as:  EPIPEN/ADRENACLICK/or ANY BX GENERIC EQUIV   Used for:  Shellfish allergy        Dose:  0.3 mg   Inject 0.3 mLs (0.3 mg) into the muscle as needed for anaphylaxis   Quantity:  0.6 mL   Refills:  3       lisinopril 10 MG tablet    Commonly known as:  PRINIVIL/ZESTRIL   Used for:  Mixed hyperlipidemia        TAKE 1 TABLET BY MOUTH DAILY- GENERIC FOR ZESTRIL   Quantity:  90 tablet   Refills:  3       metoprolol tartrate 25 MG tablet   Commonly known as:  LOPRESSOR   Used for:  Essential hypertension with goal blood pressure less than 140/90        TAKE 1 TABLET BY MOUTH TWICE DAILY   Quantity:  180 tablet   Refills:  1       nitroGLYcerin 0.4 MG sublingual tablet   Commonly known as:  NITROSTAT   Used for:  Coronary artery disease involving native coronary artery of native heart without angina pectoris        PLACE 1 TABLET UNDER THE TONGUE EVERY FIVE MINUTES AS NEEDED FOR CHEST PAIN. DO NOT CRUSH; MAXIMUM OF 3 DOSES IN 15 MINUTES.   Quantity:  25 tablet   Refills:  5       OMEGA-3 FISH OIL PO        Dose:  1 g   Take 1 g by mouth   Refills:  0       omeprazole 40 MG capsule   Commonly known as:  priLOSEC   Used for:  Gastroesophageal reflux disease without esophagitis        TAKE 1 CAPSULE BY MOUTH ONCE DAILY. TAKE 30 TO 60 MINUTES BEFORE A MEAL.   Quantity:  90 capsule   Refills:  1            Where to get your medicines      These medications were sent to [id number] - [place] - [id number] [place] [address]     Phone:  [phone]     senna-docusate 8.6-50 MG per tablet         Some of these will need a paper prescription and others can be bought over the counter. Ask your nurse if you have questions.     Bring a paper prescription for each of these medications     oxyCODONE IR 5 MG tablet                Protect others around you: Learn how to safely use, store and throw away your medicines at www.disposemymeds.org.        Information about OPIOIDS     PRESCRIPTION OPIOIDS: WHAT YOU NEED TO KNOW   We gave you an opioid (narcotic) pain medicine. It is important to manage your pain, but opioids are not always the best choice. You should first try all the other options your care team gave you. Take  this medicine for as short a time (and as few doses) as possible.     These medicines have risks:    DO NOT drive when on new or higher doses of pain medicine. These medicines can affect your alertness and reaction times, and you could be arrested for driving under the influence (DUI). If you need to use opioids long-term, talk to your care team about driving.    DO NOT operate heave machinery    DO NOT do any other dangerous activities while taking these medicines.     DO NOT drink any alcohol while taking these medicines.      If the opioid prescribed includes acetaminophen, DO NOT take with any other medicines that contain acetaminophen. Read all labels carefully. Look for the word  acetaminophen  or  Tylenol.  Ask your pharmacist if you have questions or are unsure.    You can get addicted to pain medicines, especially if you have a history of addiction (chemical, alcohol or substance dependence). Talk to your care team about ways to reduce this risk.    Store your pills in a secure place, locked if possible. We will not replace any lost or stolen medicine. If you don t finish your medicine, please throw away (dispose) as directed by your pharmacist. The [place] [employer]ution [employer] has more information about safe disposal: https://www.pca.formerly [facility].[place].us/living-green/managing-unwanted-medications.     All opioids tend to cause constipation. Drink plenty of water and eat foods that have a lot of fiber, such as fruits, vegetables, prune juice, apple juice and high-fiber cereal. Take a laxative (Miralax, milk of magnesia, Colace, Senna) if you don t move your bowels at least every other day.              Medication List: This is a list of all your medications and when to take them. Check marks below indicate your daily home schedule. Keep this list as a reference.      Medications           Morning Afternoon Evening Bedtime As Needed    aspirin 325 MG tablet   Take 1 tablet (325 mg) by mouth daily                                 atorvastatin 40 MG tablet   Commonly known as:  LIPITOR   TAKE 1 TABLET DAILY BY MOUTH - GENERIC FOR LIPITOR                                buPROPion 300 MG 24 hr tablet   Commonly known as:  WELLBUTRIN XL   TAKE 1 TABLET DAILY BY MOUTH                                EPINEPHrine 0.3 MG/0.3ML injection 2-pack   Commonly known as:  EPIPEN/ADRENACLICK/or ANY BX GENERIC EQUIV   Inject 0.3 mLs (0.3 mg) into the muscle as needed for anaphylaxis                                lisinopril 10 MG tablet   Commonly known as:  PRINIVIL/ZESTRIL   TAKE 1 TABLET BY MOUTH DAILY- GENERIC FOR ZESTRIL                                metoprolol tartrate 25 MG tablet   Commonly known as:  LOPRESSOR   TAKE 1 TABLET BY MOUTH TWICE DAILY                                nitroGLYcerin 0.4 MG sublingual tablet   Commonly known as:  NITROSTAT   PLACE 1 TABLET UNDER THE TONGUE EVERY FIVE MINUTES AS NEEDED FOR CHEST PAIN. DO NOT CRUSH; MAXIMUM OF 3 DOSES IN 15 MINUTES.                                OMEGA-3 FISH OIL PO   Take 1 g by mouth                                omeprazole 40 MG capsule   Commonly known as:  priLOSEC   TAKE 1 CAPSULE BY MOUTH ONCE DAILY. TAKE 30 TO 60 MINUTES BEFORE A MEAL.                                oxyCODONE IR 5 MG tablet   Commonly known as:  ROXICODONE   Take 1-2 tablets (5-10 mg) by mouth every 6 hours as needed for pain or other (Moderate to Severe)   Last time this was given:  5 mg on 7/18/2018 12:21 PM                                senna-docusate 8.6-50 MG per tablet   Commonly known as:  SENOKOT-S;PERICOLACE   Take 1-2 tablets by mouth 2 times daily Take while on oral narcotics to prevent or treat constipation.

## 2018-07-18 NOTE — ANESTHESIA POSTPROCEDURE EVALUATION
Patient: Anthony Steele    Procedure(s):  LAPAROSCOPIC  INGUINAL HERNIA REPAIR, BILATERAL - Wound Class: I-Clean    Diagnosis:BILATERAL INGUINAL HERNIA  Diagnosis Additional Information: No value filed.    Anesthesia Type:  General, ETT    Note:  Anesthesia Post Evaluation    Patient location during evaluation: Phase 2  Patient participation: Able to fully participate in evaluation  Level of consciousness: awake and alert  Pain management: adequate  Airway patency: patent  Cardiovascular status: acceptable  Respiratory status: acceptable  Hydration status: acceptable  PONV: none             Last vitals:  Vitals:    07/18/18 1430 07/18/18 1435 07/18/18 1440   BP: 119/80     Pulse:      Resp:  16 16   Temp:      SpO2: 97%           Electronically Signed By: FABIOLA Frederick CRNA  July 18, 2018  3:09 PM

## 2018-07-18 NOTE — OR NURSING
Attempted to sit patient up on the edge of the bed, patient becomes nauseated and clammy. Received zofran IV with no relief. Notified anesthesia, in to see patient, compazine ordered and given IV. Dr. Ennis updated. Will be in to see patient. Vitals stable, states pain under control. Oxygen 97% on room air.

## 2018-07-19 NOTE — PHARMACY
Accessed the patient's chart to complete a medication order override report generated. Assessed and completed report.  Wanda BowersD.

## 2018-07-31 ENCOUNTER — OFFICE VISIT (OUTPATIENT)
Dept: SURGERY | Facility: OTHER | Age: 63
End: 2018-07-31
Attending: SURGERY
Payer: COMMERCIAL

## 2018-07-31 VITALS
OXYGEN SATURATION: 97 % | HEIGHT: 67 IN | RESPIRATION RATE: 15 BRPM | SYSTOLIC BLOOD PRESSURE: 122 MMHG | DIASTOLIC BLOOD PRESSURE: 80 MMHG | HEART RATE: 63 BPM | BODY MASS INDEX: 29.19 KG/M2 | TEMPERATURE: 98.2 F | WEIGHT: 186 LBS

## 2018-07-31 DIAGNOSIS — Z98.890 S/P LAPAROSCOPIC HERNIA REPAIR: Primary | ICD-10-CM

## 2018-07-31 DIAGNOSIS — Z87.19 S/P LAPAROSCOPIC HERNIA REPAIR: Primary | ICD-10-CM

## 2018-07-31 PROCEDURE — 99024 POSTOP FOLLOW-UP VISIT: CPT | Performed by: SURGERY

## 2018-07-31 ASSESSMENT — PAIN SCALES - GENERAL: PAINLEVEL: MILD PAIN (3)

## 2018-07-31 NOTE — MR AVS SNAPSHOT
After Visit Summary   7/31/2018    Anthony Steele    MRN: [id number]           Patient Information     Date Of Birth          1955        Visit Information        Provider Department      7/31/2018 9:00 AM Louis Tomlin,  [facility]        Today's Diagnoses     S/P laparoscopic hernia repair    -  1      Care Instructions    Thank you for allowing Dr. Tomlin and our surgical team to participate in your care.  If you have a scheduling or an appointment question please contact Roro, our Health Unit Coordinator at her direct line [phone].   ALL nursing questions or concerns can be directed to your surgical nurse at: [phone]-[place]            Follow-ups after your visit        Your next 10 appointments already scheduled     Aug 28, 2018 11:00 AM CDT   (Arrive by 10:45 AM)   Post Op with Louis Tomlin DO   [facility] ([facility] [place] )    [address]  [place] [address]   [phone]              Who to contact     If you have questions or need follow up information about today's clinic visit or your schedule please contact [facility] directly at [phone].  Normal or non-critical lab and imaging results will be communicated to you by MyChart, letter or phone within 4 business days after the clinic has received the results. If you do not hear from us within 7 days, please contact the clinic through MyChart or phone. If you have a critical or abnormal lab result, we will notify you by phone as soon as possible.  Submit refill requests through [employer] or call your pharmacy and they will forward the refill request to us. Please allow 3 business days for your refill to be completed.          Additional Information About Your Visit        Care EveryWhere ID     This is your Care EveryWhere ID. This could be used by other organizations to access your [place] medical records  [id number]        Your Vitals Were   "   Pulse Temperature Respirations Height Pulse Oximetry BMI (Body Mass Index)    63 98.2  F (36.8  C) (Tympanic) 15 5' 7\" (1.702 m) 97% 29.13 kg/m2       Blood Pressure from Last 3 Encounters:   07/31/18 122/80   07/18/18 155/98   07/13/18 106/68    Weight from Last 3 Encounters:   07/31/18 186 lb (84.4 kg)   07/18/18 184 lb (83.5 kg)   07/13/18 186 lb 6.4 oz (84.6 kg)              Today, you had the following     No orders found for display       Primary Care Provider Office Phone # Fax #    Abdulkadir Huizar [phone] [address] [address]        Equal Access to Services     VALERIE CROWE : Fermin marquez Solana, waaxda luqadaha, qaybta kaalmada adeegyameera, devendra mustafa . So [place] [phone].    ATENCIÓN: Si habla español, tiene a arita disposición servicios gratuitos de asistencia lingüística. Llame al [phone].    We comply with applicable federal civil rights laws and [place] laws. We do not discriminate on the basis of race, color, national origin, age, disability, sex, sexual orientation, or gender identity.            Thank you!     Thank you for choosing [facility]  for your care. Our goal is always to provide you with excellent care. Hearing back from our patients is one way we can continue to improve our services. Please take a few minutes to complete the written survey that you may receive in the mail after your visit with us. Thank you!             Your Updated Medication List - Protect others around you: Learn how to safely use, store and throw away your medicines at www.disposemymeds.org.          This list is accurate as of 7/31/18 10:48 AM.  Always use your most recent med list.                   Brand Name Dispense Instructions for use Diagnosis    aspirin 325 MG tablet     180 tablet    Take 1 tablet (325 mg) by mouth daily    Coronary artery disease involving native coronary artery of native heart without " angina pectoris       atorvastatin 40 MG tablet    LIPITOR    30 tablet    TAKE 1 TABLET DAILY BY MOUTH - GENERIC FOR LIPITOR    Mixed hyperlipidemia       buPROPion 300 MG 24 hr tablet    WELLBUTRIN XL    90 tablet    TAKE 1 TABLET DAILY BY MOUTH    Adjustment disorder with depressed mood       EPINEPHrine 0.3 MG/0.3ML injection 2-pack    EPIPEN/ADRENACLICK/or ANY BX GENERIC EQUIV    0.6 mL    Inject 0.3 mLs (0.3 mg) into the muscle as needed for anaphylaxis    Shellfish allergy       lisinopril 10 MG tablet    PRINIVIL/ZESTRIL    90 tablet    TAKE 1 TABLET BY MOUTH DAILY- GENERIC FOR ZESTRIL    Mixed hyperlipidemia       metoprolol tartrate 25 MG tablet    LOPRESSOR    180 tablet    TAKE 1 TABLET BY MOUTH TWICE DAILY    Essential hypertension with goal blood pressure less than 140/90       nitroGLYcerin 0.4 MG sublingual tablet    NITROSTAT    25 tablet    PLACE 1 TABLET UNDER THE TONGUE EVERY FIVE MINUTES AS NEEDED FOR CHEST PAIN. DO NOT CRUSH; MAXIMUM OF 3 DOSES IN 15 MINUTES.    Coronary artery disease involving native coronary artery of native heart without angina pectoris       OMEGA-3 FISH OIL PO      Take 1 g by mouth        omeprazole 40 MG capsule    priLOSEC    90 capsule    TAKE 1 CAPSULE BY MOUTH ONCE DAILY. TAKE 30 TO 60 MINUTES BEFORE A MEAL.    Gastroesophageal reflux disease without esophagitis       oxyCODONE IR 5 MG tablet    ROXICODONE    20 tablet    Take 1-2 tablets (5-10 mg) by mouth every 6 hours as needed for pain or other (Moderate to Severe)    S/P laparoscopic hernia repair       senna-docusate 8.6-50 MG per tablet    SENOKOT-S;PERICOLACE    30 tablet    Take 1-2 tablets by mouth 2 times daily Take while on oral narcotics to prevent or treat constipation.    S/P laparoscopic hernia repair

## 2018-07-31 NOTE — NURSING NOTE
"Chief Complaint   Patient presents with     Surgical Followup     7-  bilateral inguinal hernia repair, paperwork to be reviewed continue leave from work       Initial /80 (BP Location: Right arm, Patient Position: Sitting, Cuff Size: Adult Large)  Pulse 63  Temp 98.2  F (36.8  C) (Tympanic)  Resp 15  Ht 5' 7\" (1.702 m)  Wt 186 lb (84.4 kg)  SpO2 97%  BMI 29.13 kg/m2 Estimated body mass index is 29.13 kg/(m^2) as calculated from the following:    Height as of this encounter: 5' 7\" (1.702 m).    Weight as of this encounter: 186 lb (84.4 kg).  Medication Reconciliation: complete    Alicia Pacheco LPN    "

## 2018-07-31 NOTE — PROGRESS NOTES
"S: Mr. Steele returns two weeks after TEP for bilateral indirect inguinal hernias.  Doing well post operatively, tolerating a general diet, having regular bowel movements, passing flatus, no diarrhea, doesn't take pain narcotics regularly.  Specifically denies fevers, chills, nausea, vomiting, shortness of breath. He does mention some testicular swelling    O:  /80 (BP Location: Right arm, Patient Position: Sitting, Cuff Size: Adult Large)  Pulse 63  Temp 98.2  F (36.8  C) (Tympanic)  Resp 15  Ht 5' 7\" (1.702 m)  Wt 186 lb (84.4 kg)  SpO2 97%  BMI 29.13 kg/m2  Gen: AAOx4, NAD WN/WD ambulating without assistance, smiling, joking  Abd: Soft, ND/NT no rebound, no guarding  Groin: no masses, B/L scrotal swelling without ecchymosis  Wound: clean, dry, intact, no erythema, necrosis or drainage worrisome for infection    A/P  #1 S/P laparoscopic TEP for B/L inguinal hernias    Mr. Steele looks wonderful. The repair appears to be good, no evidence of recurrence.  The swelling I'm assuming is because of the two meshes.  I'll bring back in a month to re-evaluate.  I've provided my card with office phone number and told him not to hesitate to call with any questions, comments or concerns.  I'd like a call if he has any fevers over 101.3, nausea/vomiting, pain out of control.  He's been instructed to refrain from lifting greater than 10 lbs (or a gallon of milk) for another two weeks and to avoid strenuous activity as well.  "

## 2018-07-31 NOTE — PATIENT INSTRUCTIONS
Thank you for allowing Dr. Tomlin and our surgical team to participate in your care.  If you have a scheduling or an appointment question please contact Roro, our Health Unit Coordinator at her direct line [phone].   ALL nursing questions or concerns can be directed to your surgical nurse at: [phone]-[place]

## 2018-08-28 ENCOUNTER — OFFICE VISIT (OUTPATIENT)
Dept: SURGERY | Facility: OTHER | Age: 63
End: 2018-08-28
Attending: SURGERY
Payer: COMMERCIAL

## 2018-08-28 VITALS
DIASTOLIC BLOOD PRESSURE: 76 MMHG | WEIGHT: 186 LBS | HEART RATE: 64 BPM | TEMPERATURE: 97.1 F | OXYGEN SATURATION: 98 % | SYSTOLIC BLOOD PRESSURE: 120 MMHG | BODY MASS INDEX: 29.19 KG/M2 | HEIGHT: 67 IN

## 2018-08-28 DIAGNOSIS — Z87.19 S/P LAPAROSCOPIC HERNIA REPAIR: Primary | ICD-10-CM

## 2018-08-28 DIAGNOSIS — Z98.890 S/P LAPAROSCOPIC HERNIA REPAIR: Primary | ICD-10-CM

## 2018-08-28 DIAGNOSIS — N50.89 SWELLING OF THE TESTICLES: ICD-10-CM

## 2018-08-28 PROBLEM — Z98.1 S/P CERVICAL SPINAL FUSION: Status: ACTIVE | Noted: 2018-08-28

## 2018-08-28 PROBLEM — I21.3 STEMI (ST ELEVATION MYOCARDIAL INFARCTION) (H): Status: ACTIVE | Noted: 2018-08-28

## 2018-08-28 PROBLEM — Z82.49 FAMILY HISTORY OF PREMATURE CORONARY ARTERY DISEASE: Status: ACTIVE | Noted: 2018-08-28

## 2018-08-28 PROBLEM — R73.9 HYPERGLYCEMIA: Status: ACTIVE | Noted: 2018-08-28

## 2018-08-28 PROCEDURE — 99024 POSTOP FOLLOW-UP VISIT: CPT | Performed by: SURGERY

## 2018-08-28 ASSESSMENT — PAIN SCALES - GENERAL: PAINLEVEL: NO PAIN (0)

## 2018-08-28 NOTE — NURSING NOTE
"Chief Complaint   Patient presents with     Surgical Followup     s/p-Bilateral inguinal hernia repair 7/18/18       Initial /76 (BP Location: Right arm, Patient Position: Chair, Cuff Size: Adult Regular)  Pulse 64  Temp 97.1  F (36.2  C) (Tympanic)  Ht 5' 7\" (1.702 m)  Wt 186 lb (84.4 kg)  SpO2 98%  BMI 29.13 kg/m2 Estimated body mass index is 29.13 kg/(m^2) as calculated from the following:    Height as of this encounter: 5' 7\" (1.702 m).    Weight as of this encounter: 186 lb (84.4 kg).  Medication Reconciliation: complete    ALFREDITO SMITH LPN    "

## 2018-08-28 NOTE — PATIENT INSTRUCTIONS
Thank you for allowing Dr. Tomlin and our surgical team to participate in your care.  If you have a scheduling or an appointment question please contact Roro, our Health Unit Coordinator at her direct line [phone].   ALL nursing questions or concerns can be directed to your surgical nurse at: [phone]-[place]

## 2018-08-28 NOTE — PROGRESS NOTES
"S: Mr. Steele returns five weeks after laparoscopic inguinal hernia repair B/L.  Doing okay post operatively, tolerating a general diet, having regular bowel movements, passing flatus, no diarrhea, doesn't take pain narcotics regularly.  Still complaints of occasional fullness with discomfort about the testicles that is worse with activity and better with laying down.  This doesn't happen every day. Specifically denies fevers, chills, nausea, vomiting, shortness of breath.    O:  /76 (BP Location: Right arm, Patient Position: Chair, Cuff Size: Adult Regular)  Pulse 64  Temp 97.1  F (36.2  C) (Tympanic)  Ht 5' 7\" (1.702 m)  Wt 186 lb (84.4 kg)  SpO2 98%  BMI 29.13 kg/m2  Gen: AAOx4, NAD WN/WD ambulating without assistance, smiling, joking  Abd: Soft, ND/NT no rebound, no guarding  : swollen tender testicles B/L  Wound: clean, dry, intact, no erythema, necrosis or drainage worrisome for infection    A/P  #1 S/P laparoscopic laparoscopic inguinal hernia repair    He's still having discomfort and swelling.  We are going to try more supportive clothing with jock straps, elevated when sitting down with a towel roll.  Will see back in a month.  If still an issue will get ultrasound  "

## 2018-08-28 NOTE — MR AVS SNAPSHOT
After Visit Summary   8/28/2018    Anthony Steele    MRN: [id number]           Patient Information     Date Of Birth          1955        Visit Information        Provider Department      8/28/2018 11:00 AM Louis Tomlin,  [facility]        Today's Diagnoses     S/P laparoscopic hernia repair    -  1    Swelling of the testicles          Care Instructions    Thank you for allowing Dr. Tomlin and our surgical team to participate in your care.  If you have a scheduling or an appointment question please contact Roro, our Health Unit Coordinator at her direct line [phone].   ALL nursing questions or concerns can be directed to your surgical nurse at: [phone]-ash[place]          Follow-ups after your visit        Your next 10 appointments already scheduled     Oct 02, 2018  9:00 AM CDT   (Arrive by 8:45 AM)   Post Op with Louis Tomlin DO   [facility] ([facility] [place] [facility] [place] )    [address]  [place] [address]   [phone]              Who to contact     If you have questions or need follow up information about today's clinic visit or your schedule please contact [facility] directly at [phone].  Normal or non-critical lab and imaging results will be communicated to you by MyChart, letter or phone within 4 business days after the clinic has received the results. If you do not hear from us within 7 days, please contact the clinic through MyChart or phone. If you have a critical or abnormal lab result, we will notify you by phone as soon as possible.  Submit refill requests through [employer] or call your pharmacy and they will forward the refill request to us. Please allow 3 business days for your refill to be completed.          Additional Information About Your Visit        Care EveryWhere ID     This is your Care EveryWhere ID. This could be used by other organizations to access your [facility]  "records  [id number]        Your Vitals Were     Pulse Temperature Height Pulse Oximetry BMI (Body Mass Index)       64 97.1  F (36.2  C) (Tympanic) 5' 7\" (1.702 m) 98% 29.13 kg/m2        Blood Pressure from Last 3 Encounters:   08/28/18 120/76   07/31/18 122/80   07/18/18 155/98    Weight from Last 3 Encounters:   08/28/18 186 lb (84.4 kg)   07/31/18 186 lb (84.4 kg)   07/18/18 184 lb (83.5 kg)              Today, you had the following     No orders found for display       Primary Care Provider Office Phone # Fax #    Abdulkadir Huizar [phone] [address] [facility] [address]        Equal Access to Services     [facility] : Fermin marquez Solana, waaxda luqadaha, qaybta kaalmada minda, devendra mustafa . So [facility] [phone].    ATENCIÓN: Si habla español, tiene a arita disposición servicios gratuitos de asistencia lingüística. Jeferson al [phone].    We comply with applicable federal civil rights laws and [place] laws. We do not discriminate on the basis of race, color, national origin, age, disability, sex, sexual orientation, or gender identity.            Thank you!     Thank you for choosing [facility]  for your care. Our goal is always to provide you with excellent care. Hearing back from our patients is one way we can continue to improve our services. Please take a few minutes to complete the written survey that you may receive in the mail after your visit with us. Thank you!             Your Updated Medication List - Protect others around you: Learn how to safely use, store and throw away your medicines at www.disposemymeds.org.          This list is accurate as of 8/28/18 11:31 AM.  Always use your most recent med list.                   Brand Name Dispense Instructions for use Diagnosis    aspirin 325 MG tablet     180 tablet    Take 1 tablet (325 mg) by mouth daily    Coronary artery disease involving native coronary " artery of native heart without angina pectoris       atorvastatin 40 MG tablet    LIPITOR    30 tablet    TAKE 1 TABLET DAILY BY MOUTH - GENERIC FOR LIPITOR    Mixed hyperlipidemia       buPROPion 300 MG 24 hr tablet    WELLBUTRIN XL    90 tablet    TAKE 1 TABLET DAILY BY MOUTH    Adjustment disorder with depressed mood       EPINEPHrine 0.3 MG/0.3ML injection 2-pack    EPIPEN/ADRENACLICK/or ANY BX GENERIC EQUIV    0.6 mL    Inject 0.3 mLs (0.3 mg) into the muscle as needed for anaphylaxis    Shellfish allergy       lisinopril 10 MG tablet    PRINIVIL/ZESTRIL    90 tablet    TAKE 1 TABLET BY MOUTH DAILY- GENERIC FOR ZESTRIL    Mixed hyperlipidemia       metoprolol tartrate 25 MG tablet    LOPRESSOR    180 tablet    TAKE 1 TABLET BY MOUTH TWICE DAILY    Essential hypertension with goal blood pressure less than 140/90       nitroGLYcerin 0.4 MG sublingual tablet    NITROSTAT    25 tablet    PLACE 1 TABLET UNDER THE TONGUE EVERY FIVE MINUTES AS NEEDED FOR CHEST PAIN. DO NOT CRUSH; MAXIMUM OF 3 DOSES IN 15 MINUTES.    Coronary artery disease involving native coronary artery of native heart without angina pectoris       OMEGA-3 FISH OIL PO      Take 1 g by mouth        omeprazole 40 MG capsule    priLOSEC    90 capsule    TAKE 1 CAPSULE BY MOUTH ONCE DAILY. TAKE 30 TO 60 MINUTES BEFORE A MEAL.    Gastroesophageal reflux disease without esophagitis       oxyCODONE IR 5 MG tablet    ROXICODONE    20 tablet    Take 1-2 tablets (5-10 mg) by mouth every 6 hours as needed for pain or other (Moderate to Severe)    S/P laparoscopic hernia repair       senna-docusate 8.6-50 MG per tablet    SENOKOT-S;PERICOLACE    30 tablet    Take 1-2 tablets by mouth 2 times daily Take while on oral narcotics to prevent or treat constipation.    S/P laparoscopic hernia repair

## 2018-09-12 ENCOUNTER — TELEPHONE (OUTPATIENT)
Dept: SURGERY | Facility: OTHER | Age: 63
End: 2018-09-12

## 2018-09-12 NOTE — TELEPHONE ENCOUNTER
Discussed with Dr. Tomlin. Patient can return to work now if ready, if he is continuing to have pain and swelling, then he should be off until his appointment.

## 2018-09-12 NOTE — TELEPHONE ENCOUNTER
When he can work without discomfort.  If he was still having pain, swelling after a month, then I was going to get an ultrasound.

## 2018-09-12 NOTE — TELEPHONE ENCOUNTER
8:23 AM    Reason for Call: Phone Call    Description: Berna from [facility] called and stated they need to know pt's return to work date from his disability. She stated they had a return to work date for sometime in August, but pt stated that was incorrect. Please call her back at [phone]    Was an appointment offered for this call? No  If yes : Appointment type              Date    Preferred method for responding to this message: Telephone Call  What is your phone number ?    If we cannot reach you directly, may we leave a detailed response at the number you provided? Yes    Can this message wait until your PCP/provider returns, if available today? YES, aware provider out today    Cece Espinoza

## 2018-09-12 NOTE — TELEPHONE ENCOUNTER
This patient had bilateral laparoscopic inguinal hernia repair on 7/18. He was last seen in the office 8/28/18 and per your note was still having discomfort and swelling.  He was advised to try more supportive clothing with jock straps, elevated when sitting down with a towel roll.  He was to return in a month and if still having an issue get an ultrasound. His next appointment with you is 10/3/18.    His short term disability company wants to know when his return to work date is. His paperwork states end of August, but patient has told them that is not correct. I try to call patient to ask when patient thinks he is to return to work, but he did not answer so I left a message to call back.    When is this patient to return to work?

## 2018-09-19 NOTE — TELEPHONE ENCOUNTER
Left message on home and cell numbers to return call. There is a signed release in chart to disclose information with [facility] once patient returns call.

## 2018-09-20 NOTE — TELEPHONE ENCOUNTER
Patient left message to call [facility]. Called patient and left message that more information from him is needed to determine return to work date. See earlier message from Dr. Tomlin in this encounter.

## 2018-09-24 NOTE — TELEPHONE ENCOUNTER
"Called Lily at [facility] [employer] at [phone] with claim # [id number] to update that the patient has not yet returned to work due to ongoing pain and that Dr. Tomlin will re-evaluate at next visit 10/3/18. She has requested that the telephone documentation and last office [facility] [phone]. Patient gave verbal consent on last phone call to release \"any and all\" records needed and there is also a signed release in chart.    "

## 2018-09-24 NOTE — TELEPHONE ENCOUNTER
"Discussed with patient. He is no longer having swelling, but still has a lot of discomfort and moderate pain that he rates as a \"5\" out of 10 in testicular area off and on and with activity. He does not feel ready to return to work and will reevaluate at his appointment.   "

## 2018-10-02 ENCOUNTER — OFFICE VISIT (OUTPATIENT)
Dept: SURGERY | Facility: OTHER | Age: 63
End: 2018-10-02
Attending: SURGERY
Payer: COMMERCIAL

## 2018-10-02 VITALS
SYSTOLIC BLOOD PRESSURE: 128 MMHG | TEMPERATURE: 97.7 F | OXYGEN SATURATION: 98 % | BODY MASS INDEX: 29.13 KG/M2 | DIASTOLIC BLOOD PRESSURE: 80 MMHG | HEART RATE: 75 BPM | WEIGHT: 186 LBS

## 2018-10-02 DIAGNOSIS — Z87.19 S/P HERNIA SURGERY: Primary | ICD-10-CM

## 2018-10-02 DIAGNOSIS — N50.89 SCROTAL SWELLING: ICD-10-CM

## 2018-10-02 DIAGNOSIS — Z98.890 S/P HERNIA SURGERY: Primary | ICD-10-CM

## 2018-10-02 PROCEDURE — 99024 POSTOP FOLLOW-UP VISIT: CPT | Performed by: SURGERY

## 2018-10-02 ASSESSMENT — PAIN SCALES - GENERAL: PAINLEVEL: NO PAIN (0)

## 2018-10-02 NOTE — PROGRESS NOTES
S: Last seen on 8/28/18 for testicular pain and swelling after Bilateral TEP on 7/18/18. He was instructed to wear supportive clothing like jock strap and elevate and was kept from returning to work because of the pain.  States the pain and swelling are much improved.  Occasionally he'll get a lower abdominal pain at the end of the day if he does a lot of twisting and over does it with the work.  This is relieved with ibuprofen and resolves by the next day.  The testicular swelling has improved, but feels it comes back occasionally at the end of the day as well, but by morning is normal. He feels like he is able to return to work.         O:  /80 (BP Location: Right arm, Patient Position: Chair, Cuff Size: Adult Regular)  Pulse 75  Temp 97.7  F (36.5  C) (Tympanic)  Wt 186 lb (84.4 kg)  SpO2 98%  BMI 29.13 kg/m2   Gen: AAOx4, NAD, non toxic  Abd: soft, ND/NT no rebound, no guarding, no right inguinal mass or pain, no left inguinal mass or pain  : posterior fullness of both hemiscrotum without pain    A/P  #1 S/P B/L TEP  #2 Testicular swelling    He's definitely improved.  I can now get a good thorough inguinal exam that doesn't cause pain and there's no mass with cough.  There still feels like there's fluid in the scrotum.  I offered to order an ultrasound to look to characterize the fluid and check the vasculature.  He declined as he states he feels better and would like to go to work.  After 2.5 months, I think this is reasonable.  He'll return PRN.

## 2018-10-02 NOTE — LETTER
[facility] [place]  [address]  [place] [address]  [phone]          October 2, 2018    RE:  Anthony Steele                                                                                                                                                       [address]  [place] [address]            To whom it may concern:    Mr. Steele is under my professional care for surgical issue. He  may return to work with no restrictions starting on 10/8/18    Sincerely,        Louis Tomlin DO

## 2018-10-02 NOTE — MR AVS SNAPSHOT
After Visit Summary   10/2/2018    Anthony Steele    MRN: [id number]           Patient Information     Date Of Birth          1955        Visit Information        Provider Department      10/2/2018 9:00 AM Louis Tomlin, DO [facility]         Follow-ups after your visit        Who to contact     If you have questions or need follow up information about today's clinic visit or your schedule please contact [facility] directly at [phone].  Normal or non-critical lab and imaging results will be communicated to you by MyChart, letter or phone within 4 business days after the clinic has received the results. If you do not hear from us within 7 days, please contact the clinic through MyChart or phone. If you have a critical or abnormal lab result, we will notify you by phone as soon as possible.  Submit refill requests through [employer] or call your pharmacy and they will forward the refill request to us. Please allow 3 business days for your refill to be completed.          Additional Information About Your Visit        Care EveryWhere ID     This is your Care EveryWhere ID. This could be used by other organizations to access your [facility] medical records  [id number]        Your Vitals Were     Pulse Temperature Pulse Oximetry BMI (Body Mass Index)          75 97.7  F (36.5  C) (Tympanic) 98% 29.13 kg/m2         Blood Pressure from Last 3 Encounters:   10/02/18 128/80   08/28/18 120/76   07/31/18 122/80    Weight from Last 3 Encounters:   10/02/18 186 lb (84.4 kg)   08/28/18 186 lb (84.4 kg)   07/31/18 186 lb (84.4 kg)              Today, you had the following     No orders found for display       Primary Care Provider Office Phone # Fax #    Abdulkadir Huizar [phone]       [facility] [address]        Equal Access to Services     VALERIE CROWE AH: Fermin Bowser, denise hardin, flynn gonzalez  devendra perlacheli juavel stone'aan ah. So [facility] [phone].    ATENCIÓN: Si nusrat vazquez, tiene a arita disposición servicios gratuitos de asistencia lingüística. Jeferson al [phone].    We comply with applicable federal civil rights laws and [place] laws. We do not discriminate on the basis of race, color, national origin, age, disability, sex, sexual orientation, or gender identity.            Thank you!     Thank you for choosing [facility]  for your care. Our goal is always to provide you with excellent care. Hearing back from our patients is one way we can continue to improve our services. Please take a few minutes to complete the written survey that you may receive in the mail after your visit with us. Thank you!             Your Updated Medication List - Protect others around you: Learn how to safely use, store and throw away your medicines at www.disposemymeds.org.          This list is accurate as of 10/2/18  9:08 AM.  Always use your most recent med list.                   Brand Name Dispense Instructions for use Diagnosis    aspirin 325 MG tablet     180 tablet    Take 1 tablet (325 mg) by mouth daily    Coronary artery disease involving native coronary artery of native heart without angina pectoris       atorvastatin 40 MG tablet    LIPITOR    30 tablet    TAKE 1 TABLET DAILY BY MOUTH - GENERIC FOR LIPITOR    Mixed hyperlipidemia       buPROPion 300 MG 24 hr tablet    WELLBUTRIN XL    90 tablet    TAKE 1 TABLET DAILY BY MOUTH    Adjustment disorder with depressed mood       EPINEPHrine 0.3 MG/0.3ML injection 2-pack    EPIPEN/ADRENACLICK/or ANY BX GENERIC EQUIV    0.6 mL    Inject 0.3 mLs (0.3 mg) into the muscle as needed for anaphylaxis    Shellfish allergy       lisinopril 10 MG tablet    PRINIVIL/ZESTRIL    90 tablet    TAKE 1 TABLET BY MOUTH DAILY- GENERIC FOR ZESTRIL    Mixed hyperlipidemia       metoprolol tartrate 25 MG tablet    LOPRESSOR    180 tablet     TAKE 1 TABLET BY MOUTH TWICE DAILY    Essential hypertension with goal blood pressure less than 140/90       nitroGLYcerin 0.4 MG sublingual tablet    NITROSTAT    25 tablet    PLACE 1 TABLET UNDER THE TONGUE EVERY FIVE MINUTES AS NEEDED FOR CHEST PAIN. DO NOT CRUSH; MAXIMUM OF 3 DOSES IN 15 MINUTES.    Coronary artery disease involving native coronary artery of native heart without angina pectoris       OMEGA-3 FISH OIL PO      Take 1 g by mouth        omeprazole 40 MG capsule    priLOSEC    90 capsule    TAKE 1 CAPSULE BY MOUTH ONCE DAILY. TAKE 30 TO 60 MINUTES BEFORE A MEAL.    Gastroesophageal reflux disease without esophagitis       oxyCODONE IR 5 MG tablet    ROXICODONE    20 tablet    Take 1-2 tablets (5-10 mg) by mouth every 6 hours as needed for pain or other (Moderate to Severe)    S/P laparoscopic hernia repair       senna-docusate 8.6-50 MG per tablet    SENOKOT-S;PERICOLACE    30 tablet    Take 1-2 tablets by mouth 2 times daily Take while on oral narcotics to prevent or treat constipation.    S/P laparoscopic hernia repair

## 2018-10-02 NOTE — NURSING NOTE
"Chief Complaint   Patient presents with     Surgical Followup     post op/ bilateral inguinal hernia-7/18/18       Initial /80 (BP Location: Right arm, Patient Position: Chair, Cuff Size: Adult Regular)  Pulse 75  Temp 97.7  F (36.5  C) (Tympanic)  Wt 186 lb (84.4 kg)  SpO2 98%  BMI 29.13 kg/m2 Estimated body mass index is 29.13 kg/(m^2) as calculated from the following:    Height as of 8/28/18: 5' 7\" (1.702 m).    Weight as of this encounter: 186 lb (84.4 kg).  Medication Reconciliation: complete    Sangita Montoya LPN    "

## 2018-11-28 ENCOUNTER — TELEPHONE (OUTPATIENT)
Dept: FAMILY MEDICINE | Facility: OTHER | Age: 63
End: 2018-11-28

## 2018-11-29 ENCOUNTER — OFFICE VISIT (OUTPATIENT)
Dept: FAMILY MEDICINE | Facility: OTHER | Age: 63
End: 2018-11-29
Attending: FAMILY MEDICINE
Payer: COMMERCIAL

## 2018-11-29 VITALS
DIASTOLIC BLOOD PRESSURE: 94 MMHG | BODY MASS INDEX: 28.04 KG/M2 | SYSTOLIC BLOOD PRESSURE: 144 MMHG | HEART RATE: 80 BPM | OXYGEN SATURATION: 94 % | TEMPERATURE: 97.9 F | WEIGHT: 179 LBS

## 2018-11-29 DIAGNOSIS — R10.31 ABDOMINAL PAIN, RIGHT LOWER QUADRANT: Primary | ICD-10-CM

## 2018-11-29 DIAGNOSIS — R19.7 DIARRHEA, UNSPECIFIED TYPE: ICD-10-CM

## 2018-11-29 DIAGNOSIS — R63.4 LOSS OF WEIGHT: ICD-10-CM

## 2018-11-29 LAB
ALBUMIN SERPL-MCNC: 3.6 G/DL (ref 3.4–5)
ALBUMIN UR-MCNC: 30 MG/DL
ALP SERPL-CCNC: 91 U/L (ref 40–150)
ALT SERPL W P-5'-P-CCNC: 26 U/L (ref 0–70)
ANION GAP SERPL CALCULATED.3IONS-SCNC: 4 MMOL/L (ref 3–14)
APPEARANCE UR: CLEAR
AST SERPL W P-5'-P-CCNC: 19 U/L (ref 0–45)
BACTERIA #/AREA URNS HPF: ABNORMAL /HPF
BASOPHILS # BLD AUTO: 0 10E9/L (ref 0–0.2)
BASOPHILS NFR BLD AUTO: 0.3 %
BILIRUB SERPL-MCNC: 0.3 MG/DL (ref 0.2–1.3)
BILIRUB UR QL STRIP: NEGATIVE
BUN SERPL-MCNC: 18 MG/DL (ref 7–30)
CALCIUM SERPL-MCNC: 9 MG/DL (ref 8.5–10.1)
CHLORIDE SERPL-SCNC: 105 MMOL/L (ref 94–109)
CO2 SERPL-SCNC: 25 MMOL/L (ref 20–32)
COLOR UR AUTO: YELLOW
CREAT SERPL-MCNC: 0.96 MG/DL (ref 0.66–1.25)
CRP SERPL-MCNC: 59.4 MG/L (ref 0–8)
DIFFERENTIAL METHOD BLD: NORMAL
EOSINOPHIL # BLD AUTO: 0.1 10E9/L (ref 0–0.7)
EOSINOPHIL NFR BLD AUTO: 1 %
ERYTHROCYTE [DISTWIDTH] IN BLOOD BY AUTOMATED COUNT: 13 % (ref 10–15)
ERYTHROCYTE [SEDIMENTATION RATE] IN BLOOD BY WESTERGREN METHOD: 19 MM/H (ref 0–20)
GFR SERPL CREATININE-BSD FRML MDRD: 80 ML/MIN/1.7M2
GLUCOSE SERPL-MCNC: 117 MG/DL (ref 70–99)
GLUCOSE UR STRIP-MCNC: NEGATIVE MG/DL
HCT VFR BLD AUTO: 41.2 % (ref 40–53)
HGB BLD-MCNC: 14.4 G/DL (ref 13.3–17.7)
HGB UR QL STRIP: NEGATIVE
IMM GRANULOCYTES # BLD: 0 10E9/L (ref 0–0.4)
IMM GRANULOCYTES NFR BLD: 0.1 %
KETONES UR STRIP-MCNC: 5 MG/DL
LEUKOCYTE ESTERASE UR QL STRIP: NEGATIVE
LIPASE SERPL-CCNC: 145 U/L (ref 73–393)
LYMPHOCYTES # BLD AUTO: 0.9 10E9/L (ref 0.8–5.3)
LYMPHOCYTES NFR BLD AUTO: 13 %
MCH RBC QN AUTO: 32.5 PG (ref 26.5–33)
MCHC RBC AUTO-ENTMCNC: 35 G/DL (ref 31.5–36.5)
MCV RBC AUTO: 93 FL (ref 78–100)
MONOCYTES # BLD AUTO: 0.7 10E9/L (ref 0–1.3)
MONOCYTES NFR BLD AUTO: 10.1 %
MUCOUS THREADS #/AREA URNS LPF: PRESENT /LPF
NEUTROPHILS # BLD AUTO: 5.3 10E9/L (ref 1.6–8.3)
NEUTROPHILS NFR BLD AUTO: 75.5 %
NITRATE UR QL: NEGATIVE
NRBC # BLD AUTO: 0 10*3/UL
NRBC BLD AUTO-RTO: 0 /100
PH UR STRIP: 6 PH (ref 4.7–8)
PLATELET # BLD AUTO: 204 10E9/L (ref 150–450)
POTASSIUM SERPL-SCNC: 3.9 MMOL/L (ref 3.4–5.3)
PROT SERPL-MCNC: 7.7 G/DL (ref 6.8–8.8)
RBC # BLD AUTO: 4.43 10E12/L (ref 4.4–5.9)
RBC #/AREA URNS AUTO: 1 /HPF (ref 0–2)
SODIUM SERPL-SCNC: 134 MMOL/L (ref 133–144)
SOURCE: ABNORMAL
SP GR UR STRIP: 1.03 (ref 1–1.03)
UROBILINOGEN UR STRIP-MCNC: 2 MG/DL (ref 0–2)
WBC # BLD AUTO: 7 10E9/L (ref 4–11)
WBC #/AREA URNS AUTO: 1 /HPF (ref 0–5)

## 2018-11-29 PROCEDURE — 80053 COMPREHEN METABOLIC PANEL: CPT | Performed by: FAMILY MEDICINE

## 2018-11-29 PROCEDURE — 99214 OFFICE O/P EST MOD 30 MIN: CPT | Performed by: FAMILY MEDICINE

## 2018-11-29 PROCEDURE — 93000 ELECTROCARDIOGRAM COMPLETE: CPT | Performed by: INTERNAL MEDICINE

## 2018-11-29 PROCEDURE — 36415 COLL VENOUS BLD VENIPUNCTURE: CPT | Performed by: FAMILY MEDICINE

## 2018-11-29 PROCEDURE — 85025 COMPLETE CBC W/AUTO DIFF WBC: CPT | Performed by: FAMILY MEDICINE

## 2018-11-29 PROCEDURE — 83690 ASSAY OF LIPASE: CPT | Performed by: FAMILY MEDICINE

## 2018-11-29 PROCEDURE — 85652 RBC SED RATE AUTOMATED: CPT | Performed by: FAMILY MEDICINE

## 2018-11-29 PROCEDURE — 86140 C-REACTIVE PROTEIN: CPT | Performed by: FAMILY MEDICINE

## 2018-11-29 PROCEDURE — 83516 IMMUNOASSAY NONANTIBODY: CPT | Mod: 91 | Performed by: FAMILY MEDICINE

## 2018-11-29 PROCEDURE — 99000 SPECIMEN HANDLING OFFICE-LAB: CPT | Performed by: FAMILY MEDICINE

## 2018-11-29 PROCEDURE — 81001 URINALYSIS AUTO W/SCOPE: CPT | Performed by: FAMILY MEDICINE

## 2018-11-29 PROCEDURE — 83516 IMMUNOASSAY NONANTIBODY: CPT | Mod: 90 | Performed by: FAMILY MEDICINE

## 2018-11-29 ASSESSMENT — ANXIETY QUESTIONNAIRES
5. BEING SO RESTLESS THAT IT IS HARD TO SIT STILL: NOT AT ALL
GAD7 TOTAL SCORE: 4
1. FEELING NERVOUS, ANXIOUS, OR ON EDGE: SEVERAL DAYS
IF YOU CHECKED OFF ANY PROBLEMS ON THIS QUESTIONNAIRE, HOW DIFFICULT HAVE THESE PROBLEMS MADE IT FOR YOU TO DO YOUR WORK, TAKE CARE OF THINGS AT HOME, OR GET ALONG WITH OTHER PEOPLE: NOT DIFFICULT AT ALL
4. TROUBLE RELAXING: SEVERAL DAYS
3. WORRYING TOO MUCH ABOUT DIFFERENT THINGS: NOT AT ALL
7. FEELING AFRAID AS IF SOMETHING AWFUL MIGHT HAPPEN: NOT AT ALL
2. NOT BEING ABLE TO STOP OR CONTROL WORRYING: SEVERAL DAYS
6. BECOMING EASILY ANNOYED OR IRRITABLE: SEVERAL DAYS

## 2018-11-29 ASSESSMENT — PAIN SCALES - GENERAL: PAINLEVEL: NO PAIN (0)

## 2018-11-29 ASSESSMENT — PATIENT HEALTH QUESTIONNAIRE - PHQ9: SUM OF ALL RESPONSES TO PHQ QUESTIONS 1-9: 1

## 2018-11-29 NOTE — PROGRESS NOTES
SUBJECTIVE:                                                    Anthony Steele is a 63 year old male who presents to clinic today for the following health issues:      Abdominal Pain      Duration: since monday    Description (location/character/radiation): lower abdomen       Associated flank pain: right lower side pain into the back sometimes    Intensity:  Mild, currently    Accompanying signs and symptoms:        Fever/Chills: yes chills, but not currenly       Gas/Bloating: YES- severe bloating       Nausea/vomitting: YES- nausea       Diarrhea: YES, since monday       Dysuria or Hematuria: no     History (previous similar pain/trauma/previous testing): mucusy BM, hernia surgery recently, had some issues in the past while in [place] and was supposed to have a colonoscopy to follow up due to blood in stool but did not follow up    Precipitating or alleviating factors:       Pain worse with eating/BM/urination: yes, eating and prior to BM        Pain relieved by BM: YES- slightly but takes a while to settle    Therapies tried and outcome: None    LMP:  not applicable    Had episode in [place] this summer - though to have food poisoning -- now has similar sx - no blood.     Monday - increase crampying and low abdominal pain - one time explosive stool   Until gets another abdominal pain.      Mucus - no blood    Since last visit this summer - some on and off cramping    Has h/o polyps and due for colonoscopy     Has lost some wt - 10 -15 lbs or so-- not tried to loose wt.     Has some early saitey and some bloating     Previous to this no diarrhea    No GERD- on PPI    No fever or chills    No recent  travel or abx.     Better today - worse on tues/wed.    .  Past Medical History:   Diagnosis Date     Acute myocardial infarction, unspecified site, episode of care unspecified 08/03/2011     Adjustment disorder with mixed anxiety and depressed mood 05/23/2012     CAD (coronary artery disease) 3/19/2013     Colon  polyps      Coronary atherosclerosis of unspecified type of vessel, native or graft 01/06/2012     Displacement of cervical intervertebral disc without myelopathy      GERD (gastroesophageal reflux disease)      H/O ETOH abuse      HTN (hypertension)      Mixed hyperlipidemia 08/03/2011     Personal history of tobacco use, presenting hazards to health 09/06/2011    Tobacco abuse, in remission     Postsurgical percutaneous transluminal coronary angioplasty status 08/03/2011     Prediabetes      Status post coronary angiogram 3/19/2013     Tobacco abuse, in remission      Past Surgical History:   Procedure Laterality Date     ANGIOPLASTY  [id number]    Failed angioplasty of OM vessel     ARTHROSCOPY KNEE      RT     BACK SURGERY      C5-6 fusion     Carpal Tunnel Syndrome  2003    Resolved from Problem List  2012     COLONOSCOPY  2008    colonoscopy with polypectomy/ Repeat in 2011     COLONOSCOPY  3/24/2014    Repeat in 2019//Procedure: COLONOSCOPY;  COLONOSCOPY;  Surgeon: Jessica Washington MD;  Location: [facility] OR     LAPAROSCOPIC HERNIORRHAPHY INGUINAL BILATERAL Bilateral 7/18/2018    Procedure: LAPAROSCOPIC HERNIORRHAPHY INGUINAL BILATERAL;  LAPAROSCOPIC  INGUINAL HERNIA REPAIR, BILATERAL;  Surgeon: Louis Tomlin DO;  Location: [facility] OR     RELEASE CARPAL TUNNEL      RT     STENT  07/2011    Myocardial Infarction; Stent placement [facility]     STENT, CORONARY, LIZZIE  2012         Problem list and histories reviewed & adjusted, as indicated.  Additional history: as documented    Labs reviewed in EPIC    ROS:  CONSTITUTIONAL: NEGATIVE for fever, chills, change in weight  ENT/MOUTH: NEGATIVE for ear, mouth and throat problems  RESP: NEGATIVE for significant cough or SOB  CV: NEGATIVE for chest pain, palpitations or peripheral edema  ROS otherwise negative    OBJECTIVE:                                                    BP (!) 144/94  Pulse 80  Temp 97.9  F (36.6  C) (Tympanic)  Wt 179 lb (81.2 kg)  SpO2 94%  BMI  28.04 kg/m2  Body mass index is 28.04 kg/(m^2).   GENERAL: healthy, alert, well nourished, well hydrated, no distress  HENT: ear canals- normal; TMs- normal; Nose- normal; Mouth- no ulcers, no lesions  NECK: no tenderness, no adenopathy, no asymmetry, no masses, no stiffness; thyroid- normal to palpation  RESP: lungs clear to auscultation - no rales, no rhonchi, no wheezes  CV: regular rates and rhythm, normal S1 S2, no S3 or S4 and no murmur, no click or rub -  ABDOMEN: soft, NON- tenderness, no  hepatosplenomegaly, no masses, normal bowel sounds  MS: extremities- no gross deformities noted, no edema  SKIN: no suspicious lesions, no rashes  PSYCH: Alert and oriented times 3; speech- coherent , normal rate and volume; able to articulate logical thoughts, able to abstract reason, no tangential thoughts, no hallucinations or delusions, affect- normal    Results for orders placed or performed in visit on 11/29/18 (from the past 24 hour(s))   CRP inflammation   Result Value Ref Range    CRP Inflammation 59.4 (H) 0.0 - 8.0 mg/L   Comprehensive metabolic panel   Result Value Ref Range    Sodium 134 133 - 144 mmol/L    Potassium 3.9 3.4 - 5.3 mmol/L    Chloride 105 94 - 109 mmol/L    Carbon Dioxide 25 20 - 32 mmol/L    Anion Gap 4 3 - 14 mmol/L    Glucose 117 (H) 70 - 99 mg/dL    Urea Nitrogen 18 7 - 30 mg/dL    Creatinine 0.96 0.66 - 1.25 mg/dL    GFR Estimate 80 >60 mL/min/1.7m2    GFR Estimate If Black >90 >60 mL/min/1.7m2    Calcium 9.0 8.5 - 10.1 mg/dL    Bilirubin Total 0.3 0.2 - 1.3 mg/dL    Albumin 3.6 3.4 - 5.0 g/dL    Protein Total 7.7 6.8 - 8.8 g/dL    Alkaline Phosphatase 91 40 - 150 U/L    ALT 26 0 - 70 U/L    AST 19 0 - 45 U/L   CBC with platelets differential   Result Value Ref Range    WBC 7.0 4.0 - 11.0 10e9/L    RBC Count 4.43 4.4 - 5.9 10e12/L    Hemoglobin 14.4 13.3 - 17.7 g/dL    Hematocrit 41.2 40.0 - 53.0 %    MCV 93 78 - 100 fl    MCH 32.5 26.5 - 33.0 pg    MCHC 35.0 31.5 - 36.5 g/dL    RDW 13.0  10.0 - 15.0 %    Platelet Count 204 150 - 450 10e9/L    Diff Method Automated Method     % Neutrophils 75.5 %    % Lymphocytes 13.0 %    % Monocytes 10.1 %    % Eosinophils 1.0 %    % Basophils 0.3 %    % Immature Granulocytes 0.1 %    Nucleated RBCs 0 0 /100    Absolute Neutrophil 5.3 1.6 - 8.3 10e9/L    Absolute Lymphocytes 0.9 0.8 - 5.3 10e9/L    Absolute Monocytes 0.7 0.0 - 1.3 10e9/L    Absolute Eosinophils 0.1 0.0 - 0.7 10e9/L    Absolute Basophils 0.0 0.0 - 0.2 10e9/L    Abs Immature Granulocytes 0.0 0 - 0.4 10e9/L    Absolute Nucleated RBC 0.0    Erythrocyte sedimentation rate auto   Result Value Ref Range    Sed Rate 19 0 - 20 mm/h   Lipase   Result Value Ref Range    Lipase 145 73 - 393 U/L   UA with Microscopic reflex to Culture   Result Value Ref Range    Color Urine Yellow     Appearance Urine Clear     Glucose Urine Negative NEG^Negative mg/dL    Bilirubin Urine Negative NEG^Negative    Ketones Urine 5 (A) NEG^Negative mg/dL    Specific Gravity Urine 1.029 1.003 - 1.035    Blood Urine Negative NEG^Negative    pH Urine 6.0 4.7 - 8.0 pH    Protein Albumin Urine 30 (A) NEG^Negative mg/dL    Urobilinogen mg/dL 2.0 0.0 - 2.0 mg/dL    Nitrite Urine Negative NEG^Negative    Leukocyte Esterase Urine Negative NEG^Negative    Source Midstream Urine     WBC Urine 1 0 - 5 /HPF    RBC Urine 1 0 - 2 /HPF    Bacteria Urine None (A) NEG^Negative /HPF    Mucous Urine Present (A) NEG^Negative /LPF     EKG normal      ASSESSMENT/PLAN:                                                        ICD-10-CM    1. Abdominal pain, right lower quadrant R10.31 CRP inflammation     Comprehensive metabolic panel     CBC with platelets differential     Erythrocyte sedimentation rate auto     UA with Microscopic reflex to Culture     Lipase     Tissue transglutaminase quique IgA and IgG     EKG 12-lead complete w/read - Clinics     GENERAL SURG ADULT REFERRAL   2. Loss of weight R63.4 CRP inflammation     Comprehensive metabolic panel      CBC with platelets differential     Erythrocyte sedimentation rate auto     UA with Microscopic reflex to Culture     Lipase     Tissue transglutaminase quique IgA and IgG     EKG 12-lead complete w/read - Clinics     GENERAL SURG ADULT REFERRAL   3. Diarrhea, unspecified type R19.7 CRP inflammation     Comprehensive metabolic panel     CBC with platelets differential     Erythrocyte sedimentation rate auto     UA with Microscopic reflex to Culture     Lipase     Tissue transglutaminase quique IgA and IgG     EKG 12-lead complete w/read - Clinics     GENERAL SURG ADULT REFERRAL     OLD CT shows colitis - sounds to have brewing sx and now worse.  Has gotten better in last 24 hrs. Hold on repeating CT.  Recommend bland diet and see surgery for colonoscopy.  Pt agrees.  Symptomatic treatment was discussed along when patient should call and/or come back into the clinic or go to ER/Urgent care. All questions answered.   Referral to see surgery in next week.  Pt is cleared for colonoscopy     Abdulkadir Huizar MD  [facility]

## 2018-11-29 NOTE — NURSING NOTE
"No chief complaint on file.      Initial BP (!) 144/94  Pulse 80  Temp 97.9  F (36.6  C) (Tympanic)  Wt 179 lb (81.2 kg)  SpO2 94%  BMI 28.04 kg/m2 Estimated body mass index is 28.04 kg/(m^2) as calculated from the following:    Height as of 8/28/18: 5' 7\" (1.702 m).    Weight as of this encounter: 179 lb (81.2 kg).  Medication Reconciliation: complete    Ju Colon MA      "

## 2018-11-29 NOTE — PATIENT INSTRUCTIONS
Understanding Colitis   Colitis is when a part of your colon becomes inflamed or swollen. The colon is also called the large intestine. It helps with digestion and waste removal.   What causes colitis?   Colitis can be caused by many things. The most common causes are:    Viral or bacterial infections    Inflammatory bowel disease (ulcerative colitis or Crohn s disease)    Certain medicines, such as antibiotics    Radiation therapy to the colon   Symptoms of colitis   The symptoms of colitis may last a short time. Or they can be chronic. The most common symptoms are:    Diarrhea, sometimes bloody    Stomach pain or cramping    Fever    Weight loss in severe cases   Diagnosing colitis  Your healthcare provider will take a full health history and family history. He or she will also give you a physical exam. Depending on the results of your history and physical exam, your provider may also order certain tests to help find out the cause of your colitis. These may include:    Lab tests. Your blood and stool will be checked.    Endoscopy and biopsy.  Endoscopy uses a long, flexible tube with a tiny light and camera on one end to check the inside of your large intestine. When only the colon is checked, this is called a colonoscopy. During an endoscopy, your provider may take a small sample of your tissue to look at under a microscope. This is called a biopsy.  Treatment for colitis   Treatment for colitis depends on what is causing it and how serious your symptoms are. In some cases, you may not need treatment. For example, colitis from an infection may go away without care.   Treatment may include:     Medicines. You may take these by mouth (oral) or  as a rectal suppository or enema. They can lessen swelling and ease symptoms.    Changes in your diet. Some foods can make symptoms worse. Common triggers are milk, coffee, alcohol, and fried foods.    Surgery. In some cases, you may need surgery to remove a damaged part  of the colon.     Call 911  Call 911 if any of these occur:    Trouble breathing    Confusion    Very drowsy or trouble awakening    Fainting or loss of consciousness    Rapid heart rate    Chest pain   When to call your healthcare provider   Call your healthcare provider right away if you have any of these:    Symptoms that don t get better, or get worse    Fever of 100.4 F (38 C) or higher, or as directed by your healthcare provider    Pain that gets worse    Bloody diarrhea    Bleeding from your rectum    New symptoms      Date Last Reviewed: 12/1/2017 2000-2018 [employer]. [address] [place] [address]. All rights reserved. This information is not intended as a substitute for professional medical care. Always follow your healthcare professional's instructions.      Results for orders placed or performed in visit on 11/29/18 (from the past 24 hour(s))   CRP inflammation   Result Value Ref Range    CRP Inflammation 59.4 (H) 0.0 - 8.0 mg/L   Comprehensive metabolic panel   Result Value Ref Range    Sodium 134 133 - 144 mmol/L    Potassium 3.9 3.4 - 5.3 mmol/L    Chloride 105 94 - 109 mmol/L    Carbon Dioxide 25 20 - 32 mmol/L    Anion Gap 4 3 - 14 mmol/L    Glucose 117 (H) 70 - 99 mg/dL    Urea Nitrogen 18 7 - 30 mg/dL    Creatinine 0.96 0.66 - 1.25 mg/dL    GFR Estimate 80 >60 mL/min/1.7m2    GFR Estimate If Black >90 >60 mL/min/1.7m2    Calcium 9.0 8.5 - 10.1 mg/dL    Bilirubin Total 0.3 0.2 - 1.3 mg/dL    Albumin 3.6 3.4 - 5.0 g/dL    Protein Total 7.7 6.8 - 8.8 g/dL    Alkaline Phosphatase 91 40 - 150 U/L    ALT 26 0 - 70 U/L    AST 19 0 - 45 U/L   CBC with platelets differential   Result Value Ref Range    WBC 7.0 4.0 - 11.0 10e9/L    RBC Count 4.43 4.4 - 5.9 10e12/L    Hemoglobin 14.4 13.3 - 17.7 g/dL    Hematocrit 41.2 40.0 - 53.0 %    MCV 93 78 - 100 fl    MCH 32.5 26.5 - 33.0 pg    MCHC 35.0 31.5 - 36.5 g/dL    RDW 13.0 10.0 - 15.0 %    Platelet Count 204 150 - 450 10e9/L     Diff Method Automated Method     % Neutrophils 75.5 %    % Lymphocytes 13.0 %    % Monocytes 10.1 %    % Eosinophils 1.0 %    % Basophils 0.3 %    % Immature Granulocytes 0.1 %    Nucleated RBCs 0 0 /100    Absolute Neutrophil 5.3 1.6 - 8.3 10e9/L    Absolute Lymphocytes 0.9 0.8 - 5.3 10e9/L    Absolute Monocytes 0.7 0.0 - 1.3 10e9/L    Absolute Eosinophils 0.1 0.0 - 0.7 10e9/L    Absolute Basophils 0.0 0.0 - 0.2 10e9/L    Abs Immature Granulocytes 0.0 0 - 0.4 10e9/L    Absolute Nucleated RBC 0.0    Erythrocyte sedimentation rate auto   Result Value Ref Range    Sed Rate 19 0 - 20 mm/h   Lipase   Result Value Ref Range    Lipase 145 73 - 393 U/L   UA with Microscopic reflex to Culture   Result Value Ref Range    Color Urine Yellow     Appearance Urine Clear     Glucose Urine Negative NEG^Negative mg/dL    Bilirubin Urine Negative NEG^Negative    Ketones Urine 5 (A) NEG^Negative mg/dL    Specific Gravity Urine 1.029 1.003 - 1.035    Blood Urine Negative NEG^Negative    pH Urine 6.0 4.7 - 8.0 pH    Protein Albumin Urine 30 (A) NEG^Negative mg/dL    Urobilinogen mg/dL 2.0 0.0 - 2.0 mg/dL    Nitrite Urine Negative NEG^Negative    Leukocyte Esterase Urine Negative NEG^Negative    Source Midstream Urine     WBC Urine 1 0 - 5 /HPF    RBC Urine 1 0 - 2 /HPF    Bacteria Urine None (A) NEG^Negative /HPF    Mucous Urine Present (A) NEG^Negative /LPF

## 2018-11-29 NOTE — MR AVS SNAPSHOT
After Visit Summary   11/29/2018    Anthony Steele    MRN: [id number]           Patient Information     Date Of Birth          1955        Visit Information        Provider Department      11/29/2018 9:00 AM Abdulkadir Huizar MD [facility] [place]        Today's Diagnoses     Abdominal pain, right lower quadrant    -  1    Loss of weight        Diarrhea, unspecified type          Care Instructions      Understanding Colitis   Colitis is when a part of your colon becomes inflamed or swollen. The colon is also called the large intestine. It helps with digestion and waste removal.   What causes colitis?   Colitis can be caused by many things. The most common causes are:    Viral or bacterial infections    Inflammatory bowel disease (ulcerative colitis or Crohn s disease)    Certain medicines, such as antibiotics    Radiation therapy to the colon   Symptoms of colitis   The symptoms of colitis may last a short time. Or they can be chronic. The most common symptoms are:    Diarrhea, sometimes bloody    Stomach pain or cramping    Fever    Weight loss in severe cases   Diagnosing colitis  Your healthcare provider will take a full health history and family history. He or she will also give you a physical exam. Depending on the results of your history and physical exam, your provider may also order certain tests to help find out the cause of your colitis. These may include:    Lab tests. Your blood and stool will be checked.    Endoscopy and biopsy.  Endoscopy uses a long, flexible tube with a tiny light and camera on one end to check the inside of your large intestine. When only the colon is checked, this is called a colonoscopy. During an endoscopy, your provider may take a small sample of your tissue to look at under a microscope. This is called a biopsy.  Treatment for colitis   Treatment for colitis depends on what is causing it and how serious your symptoms are. In some cases, you may  not need treatment. For example, colitis from an infection may go away without care.   Treatment may include:     Medicines. You may take these by mouth (oral) or  as a rectal suppository or enema. They can lessen swelling and ease symptoms.    Changes in your diet. Some foods can make symptoms worse. Common triggers are milk, coffee, alcohol, and fried foods.    Surgery. In some cases, you may need surgery to remove a damaged part of the colon.     Call 911  Call 911 if any of these occur:    Trouble breathing    Confusion    Very drowsy or trouble awakening    Fainting or loss of consciousness    Rapid heart rate    Chest pain   When to call your healthcare provider   Call your healthcare provider right away if you have any of these:    Symptoms that don t get better, or get worse    Fever of 100.4 F (38 C) or higher, or as directed by your healthcare provider    Pain that gets worse    Bloody diarrhea    Bleeding from your rectum    New symptoms      Date Last Reviewed: 12/1/2017 2000-2018 [employer]. [address] [place] [address]. All rights reserved. This information is not intended as a substitute for professional medical care. Always follow your healthcare professional's instructions.      Results for orders placed or performed in visit on 11/29/18 (from the past 24 hour(s))   CRP inflammation   Result Value Ref Range    CRP Inflammation 59.4 (H) 0.0 - 8.0 mg/L   Comprehensive metabolic panel   Result Value Ref Range    Sodium 134 133 - 144 mmol/L    Potassium 3.9 3.4 - 5.3 mmol/L    Chloride 105 94 - 109 mmol/L    Carbon Dioxide 25 20 - 32 mmol/L    Anion Gap 4 3 - 14 mmol/L    Glucose 117 (H) 70 - 99 mg/dL    Urea Nitrogen 18 7 - 30 mg/dL    Creatinine 0.96 0.66 - 1.25 mg/dL    GFR Estimate 80 >60 mL/min/1.7m2    GFR Estimate If Black >90 >60 mL/min/1.7m2    Calcium 9.0 8.5 - 10.1 mg/dL    Bilirubin Total 0.3 0.2 - 1.3 mg/dL    Albumin 3.6 3.4 - 5.0 g/dL    Protein Total 7.7  6.8 - 8.8 g/dL    Alkaline Phosphatase 91 40 - 150 U/L    ALT 26 0 - 70 U/L    AST 19 0 - 45 U/L   CBC with platelets differential   Result Value Ref Range    WBC 7.0 4.0 - 11.0 10e9/L    RBC Count 4.43 4.4 - 5.9 10e12/L    Hemoglobin 14.4 13.3 - 17.7 g/dL    Hematocrit 41.2 40.0 - 53.0 %    MCV 93 78 - 100 fl    MCH 32.5 26.5 - 33.0 pg    MCHC 35.0 31.5 - 36.5 g/dL    RDW 13.0 10.0 - 15.0 %    Platelet Count 204 150 - 450 10e9/L    Diff Method Automated Method     % Neutrophils 75.5 %    % Lymphocytes 13.0 %    % Monocytes 10.1 %    % Eosinophils 1.0 %    % Basophils 0.3 %    % Immature Granulocytes 0.1 %    Nucleated RBCs 0 0 /100    Absolute Neutrophil 5.3 1.6 - 8.3 10e9/L    Absolute Lymphocytes 0.9 0.8 - 5.3 10e9/L    Absolute Monocytes 0.7 0.0 - 1.3 10e9/L    Absolute Eosinophils 0.1 0.0 - 0.7 10e9/L    Absolute Basophils 0.0 0.0 - 0.2 10e9/L    Abs Immature Granulocytes 0.0 0 - 0.4 10e9/L    Absolute Nucleated RBC 0.0    Erythrocyte sedimentation rate auto   Result Value Ref Range    Sed Rate 19 0 - 20 mm/h   Lipase   Result Value Ref Range    Lipase 145 73 - 393 U/L   UA with Microscopic reflex to Culture   Result Value Ref Range    Color Urine Yellow     Appearance Urine Clear     Glucose Urine Negative NEG^Negative mg/dL    Bilirubin Urine Negative NEG^Negative    Ketones Urine 5 (A) NEG^Negative mg/dL    Specific Gravity Urine 1.029 1.003 - 1.035    Blood Urine Negative NEG^Negative    pH Urine 6.0 4.7 - 8.0 pH    Protein Albumin Urine 30 (A) NEG^Negative mg/dL    Urobilinogen mg/dL 2.0 0.0 - 2.0 mg/dL    Nitrite Urine Negative NEG^Negative    Leukocyte Esterase Urine Negative NEG^Negative    Source Midstream Urine     WBC Urine 1 0 - 5 /HPF    RBC Urine 1 0 - 2 /HPF    Bacteria Urine None (A) NEG^Negative /HPF    Mucous Urine Present (A) NEG^Negative /LPF               Follow-ups after your visit        Additional Services     GENERAL SURG ADULT REFERRAL       Your provider has referred you to:   Nabor or partner for colonoscopy - would like to have him seen in next week -- probable colitis ?? Lymphocytic   Please be aware that coverage of these services is subject to the terms and limitations of your health insurance plan.  Call member services at your health plan with any benefit or coverage questions.      Please bring the following with you to your appointment:    (1) Any X-Rays, CTs or MRIs which have been performed.  Contact the facility where they were done to arrange for  prior to your scheduled appointment.   (2) List of current medications   (3) This referral request   (4) Any documents/labs given to you for this referral                  Who to contact     If you have questions or need follow up information about today's clinic visit or your schedule please contact [facility] directly at [phone].  Normal or non-critical lab and imaging results will be communicated to you by MyChart, letter or phone within 4 business days after the clinic has received the results. If you do not hear from us within 7 days, please contact the clinic through MyChart or phone. If you have a critical or abnormal lab result, we will notify you by phone as soon as possible.  Submit refill requests through [email or web address] or call your pharmacy and they will forward the refill request to us. Please allow 3 business days for your refill to be completed.          Additional Information About Your Visit        Care EveryWhere ID     This is your Care EveryWhere ID. This could be used by other organizations to access your [place] medical records  [id number]        Your Vitals Were     Pulse Temperature Pulse Oximetry BMI (Body Mass Index)          80 97.9  F (36.6  C) (Tympanic) 94% 28.04 kg/m2         Blood Pressure from Last 3 Encounters:   11/29/18 (!) 144/94   10/02/18 128/80   08/28/18 120/76    Weight from Last 3 Encounters:   11/29/18 179 lb (81.2 kg)   10/02/18 186 lb (84.4 kg)    08/28/18 186 lb (84.4 kg)              We Performed the Following     CBC with platelets differential     Comprehensive metabolic panel     CRP inflammation     EKG 12-lead complete w/read - Clinics     Erythrocyte sedimentation rate auto     GENERAL SURG ADULT REFERRAL     Lipase     Tissue transglutaminase quique IgA and IgG     UA with Microscopic reflex to Culture        Primary Care Provider Office Phone # Fax #    Abdulkadir Huizar [phone] [address] [facility] [address]        Equal Access to Services     BRENDA CROWE : Hadii aad ku hadasho Soomaali, waaxda luqadaha, qaybta kaalmada adeegyada, waxay idiin hayaan adeeg kharash larinku . [facility] [phone].    ATENCIÓN: Si nusrat vazquez, tiene a arita disposición servicios gratuitos de asistencia lingüística. [facility] [phone].    We comply with applicable federal civil rights laws and [place] laws. We do not discriminate on the basis of race, color, national origin, age, disability, sex, sexual orientation, or gender identity.            Thank you!     Thank you for choosing [facility]  for your care. Our goal is always to provide you with excellent care. Hearing back from our patients is one way we can continue to improve our services. Please take a few minutes to complete the written survey that you may receive in the mail after your visit with us. Thank you!             Your Updated Medication List - Protect others around you: Learn how to safely use, store and throw away your medicines at www.disposemymeds.org.          This list is accurate as of 11/29/18 10:33 AM.  Always use your most recent med list.                   Brand Name Dispense Instructions for use Diagnosis    aspirin 325 MG tablet    ASA    180 tablet    Take 1 tablet (325 mg) by mouth daily    Coronary artery disease involving native coronary artery of native heart without angina pectoris       atorvastatin 40 MG tablet    LIPITOR     30 tablet    TAKE 1 TABLET DAILY BY MOUTH - GENERIC FOR LIPITOR    Mixed hyperlipidemia       buPROPion 300 MG 24 hr tablet    WELLBUTRIN XL    90 tablet    TAKE 1 TABLET DAILY BY MOUTH    Adjustment disorder with depressed mood       EPINEPHrine 0.3 MG/0.3ML injection 2-pack    EPIPEN/ADRENACLICK/or ANY BX GENERIC EQUIV    0.6 mL    Inject 0.3 mLs (0.3 mg) into the muscle as needed for anaphylaxis    Shellfish allergy       lisinopril 10 MG tablet    PRINIVIL/ZESTRIL    90 tablet    TAKE 1 TABLET BY MOUTH DAILY- GENERIC FOR ZESTRIL    Mixed hyperlipidemia       metoprolol tartrate 25 MG tablet    LOPRESSOR    180 tablet    TAKE ONE TABLET TWO TIMES A DAY BY MOUTH    Essential hypertension with goal blood pressure less than 140/90       nitroGLYcerin 0.4 MG sublingual tablet    NITROSTAT    25 tablet    PLACE 1 TABLET UNDER THE TONGUE EVERY FIVE MINUTES AS NEEDED FOR CHEST PAIN. DO NOT CRUSH; MAXIMUM OF 3 DOSES IN 15 MINUTES.    Coronary artery disease involving native coronary artery of native heart without angina pectoris       OMEGA-3 FISH OIL PO      Take 1 g by mouth        omeprazole 40 MG DR capsule    priLOSEC    90 capsule    TAKE 1 CAPSULE BY MOUTH ONCE DAILY. TAKE 30 TO 60 MINUTES BEFORE A MEAL.    Gastroesophageal reflux disease without esophagitis

## 2018-11-30 ASSESSMENT — ANXIETY QUESTIONNAIRES: GAD7 TOTAL SCORE: 4

## 2018-12-03 ENCOUNTER — TELEPHONE (OUTPATIENT)
Dept: FAMILY MEDICINE | Facility: OTHER | Age: 63
End: 2018-12-03

## 2018-12-03 DIAGNOSIS — R63.4 LOSS OF WEIGHT: ICD-10-CM

## 2018-12-03 DIAGNOSIS — R19.7 DIARRHEA: ICD-10-CM

## 2018-12-03 DIAGNOSIS — R19.7 DIARRHEA, UNSPECIFIED TYPE: ICD-10-CM

## 2018-12-03 DIAGNOSIS — R10.31 ABDOMINAL PAIN, RIGHT LOWER QUADRANT: Primary | ICD-10-CM

## 2018-12-03 LAB
TTG IGA SER-ACNC: 2 U/ML
TTG IGG SER-ACNC: <1 U/ML

## 2018-12-23 DIAGNOSIS — F43.21 ADJUSTMENT DISORDER WITH DEPRESSED MOOD: ICD-10-CM

## 2018-12-24 NOTE — TELEPHONE ENCOUNTER
Wellbutrin       Last Written Prescription Date:  6/4/2018  Last Fill Quantity: 90,   # refills: 1  Last Office Visit: 11/29/2018  Future Office visit:       Routing refill request to provider for review/approval because:  Drug not on the [facility], P or [facility] refill protocol or controlled substance

## 2018-12-26 RX ORDER — BUPROPION HYDROCHLORIDE 300 MG/1
TABLET ORAL
Qty: 90 TABLET | Refills: 1 | Status: SHIPPED | OUTPATIENT
Start: 2018-12-26 | End: 2019-07-03

## 2018-12-31 ENCOUNTER — TELEPHONE (OUTPATIENT)
Dept: FAMILY MEDICINE | Facility: OTHER | Age: 63
End: 2018-12-31

## 2018-12-31 NOTE — TELEPHONE ENCOUNTER
LM for patient to schedule nurse only for blood pressure check at his convenience.  # given to schedule for nurse only.

## 2019-01-07 ENCOUNTER — TRANSFERRED RECORDS (OUTPATIENT)
Dept: HEALTH INFORMATION MANAGEMENT | Facility: CLINIC | Age: 64
End: 2019-01-07

## 2019-01-25 ENCOUNTER — TRANSFERRED RECORDS (OUTPATIENT)
Dept: HEALTH INFORMATION MANAGEMENT | Facility: CLINIC | Age: 64
End: 2019-01-25

## 2019-04-11 ENCOUNTER — TRANSFERRED RECORDS (OUTPATIENT)
Dept: HEALTH INFORMATION MANAGEMENT | Facility: CLINIC | Age: 64
End: 2019-04-11

## 2019-04-24 DIAGNOSIS — E78.2 MIXED HYPERLIPIDEMIA: ICD-10-CM

## 2019-04-24 NOTE — TELEPHONE ENCOUNTER
Lisinopril  Last Written Prescription Date: 5/1/18  Last Fill Quantity: 90 # of Refills: 3  Last Office Visit: 11/29/18

## 2019-04-26 RX ORDER — LISINOPRIL 10 MG/1
TABLET ORAL
Qty: 90 TABLET | Refills: 0 | Status: SHIPPED | OUTPATIENT
Start: 2019-04-26 | End: 2019-07-23

## 2019-05-10 ENCOUNTER — TELEPHONE (OUTPATIENT)
Dept: FAMILY MEDICINE | Facility: OTHER | Age: 64
End: 2019-05-10

## 2019-05-10 NOTE — TELEPHONE ENCOUNTER
Received a call from Wilma at [facility]. She she would like her number documented in case he needs her number.     [phone]  Ext: [phone]

## 2019-07-03 DIAGNOSIS — F43.21 ADJUSTMENT DISORDER WITH DEPRESSED MOOD: ICD-10-CM

## 2019-07-03 RX ORDER — BUPROPION HYDROCHLORIDE 300 MG/1
TABLET ORAL
Qty: 90 TABLET | Refills: 1 | Status: SHIPPED | OUTPATIENT
Start: 2019-07-03 | End: 2019-12-23

## 2019-07-10 DIAGNOSIS — E78.2 MIXED HYPERLIPIDEMIA: ICD-10-CM

## 2019-07-10 RX ORDER — ATORVASTATIN CALCIUM 40 MG/1
TABLET, FILM COATED ORAL
Qty: 30 TABLET | Refills: 0 | Status: SHIPPED | OUTPATIENT
Start: 2019-07-10 | End: 2020-02-26

## 2019-07-23 DIAGNOSIS — E78.2 MIXED HYPERLIPIDEMIA: ICD-10-CM

## 2019-07-23 RX ORDER — LISINOPRIL 10 MG/1
TABLET ORAL
Qty: 90 TABLET | Refills: 1 | Status: SHIPPED | OUTPATIENT
Start: 2019-07-23 | End: 2020-01-30

## 2019-09-21 DIAGNOSIS — I10 ESSENTIAL HYPERTENSION WITH GOAL BLOOD PRESSURE LESS THAN 140/90: ICD-10-CM

## 2019-09-23 DIAGNOSIS — K21.9 GASTROESOPHAGEAL REFLUX DISEASE WITHOUT ESOPHAGITIS: ICD-10-CM

## 2019-09-23 RX ORDER — METOPROLOL TARTRATE 25 MG/1
TABLET, FILM COATED ORAL
Qty: 180 TABLET | Refills: 0 | Status: SHIPPED | OUTPATIENT
Start: 2019-09-23 | End: 2020-01-29

## 2019-09-24 RX ORDER — OMEPRAZOLE 40 MG/1
CAPSULE, DELAYED RELEASE ORAL
Qty: 90 CAPSULE | Refills: 2 | Status: SHIPPED | OUTPATIENT
Start: 2019-09-24 | End: 2020-02-26

## 2019-12-22 DIAGNOSIS — F43.21 ADJUSTMENT DISORDER WITH DEPRESSED MOOD: ICD-10-CM

## 2019-12-23 RX ORDER — BUPROPION HYDROCHLORIDE 300 MG/1
TABLET ORAL
Qty: 30 TABLET | Refills: 0 | Status: SHIPPED | OUTPATIENT
Start: 2019-12-23 | End: 2020-01-30

## 2020-01-25 DIAGNOSIS — I10 ESSENTIAL HYPERTENSION WITH GOAL BLOOD PRESSURE LESS THAN 140/90: ICD-10-CM

## 2020-01-25 NOTE — LETTER
January 29, 2020      Anthony Steeel  [address]  [facility] [address]        Dear Anthony,     APPOINTMENT REMINDER:   Our records indicates that it is time for you to be seen for an office visit and medication review.     Your current medication request for metoprolol will be approved for one refill but you will need to be seen before any additional refills can be approved.  Taking care of your health is important to us, and ongoing visits with your provider are vital to your care.    We look forward to seeing you in the near future.  You may call our office at [phone] to schedule a visit.     Please disregard this notice if you have already made an appointment.        Sincerely,        Abdulkadir Huizar MD

## 2020-01-26 DIAGNOSIS — E78.2 MIXED HYPERLIPIDEMIA: ICD-10-CM

## 2020-01-29 DIAGNOSIS — F43.21 ADJUSTMENT DISORDER WITH DEPRESSED MOOD: ICD-10-CM

## 2020-01-29 RX ORDER — METOPROLOL TARTRATE 25 MG/1
TABLET, FILM COATED ORAL
Qty: 180 TABLET | Refills: 0 | Status: SHIPPED | OUTPATIENT
Start: 2020-01-29 | End: 2020-02-26

## 2020-01-29 NOTE — TELEPHONE ENCOUNTER
metoprolol tartrate (LOPRESSOR) 25 MG tablet      Last Written Prescription Date:  9/23/19  Last Fill Quantity: 180,   # refills: 0  Last Office Visit: 11/29/18  Future Office visit:       Routing refill request to provider for review/approval because:

## 2020-01-30 RX ORDER — LISINOPRIL 10 MG/1
TABLET ORAL
Qty: 90 TABLET | Refills: 0 | Status: SHIPPED | OUTPATIENT
Start: 2020-01-30 | End: 2020-02-26

## 2020-01-30 RX ORDER — BUPROPION HYDROCHLORIDE 300 MG/1
TABLET ORAL
Qty: 30 TABLET | Refills: 0 | Status: SHIPPED | OUTPATIENT
Start: 2020-01-30 | End: 2020-02-24

## 2020-01-30 NOTE — TELEPHONE ENCOUNTER
Wellbutrin  Last Written Prescription Date: 12/23/19  Last Fill Quantity: 30 # of Refills: 0  Last Office Visit: 11/29/18

## 2020-02-20 NOTE — PROGRESS NOTES
Subjective     Anthony Steele is a 64 year old male who presents to clinic today for the following health issues:    HPI   Hyperlipidemia Follow-Up      Are you regularly taking any medication or supplement to lower your cholesterol?   Yes- lipitor    Are you having muscle aches or other side effects that you think could be caused by your cholesterol lowering medication?  No    Vascular Disease Follow-up      How often do you take nitroglycerin? Never    Do you take an aspirin every day? Yes      How many servings of fruits and vegetables do you eat daily?  0-1    On average, how many sweetened beverages do you drink each day (Examples: soda, juice, sweet tea, etc.  Do NOT count diet or artificially sweetened beverages)?   0    How many days per week do you exercise enough to make your heart beat faster? none    How many minutes a day do you exercise enough to make your heart beat faster? none    How many days per week do you miss taking your medication? 0    Depression and Anxiety Follow-Up    How are you doing with your depression since your last visit? No change    How are you doing with your anxiety since your last visit?  No change    Are you having other symptoms that might be associated with depression or anxiety? No    Have you had a significant life event? Health Concerns     Do you have any concerns with your use of alcohol or other drugs? No     Overall doing ok with all issues with cancer dx    Social History     Tobacco Use     Smoking status: Former Smoker     Packs/day: 1.00     Years: 20.00     Pack years: 20.00     Smokeless tobacco: Never Used     Tobacco comment: Tried to quit: yes; longest period tobacco-free- 10 years   Substance Use Topics     Alcohol use: Yes     Comment: Occasionally     Drug use: None     PHQ 7/13/2018 11/29/2018 2/26/2020   PHQ-9 Total Score 0 1 8   Q9: Thoughts of better off dead/self-harm past 2 weeks Not at all Not at all Not at all     MACEY-7 SCORE 7/13/2018 11/29/2018  2/26/2020   Total Score 1 4 6           Suicide Assessment Five-step Evaluation and Treatment (SAFE-T)      Current Outpatient Medications   Medication Sig Dispense Refill     aspirin 325 MG tablet Take 1 tablet (325 mg) by mouth daily 180 tablet      atorvastatin (LIPITOR) 40 MG tablet TAKE 1 TABLET BY MOUTH DAILY - GENERIC FOR LIPITOR 90 tablet 3     buPROPion (WELLBUTRIN XL) 300 MG 24 hr tablet TAKE 1 TABLET BY MOUTH EVERY DAY *NEEDS TO BE SEEN FOR FUTURE FILLS* 90 tablet 3     dexamethasone (DECADRON) 4 MG tablet Take 1 tablet by mouth two times daily for three days beginning the day prior to chemotherapy.       EPINEPHrine (EPIPEN/ADRENACLICK/OR ANY BX GENERIC EQUIV) 0.3 MG/0.3ML injection 2-pack Inject 0.3 mLs (0.3 mg) into the muscle as needed for anaphylaxis 0.6 mL 3     folic acid (FOLVITE) 1 MG tablet Take 1 tablet (800 mcg) once daily starting 5-7 days before first pemetrexed treatment and continuing 21 days after last dose.       levothyroxine (SYNTHROID/LEVOTHROID) 100 MCG tablet Take 100 mcg by mouth       lisinopril (ZESTRIL) 10 MG tablet TAKE 1 TABLET BY MOUTH DAILY- GENERIC FOR ZESTRIL 90 tablet 3     metoprolol tartrate (LOPRESSOR) 25 MG tablet TAKE 1 TABLET BY MOUTH TWICE A  tablet 3     nitroglycerin (NITROSTAT) 0.4 MG sublingual tablet PLACE 1 TABLET UNDER THE TONGUE EVERY FIVE MINUTES AS NEEDED FOR CHEST PAIN. DO NOT CRUSH; MAXIMUM OF 3 DOSES IN 15 MINUTES. 25 tablet 5     Omega-3 Fatty Acids (OMEGA-3 FISH OIL PO) Take 1 g by mouth       omeprazole (PRILOSEC) 40 MG DR capsule TAKE 1 CAPSULE BY MOUTH ONCE DAILY. TAKE 30 TO 60 MINUTES BEFORE A MEAL. 90 capsule 3     prochlorperazine (COMPAZINE) 10 MG tablet Take 10 mg by mouth       Allergies   Allergen Reactions     Codeine GI Disturbance and Nausea     Upsets stomach       No Clinical Screening - See Comments      Shellfish-Derived Products Other (See Comments) and Hives     Felt tingly and had hives.  Hives/ nasal congestion and  "swelling        Hypertension Follow-up      Do you check your blood pressure regularly outside of the clinic? No     Are you following a low salt diet? Yes    Are your blood pressures ever more than 140 on the top number (systolic) OR more   than 90 on the bottom number (diastolic), for example 140/90? No    In last year - found to have left lung cancer - s/p cyberknife.  Different lung cancer found in Right lung---  chemo therapy thru Dr Ayala   Reviewed and updated as needed this visit by Provider         Review of Systems   ROS COMP: Constitutional, HEENT, cardiovascular, pulmonary, gi and gu systems are negative, except as otherwise noted.      Objective    /78   Pulse 73   Temp 97.6  F (36.4  C)   Ht 1.74 m (5' 8.5\")   Wt 95.3 kg (210 lb)   SpO2 98%   BMI 31.47 kg/m    Body mass index is 31.47 kg/m .  Physical Exam   GENERAL: healthy, alert and no distress  HENT: ear canals and TM's normal, nose and mouth without ulcers or lesions  NECK: no adenopathy, no asymmetry, masses, or scars and thyroid normal to palpation  RESP: lungs clear to auscultation - no rales, rhonchi or wheezes  CV: regular rate and rhythm, normal S1 S2, no S3 or S4, no murmur, click or rub, no peripheral edema and peripheral pulses strong  ABDOMEN: soft, nontender, no hepatosplenomegaly, no masses and bowel sounds normal  MS: no gross musculoskeletal defects noted, no edema  PSYCH: mentation appears normal, affect normal/bright    Results for orders placed or performed in visit on 02/26/20   Hemoglobin A1c     Status: Abnormal   Result Value Ref Range    Hemoglobin A1C 6.3 (H) 0 - 5.6 %   PSA tumor marker     Status: None   Result Value Ref Range    PSA 0.72 0 - 4 ug/L   Lipid Profile     Status: None   Result Value Ref Range    Cholesterol 164 <200 mg/dL    Triglycerides 110 <150 mg/dL    HDL Cholesterol 67 >39 mg/dL    LDL Cholesterol Calculated 75 <100 mg/dL    Non HDL Cholesterol 97 <130 mg/dL   Comprehensive metabolic panel "     Status: Abnormal   Result Value Ref Range    Sodium 139 133 - 144 mmol/L    Potassium 4.1 3.4 - 5.3 mmol/L    Chloride 104 94 - 109 mmol/L    Carbon Dioxide 27 20 - 32 mmol/L    Anion Gap 8 3 - 14 mmol/L    Glucose 104 (H) 70 - 99 mg/dL    Urea Nitrogen 21 7 - 30 mg/dL    Creatinine 1.10 0.66 - 1.25 mg/dL    GFR Estimate 71 >60 mL/min/[1.73_m2]    GFR Estimate If Black 83 >60 mL/min/[1.73_m2]    Calcium 8.8 8.5 - 10.1 mg/dL    Bilirubin Total 0.5 0.2 - 1.3 mg/dL    Albumin 3.7 3.4 - 5.0 g/dL    Protein Total 7.7 6.8 - 8.8 g/dL    Alkaline Phosphatase 129 40 - 150 U/L     (H) 0 - 70 U/L     (H) 0 - 45 U/L   CBC with platelets differential     Status: Abnormal   Result Value Ref Range    WBC 2.1 (L) 4.0 - 11.0 10e9/L    RBC Count 2.88 (L) 4.4 - 5.9 10e12/L    Hemoglobin 10.3 (L) 13.3 - 17.7 g/dL    Hematocrit 30.1 (L) 40.0 - 53.0 %     (H) 78 - 100 fl    MCH 35.8 (H) 26.5 - 33.0 pg    MCHC 34.2 31.5 - 36.5 g/dL    RDW 17.1 (H) 10.0 - 15.0 %    Platelet Count 136 (L) 150 - 450 10e9/L    Diff Method Automated Method     % Neutrophils 22.4 %    % Lymphocytes 53.3 %    % Monocytes 19.5 %    % Eosinophils 2.4 %    % Basophils 0.5 %    % Immature Granulocytes 1.9 %    Nucleated RBCs 0 0 /100    Absolute Neutrophil 0.5 (L) 1.6 - 8.3 10e9/L    Absolute Lymphocytes 1.1 0.8 - 5.3 10e9/L    Absolute Monocytes 0.4 0.0 - 1.3 10e9/L    Absolute Eosinophils 0.1 0.0 - 0.7 10e9/L    Absolute Basophils 0.0 0.0 - 0.2 10e9/L    Abs Immature Granulocytes 0.0 0 - 0.4 10e9/L    Absolute Nucleated RBC 0.0    Estimated Average Glucose     Status: None   Result Value Ref Range    Estimated Average Glucose 134 mg/dL             Assessment & Plan     1. Mixed hyperlipidemia  Stable on meds. RF done. Continue current medications and behavioral changes.   - Comprehensive metabolic panel; Future  - Lipid Profile; Future  - atorvastatin (LIPITOR) 40 MG tablet; TAKE 1 TABLET BY MOUTH DAILY - GENERIC FOR LIPITOR  Dispense: 90  tablet; Refill: 3  - lisinopril (ZESTRIL) 10 MG tablet; TAKE 1 TABLET BY MOUTH DAILY- GENERIC FOR ZESTRIL  Dispense: 90 tablet; Refill: 3    2. Essential hypertension with goal blood pressure less than 140/90  Stable on meds. Continue current medications and behavioral changes.   - CBC with platelets differential; Future  - Comprehensive metabolic panel; Future  - metoprolol tartrate (LOPRESSOR) 25 MG tablet; TAKE 1 TABLET BY MOUTH TWICE A DAY  Dispense: 180 tablet; Refill: 3    3. Gastroesophageal reflux disease without esophagitis  Stable - needs long term PPI  - CBC with platelets differential; Future  - Comprehensive metabolic panel; Future  - omeprazole (PRILOSEC) 40 MG DR capsule; TAKE 1 CAPSULE BY MOUTH ONCE DAILY. TAKE 30 TO 60 MINUTES BEFORE A MEAL.  Dispense: 90 capsule; Refill: 3    4. Nocturia  Mild - psa ok   - PSA tumor marker; Future    5. Prediabetes  Discussed again. Watch carbs and keep wt off   - Comprehensive metabolic panel; Future  - Hemoglobin A1c; Future    6. Primary adenocarcinoma of upper lobe of left lung (H)  Treated and resolved with cyberknife - pt updated me on this     7. Atherosclerosis of native coronary artery of native heart without angina pectoris  Stable - no angina     8. Adjustment disorder with depressed mood  Overall well compensated with all that is going on. RF done. Need to stay on current med  - buPROPion (WELLBUTRIN XL) 300 MG 24 hr tablet; TAKE 1 TABLET BY MOUTH EVERY DAY *NEEDS TO BE SEEN FOR FUTURE FILLS*  Dispense: 90 tablet; Refill: 3    9. Malignant neoplasm of overlapping sites of right lung (H)  Up dated on this. Sounds to be stage 4-- On chemo - will follow     10. Acquired hypothyroidism  Stable on meds     11. Antineoplastic chemotherapy induced pancytopenia (H)  CBC [facility] draw    12. Elevated LFTs  Probably due to chemo--- CT in past - no liver masses      45 minutes was spent with patient and over 50%  of this time was spent on  "counseling patient regarding  illness, medication and / or treatment  options, coordinating further cares and follow ups that are needed along with resource material that will be helpful in the treatment of these issues.   Good pep talk on taking it day by day and living with cancer.        BMI:   Estimated body mass index is 31.47 kg/m  as calculated from the following:    Height as of this encounter: 1.74 m (5' 8.5\").    Weight as of this encounter: 95.3 kg (210 lb).               No follow-ups on file.    Abdulkadir Huizar MD  [facility] [place]      "

## 2020-02-24 DIAGNOSIS — F43.21 ADJUSTMENT DISORDER WITH DEPRESSED MOOD: ICD-10-CM

## 2020-02-24 RX ORDER — BUPROPION HYDROCHLORIDE 300 MG/1
TABLET ORAL
Qty: 30 TABLET | Refills: 0 | Status: SHIPPED | OUTPATIENT
Start: 2020-02-24 | End: 2020-02-26

## 2020-02-26 ENCOUNTER — OFFICE VISIT (OUTPATIENT)
Dept: FAMILY MEDICINE | Facility: OTHER | Age: 65
End: 2020-02-26
Attending: FAMILY MEDICINE
Payer: COMMERCIAL

## 2020-02-26 VITALS
HEIGHT: 69 IN | WEIGHT: 210 LBS | DIASTOLIC BLOOD PRESSURE: 78 MMHG | HEART RATE: 73 BPM | BODY MASS INDEX: 31.1 KG/M2 | OXYGEN SATURATION: 98 % | SYSTOLIC BLOOD PRESSURE: 112 MMHG | TEMPERATURE: 97.6 F

## 2020-02-26 DIAGNOSIS — R35.1 NOCTURIA: ICD-10-CM

## 2020-02-26 DIAGNOSIS — I25.10 ATHEROSCLEROSIS OF NATIVE CORONARY ARTERY OF NATIVE HEART WITHOUT ANGINA PECTORIS: ICD-10-CM

## 2020-02-26 DIAGNOSIS — F43.21 ADJUSTMENT DISORDER WITH DEPRESSED MOOD: ICD-10-CM

## 2020-02-26 DIAGNOSIS — R79.89 ELEVATED LFTS: ICD-10-CM

## 2020-02-26 DIAGNOSIS — C34.12 PRIMARY ADENOCARCINOMA OF UPPER LOBE OF LEFT LUNG (H): ICD-10-CM

## 2020-02-26 DIAGNOSIS — K21.9 GASTROESOPHAGEAL REFLUX DISEASE WITHOUT ESOPHAGITIS: ICD-10-CM

## 2020-02-26 DIAGNOSIS — D61.810 ANTINEOPLASTIC CHEMOTHERAPY INDUCED PANCYTOPENIA (H): ICD-10-CM

## 2020-02-26 DIAGNOSIS — I10 ESSENTIAL HYPERTENSION WITH GOAL BLOOD PRESSURE LESS THAN 140/90: ICD-10-CM

## 2020-02-26 DIAGNOSIS — T45.1X5A ANTINEOPLASTIC CHEMOTHERAPY INDUCED PANCYTOPENIA (H): ICD-10-CM

## 2020-02-26 DIAGNOSIS — R73.03 PREDIABETES: ICD-10-CM

## 2020-02-26 DIAGNOSIS — E03.9 ACQUIRED HYPOTHYROIDISM: ICD-10-CM

## 2020-02-26 DIAGNOSIS — E78.2 MIXED HYPERLIPIDEMIA: ICD-10-CM

## 2020-02-26 DIAGNOSIS — C34.81 MALIGNANT NEOPLASM OF OVERLAPPING SITES OF RIGHT LUNG (H): ICD-10-CM

## 2020-02-26 DIAGNOSIS — E78.2 MIXED HYPERLIPIDEMIA: Primary | ICD-10-CM

## 2020-02-26 LAB
ALBUMIN SERPL-MCNC: 3.7 G/DL (ref 3.4–5)
ALP SERPL-CCNC: 129 U/L (ref 40–150)
ALT SERPL W P-5'-P-CCNC: 154 U/L (ref 0–70)
ANION GAP SERPL CALCULATED.3IONS-SCNC: 8 MMOL/L (ref 3–14)
AST SERPL W P-5'-P-CCNC: 104 U/L (ref 0–45)
BASOPHILS # BLD AUTO: 0 10E9/L (ref 0–0.2)
BASOPHILS NFR BLD AUTO: 0.5 %
BILIRUB SERPL-MCNC: 0.5 MG/DL (ref 0.2–1.3)
BUN SERPL-MCNC: 21 MG/DL (ref 7–30)
CALCIUM SERPL-MCNC: 8.8 MG/DL (ref 8.5–10.1)
CHLORIDE SERPL-SCNC: 104 MMOL/L (ref 94–109)
CHOLEST SERPL-MCNC: 164 MG/DL
CO2 SERPL-SCNC: 27 MMOL/L (ref 20–32)
CREAT SERPL-MCNC: 1.1 MG/DL (ref 0.66–1.25)
DIFFERENTIAL METHOD BLD: ABNORMAL
EOSINOPHIL # BLD AUTO: 0.1 10E9/L (ref 0–0.7)
EOSINOPHIL NFR BLD AUTO: 2.4 %
ERYTHROCYTE [DISTWIDTH] IN BLOOD BY AUTOMATED COUNT: 17.1 % (ref 10–15)
EST. AVERAGE GLUCOSE BLD GHB EST-MCNC: 134 MG/DL
GFR SERPL CREATININE-BSD FRML MDRD: 71 ML/MIN/{1.73_M2}
GLUCOSE SERPL-MCNC: 104 MG/DL (ref 70–99)
HBA1C MFR BLD: 6.3 % (ref 0–5.6)
HCT VFR BLD AUTO: 30.1 % (ref 40–53)
HDLC SERPL-MCNC: 67 MG/DL
HGB BLD-MCNC: 10.3 G/DL (ref 13.3–17.7)
IMM GRANULOCYTES # BLD: 0 10E9/L (ref 0–0.4)
IMM GRANULOCYTES NFR BLD: 1.9 %
LDLC SERPL CALC-MCNC: 75 MG/DL
LYMPHOCYTES # BLD AUTO: 1.1 10E9/L (ref 0.8–5.3)
LYMPHOCYTES NFR BLD AUTO: 53.3 %
MCH RBC QN AUTO: 35.8 PG (ref 26.5–33)
MCHC RBC AUTO-ENTMCNC: 34.2 G/DL (ref 31.5–36.5)
MCV RBC AUTO: 105 FL (ref 78–100)
MONOCYTES # BLD AUTO: 0.4 10E9/L (ref 0–1.3)
MONOCYTES NFR BLD AUTO: 19.5 %
NEUTROPHILS # BLD AUTO: 0.5 10E9/L (ref 1.6–8.3)
NEUTROPHILS NFR BLD AUTO: 22.4 %
NONHDLC SERPL-MCNC: 97 MG/DL
NRBC # BLD AUTO: 0 10*3/UL
NRBC BLD AUTO-RTO: 0 /100
PLATELET # BLD AUTO: 136 10E9/L (ref 150–450)
POTASSIUM SERPL-SCNC: 4.1 MMOL/L (ref 3.4–5.3)
PROT SERPL-MCNC: 7.7 G/DL (ref 6.8–8.8)
PSA SERPL-MCNC: 0.72 UG/L (ref 0–4)
RBC # BLD AUTO: 2.88 10E12/L (ref 4.4–5.9)
SODIUM SERPL-SCNC: 139 MMOL/L (ref 133–144)
TRIGL SERPL-MCNC: 110 MG/DL
WBC # BLD AUTO: 2.1 10E9/L (ref 4–11)

## 2020-02-26 PROCEDURE — 36415 COLL VENOUS BLD VENIPUNCTURE: CPT | Performed by: FAMILY MEDICINE

## 2020-02-26 PROCEDURE — 80061 LIPID PANEL: CPT | Performed by: FAMILY MEDICINE

## 2020-02-26 PROCEDURE — 99215 OFFICE O/P EST HI 40 MIN: CPT | Performed by: FAMILY MEDICINE

## 2020-02-26 PROCEDURE — 84153 ASSAY OF PSA TOTAL: CPT | Performed by: FAMILY MEDICINE

## 2020-02-26 PROCEDURE — 85025 COMPLETE CBC W/AUTO DIFF WBC: CPT | Performed by: FAMILY MEDICINE

## 2020-02-26 PROCEDURE — 80053 COMPREHEN METABOLIC PANEL: CPT | Performed by: FAMILY MEDICINE

## 2020-02-26 PROCEDURE — 83036 HEMOGLOBIN GLYCOSYLATED A1C: CPT | Performed by: FAMILY MEDICINE

## 2020-02-26 RX ORDER — OMEPRAZOLE 40 MG/1
CAPSULE, DELAYED RELEASE ORAL
Qty: 90 CAPSULE | Refills: 3 | Status: SHIPPED | OUTPATIENT
Start: 2020-02-26 | End: 2020-04-16

## 2020-02-26 RX ORDER — DEXAMETHASONE 4 MG/1
TABLET ORAL
COMMUNITY
Start: 2019-10-15 | End: 2021-02-15

## 2020-02-26 RX ORDER — FOLIC ACID 1 MG/1
TABLET ORAL
COMMUNITY
Start: 2019-10-15 | End: 2021-02-15

## 2020-02-26 RX ORDER — BUPROPION HYDROCHLORIDE 300 MG/1
TABLET ORAL
Qty: 90 TABLET | Refills: 3 | Status: SHIPPED | OUTPATIENT
Start: 2020-02-26 | End: 2020-04-16

## 2020-02-26 RX ORDER — METOPROLOL TARTRATE 25 MG/1
TABLET, FILM COATED ORAL
Qty: 180 TABLET | Refills: 3 | Status: SHIPPED | OUTPATIENT
Start: 2020-02-26 | End: 2020-04-16

## 2020-02-26 RX ORDER — LISINOPRIL 10 MG/1
TABLET ORAL
Qty: 90 TABLET | Refills: 3 | Status: SHIPPED | OUTPATIENT
Start: 2020-02-26 | End: 2020-04-16

## 2020-02-26 RX ORDER — LEVOTHYROXINE SODIUM 100 UG/1
125 TABLET ORAL
COMMUNITY
Start: 2020-02-03 | End: 2021-02-15 | Stop reason: DRUGHIGH

## 2020-02-26 RX ORDER — PROCHLORPERAZINE MALEATE 10 MG
10 TABLET ORAL
COMMUNITY
Start: 2019-10-15

## 2020-02-26 RX ORDER — ATORVASTATIN CALCIUM 40 MG/1
TABLET, FILM COATED ORAL
Qty: 90 TABLET | Refills: 3 | Status: SHIPPED | OUTPATIENT
Start: 2020-02-26 | End: 2020-04-16

## 2020-02-26 ASSESSMENT — ANXIETY QUESTIONNAIRES
4. TROUBLE RELAXING: SEVERAL DAYS
1. FEELING NERVOUS, ANXIOUS, OR ON EDGE: SEVERAL DAYS
GAD7 TOTAL SCORE: 6
5. BEING SO RESTLESS THAT IT IS HARD TO SIT STILL: SEVERAL DAYS
IF YOU CHECKED OFF ANY PROBLEMS ON THIS QUESTIONNAIRE, HOW DIFFICULT HAVE THESE PROBLEMS MADE IT FOR YOU TO DO YOUR WORK, TAKE CARE OF THINGS AT HOME, OR GET ALONG WITH OTHER PEOPLE: NOT DIFFICULT AT ALL
2. NOT BEING ABLE TO STOP OR CONTROL WORRYING: SEVERAL DAYS
3. WORRYING TOO MUCH ABOUT DIFFERENT THINGS: SEVERAL DAYS
6. BECOMING EASILY ANNOYED OR IRRITABLE: SEVERAL DAYS
7. FEELING AFRAID AS IF SOMETHING AWFUL MIGHT HAPPEN: NOT AT ALL

## 2020-02-26 ASSESSMENT — PAIN SCALES - GENERAL: PAINLEVEL: NO PAIN (0)

## 2020-02-26 ASSESSMENT — MIFFLIN-ST. JEOR: SCORE: 1734.99

## 2020-02-26 ASSESSMENT — PATIENT HEALTH QUESTIONNAIRE - PHQ9: SUM OF ALL RESPONSES TO PHQ QUESTIONS 1-9: 8

## 2020-02-26 NOTE — NURSING NOTE
"Chief Complaint   Patient presents with     Lipids       Initial /78   Pulse 73   Temp 97.6  F (36.4  C)   Ht 1.74 m (5' 8.5\")   Wt 95.3 kg (210 lb)   SpO2 98%   BMI 31.47 kg/m   Estimated body mass index is 31.47 kg/m  as calculated from the following:    Height as of this encounter: 1.74 m (5' 8.5\").    Weight as of this encounter: 95.3 kg (210 lb).  Medication Reconciliation: complete  Calvin Torre LPN  "

## 2020-02-27 ASSESSMENT — ANXIETY QUESTIONNAIRES: GAD7 TOTAL SCORE: 6

## 2020-04-16 DIAGNOSIS — K21.9 GASTROESOPHAGEAL REFLUX DISEASE WITHOUT ESOPHAGITIS: ICD-10-CM

## 2020-04-16 DIAGNOSIS — I10 ESSENTIAL HYPERTENSION WITH GOAL BLOOD PRESSURE LESS THAN 140/90: ICD-10-CM

## 2020-04-16 DIAGNOSIS — E78.2 MIXED HYPERLIPIDEMIA: ICD-10-CM

## 2020-04-16 DIAGNOSIS — F43.21 ADJUSTMENT DISORDER WITH DEPRESSED MOOD: ICD-10-CM

## 2020-04-16 RX ORDER — BUPROPION HYDROCHLORIDE 300 MG/1
TABLET ORAL
Qty: 90 TABLET | Refills: 3 | Status: SHIPPED | OUTPATIENT
Start: 2020-04-16 | End: 2021-01-01

## 2020-04-16 RX ORDER — LISINOPRIL 10 MG/1
TABLET ORAL
Qty: 90 TABLET | Refills: 3 | Status: SHIPPED | OUTPATIENT
Start: 2020-04-16 | End: 2021-01-01

## 2020-04-16 RX ORDER — OMEPRAZOLE 40 MG/1
CAPSULE, DELAYED RELEASE ORAL
Qty: 90 CAPSULE | Refills: 3 | Status: SHIPPED | OUTPATIENT
Start: 2020-04-16 | End: 2021-01-01

## 2020-04-16 RX ORDER — ATORVASTATIN CALCIUM 40 MG/1
TABLET, FILM COATED ORAL
Qty: 90 TABLET | Refills: 3 | Status: SHIPPED | OUTPATIENT
Start: 2020-04-16 | End: 2021-01-01

## 2020-04-16 RX ORDER — METOPROLOL TARTRATE 25 MG/1
TABLET, FILM COATED ORAL
Qty: 180 TABLET | Refills: 3 | Status: SHIPPED | OUTPATIENT
Start: 2020-04-16 | End: 2021-01-01

## 2020-04-16 NOTE — TELEPHONE ENCOUNTER
buPROPion (WELLBUTRIN XL) 300 MG 24 hr tablet     serotonin specific reuptake inhibitor protocol failed due to:     PHQ-9 score less than 5 in past 6 months    PHQ 7/13/2018 11/29/2018 2/26/2020   PHQ-9 Total Score 0 1 8   Q9: Thoughts of better off dead/self-harm past 2 weeks Not at all Not at all Not at all

## 2020-04-16 NOTE — TELEPHONE ENCOUNTER
Patient requesting a 90  Day supply to [facility] in [place].       lisinopril (ZESTRIL) 10 MG tablet        Last Written Prescription Date:  2/26/20  Last Fill Quantity: 90,   # refills: 3  Last Office Visit: 2/26/20  Future Office visit:       Routing refill request to provider for review/approval because:      metoprolol tartrate (LOPRESSOR) 25 MG tablet         Last Written Prescription Date:  2/26/20  Last Fill Quantity: 180,   # refills: 3  Last Office Visit: 2/26/20  Future Office visit:       Routing refill request to provider for review/approval because:      buPROPion (WELLBUTRIN XL) 300 MG 24 hr tablet         Last Written Prescription Date:  2/26/20  Last Fill Quantity: 90,   # refills: 3  Last Office Visit: 2/26/20  Future Office visit:       Routing refill request to provider for review/approval because:    omeprazole (PRILOSEC) 40 MG DR capsule         Last Written Prescription Date:  2/26/20  Last Fill Quantity: 90,   # refills: 3  Last Office Visit: 2/26/20  Future Office visit:       Routing refill request to provider for review/approval because:      atorvastatin (LIPITOR) 40 MG tablet         Last Written Prescription Date:  2/26/20  Last Fill Quantity: 90,   # refills: 3  Last Office Visit: 2/26/20  Future Office visit:       Routing refill request to provider for review/approval because:

## 2020-08-11 ENCOUNTER — TRANSFERRED RECORDS (OUTPATIENT)
Dept: HEALTH INFORMATION MANAGEMENT | Facility: CLINIC | Age: 65
End: 2020-08-11

## 2020-08-14 ENCOUNTER — TRANSFERRED RECORDS (OUTPATIENT)
Dept: HEALTH INFORMATION MANAGEMENT | Facility: CLINIC | Age: 65
End: 2020-08-14

## 2020-08-14 LAB — EJECTION FRACTION: 61.9 %

## 2021-01-01 ENCOUNTER — LAB (OUTPATIENT)
Dept: LAB | Facility: OTHER | Age: 66
End: 2021-01-01
Attending: FAMILY MEDICINE
Payer: MEDICARE

## 2021-01-01 ENCOUNTER — TELEPHONE (OUTPATIENT)
Dept: FAMILY MEDICINE | Facility: OTHER | Age: 66
End: 2021-01-01

## 2021-01-01 ENCOUNTER — OFFICE VISIT (OUTPATIENT)
Dept: FAMILY MEDICINE | Facility: OTHER | Age: 66
End: 2021-01-01
Attending: FAMILY MEDICINE
Payer: COMMERCIAL

## 2021-01-01 ENCOUNTER — TRANSFERRED RECORDS (OUTPATIENT)
Dept: HEALTH INFORMATION MANAGEMENT | Facility: CLINIC | Age: 66
End: 2021-01-01

## 2021-01-01 ENCOUNTER — OFFICE VISIT (OUTPATIENT)
Dept: FAMILY MEDICINE | Facility: OTHER | Age: 66
End: 2021-01-01
Attending: ORTHOPAEDIC SURGERY
Payer: COMMERCIAL

## 2021-01-01 ENCOUNTER — HOSPITAL ENCOUNTER (OUTPATIENT)
Dept: ULTRASOUND IMAGING | Facility: HOSPITAL | Age: 66
Discharge: HOME | End: 2021-05-20
Attending: FAMILY MEDICINE | Admitting: FAMILY MEDICINE
Payer: COMMERCIAL

## 2021-01-01 ENCOUNTER — OFFICE VISIT (OUTPATIENT)
Dept: FAMILY MEDICINE | Facility: OTHER | Age: 66
End: 2021-01-01
Attending: FAMILY MEDICINE
Payer: MEDICARE

## 2021-01-01 VITALS
OXYGEN SATURATION: 98 % | HEIGHT: 69 IN | TEMPERATURE: 98 F | DIASTOLIC BLOOD PRESSURE: 72 MMHG | SYSTOLIC BLOOD PRESSURE: 122 MMHG | WEIGHT: 202 LBS | HEART RATE: 62 BPM | BODY MASS INDEX: 29.92 KG/M2

## 2021-01-01 VITALS
HEART RATE: 72 BPM | OXYGEN SATURATION: 98 % | WEIGHT: 210 LBS | SYSTOLIC BLOOD PRESSURE: 116 MMHG | BODY MASS INDEX: 31.47 KG/M2 | TEMPERATURE: 97.1 F | DIASTOLIC BLOOD PRESSURE: 58 MMHG

## 2021-01-01 VITALS
SYSTOLIC BLOOD PRESSURE: 110 MMHG | OXYGEN SATURATION: 96 % | HEIGHT: 69 IN | HEART RATE: 64 BPM | WEIGHT: 202 LBS | TEMPERATURE: 97.9 F | DIASTOLIC BLOOD PRESSURE: 64 MMHG | BODY MASS INDEX: 29.92 KG/M2

## 2021-01-01 VITALS
SYSTOLIC BLOOD PRESSURE: 134 MMHG | HEIGHT: 69 IN | DIASTOLIC BLOOD PRESSURE: 70 MMHG | WEIGHT: 204 LBS | HEART RATE: 76 BPM | TEMPERATURE: 98.5 F | BODY MASS INDEX: 30.21 KG/M2 | OXYGEN SATURATION: 98 %

## 2021-01-01 DIAGNOSIS — M16.12 PRIMARY OSTEOARTHRITIS OF LEFT HIP: ICD-10-CM

## 2021-01-01 DIAGNOSIS — D63.0 ANEMIA IN NEOPLASTIC DISEASE: ICD-10-CM

## 2021-01-01 DIAGNOSIS — E03.9 ACQUIRED HYPOTHYROIDISM: ICD-10-CM

## 2021-01-01 DIAGNOSIS — R73.03 PREDIABETES: ICD-10-CM

## 2021-01-01 DIAGNOSIS — Z96.641 STATUS POST RIGHT HIP REPLACEMENT: ICD-10-CM

## 2021-01-01 DIAGNOSIS — C34.81 MALIGNANT NEOPLASM OF OVERLAPPING SITES OF RIGHT LUNG (H): ICD-10-CM

## 2021-01-01 DIAGNOSIS — I10 ESSENTIAL HYPERTENSION WITH GOAL BLOOD PRESSURE LESS THAN 140/90: ICD-10-CM

## 2021-01-01 DIAGNOSIS — N18.31 STAGE 3A CHRONIC KIDNEY DISEASE (H): ICD-10-CM

## 2021-01-01 DIAGNOSIS — E03.9 ACQUIRED HYPOTHYROIDISM: Primary | ICD-10-CM

## 2021-01-01 DIAGNOSIS — Z01.818 PREOP GENERAL PHYSICAL EXAM: Primary | ICD-10-CM

## 2021-01-01 DIAGNOSIS — K21.9 GASTROESOPHAGEAL REFLUX DISEASE WITHOUT ESOPHAGITIS: ICD-10-CM

## 2021-01-01 DIAGNOSIS — Z20.822 COVID-19 RULED OUT: Primary | ICD-10-CM

## 2021-01-01 DIAGNOSIS — I25.10 CORONARY ARTERY DISEASE INVOLVING NATIVE CORONARY ARTERY OF NATIVE HEART WITHOUT ANGINA PECTORIS: ICD-10-CM

## 2021-01-01 DIAGNOSIS — I10 ESSENTIAL HYPERTENSION: Primary | ICD-10-CM

## 2021-01-01 DIAGNOSIS — F43.21 ADJUSTMENT DISORDER WITH DEPRESSED MOOD: ICD-10-CM

## 2021-01-01 DIAGNOSIS — S39.012A LOW BACK STRAIN, INITIAL ENCOUNTER: ICD-10-CM

## 2021-01-01 DIAGNOSIS — E78.2 MIXED HYPERLIPIDEMIA: ICD-10-CM

## 2021-01-01 DIAGNOSIS — N18.31 STAGE 3A CHRONIC KIDNEY DISEASE (H): Primary | ICD-10-CM

## 2021-01-01 DIAGNOSIS — I10 ESSENTIAL HYPERTENSION WITH GOAL BLOOD PRESSURE LESS THAN 140/90: Primary | ICD-10-CM

## 2021-01-01 LAB
ANION GAP SERPL CALCULATED.3IONS-SCNC: 4 MMOL/L (ref 3–14)
ANION GAP SERPL CALCULATED.3IONS-SCNC: 5 MMOL/L (ref 3–14)
ANION GAP SERPL CALCULATED.3IONS-SCNC: 6 MMOL/L (ref 3–14)
BASOPHILS # BLD AUTO: 0 10E3/UL (ref 0–0.2)
BASOPHILS # BLD AUTO: 0 10E9/L (ref 0–0.2)
BASOPHILS # BLD AUTO: 0 10E9/L (ref 0–0.2)
BASOPHILS NFR BLD AUTO: 0.6 %
BASOPHILS NFR BLD AUTO: 0.7 %
BASOPHILS NFR BLD AUTO: 1 %
BUN SERPL-MCNC: 20 MG/DL (ref 7–30)
BUN SERPL-MCNC: 21 MG/DL (ref 7–30)
BUN SERPL-MCNC: 25 MG/DL (ref 7–30)
CALCIUM SERPL-MCNC: 9.1 MG/DL (ref 8.5–10.1)
CALCIUM SERPL-MCNC: 9.3 MG/DL (ref 8.5–10.1)
CALCIUM SERPL-MCNC: 9.7 MG/DL (ref 8.5–10.1)
CHLORIDE BLD-SCNC: 107 MMOL/L (ref 94–109)
CHLORIDE SERPL-SCNC: 106 MMOL/L (ref 94–109)
CHLORIDE SERPL-SCNC: 108 MMOL/L (ref 94–109)
CO2 SERPL-SCNC: 24 MMOL/L (ref 20–32)
CO2 SERPL-SCNC: 25 MMOL/L (ref 20–32)
CO2 SERPL-SCNC: 26 MMOL/L (ref 20–32)
CREAT SERPL-MCNC: 1.33 MG/DL (ref 0.66–1.25)
CREAT SERPL-MCNC: 1.35 MG/DL (ref 0.66–1.25)
CREAT SERPL-MCNC: 1.46 MG/DL (ref 0.66–1.25)
DIFFERENTIAL METHOD BLD: ABNORMAL
DIFFERENTIAL METHOD BLD: ABNORMAL
EOSINOPHIL # BLD AUTO: 0.1 10E3/UL (ref 0–0.7)
EOSINOPHIL # BLD AUTO: 0.1 10E9/L (ref 0–0.7)
EOSINOPHIL # BLD AUTO: 0.2 10E9/L (ref 0–0.7)
EOSINOPHIL NFR BLD AUTO: 1.3 %
EOSINOPHIL NFR BLD AUTO: 2 %
EOSINOPHIL NFR BLD AUTO: 3.1 %
ERYTHROCYTE [DISTWIDTH] IN BLOOD BY AUTOMATED COUNT: 13 % (ref 10–15)
ERYTHROCYTE [DISTWIDTH] IN BLOOD BY AUTOMATED COUNT: 14.5 % (ref 10–15)
ERYTHROCYTE [DISTWIDTH] IN BLOOD BY AUTOMATED COUNT: 18.7 % (ref 10–15)
GFR SERPL CREATININE-BSD FRML MDRD: 50 ML/MIN/{1.73_M2}
GFR SERPL CREATININE-BSD FRML MDRD: 55 ML/MIN/{1.73_M2}
GFR SERPL CREATININE-BSD FRML MDRD: 56 ML/MIN/1.73M2
GLUCOSE BLD-MCNC: 110 MG/DL (ref 70–99)
GLUCOSE SERPL-MCNC: 116 MG/DL (ref 70–99)
GLUCOSE SERPL-MCNC: 97 MG/DL (ref 70–99)
HCT VFR BLD AUTO: 30.3 % (ref 40–53)
HCT VFR BLD AUTO: 34.5 % (ref 40–53)
HCT VFR BLD AUTO: 36.7 % (ref 40–53)
HGB BLD-MCNC: 10.3 G/DL (ref 13.3–17.7)
HGB BLD-MCNC: 11.2 G/DL (ref 13.3–17.7)
HGB BLD-MCNC: 12.1 G/DL (ref 13.3–17.7)
IMM GRANULOCYTES # BLD: 0 10E9/L (ref 0–0.4)
IMM GRANULOCYTES # BLD: 0 10E9/L (ref 0–0.4)
IMM GRANULOCYTES # BLD: 0.2 10E3/UL
IMM GRANULOCYTES NFR BLD: 0.2 %
IMM GRANULOCYTES NFR BLD: 0.5 %
IMM GRANULOCYTES NFR BLD: 2 %
LABORATORY COMMENT REPORT: NORMAL
LYMPHOCYTES # BLD AUTO: 1.2 10E3/UL (ref 0.8–5.3)
LYMPHOCYTES # BLD AUTO: 1.2 10E9/L (ref 0.8–5.3)
LYMPHOCYTES # BLD AUTO: 1.4 10E9/L (ref 0.8–5.3)
LYMPHOCYTES NFR BLD AUTO: 18 %
LYMPHOCYTES NFR BLD AUTO: 23.1 %
LYMPHOCYTES NFR BLD AUTO: 24.1 %
MCH RBC QN AUTO: 31.9 PG (ref 26.5–33)
MCH RBC QN AUTO: 32.5 PG (ref 26.5–33)
MCH RBC QN AUTO: 34 PG (ref 26.5–33)
MCHC RBC AUTO-ENTMCNC: 32.5 G/DL (ref 31.5–36.5)
MCHC RBC AUTO-ENTMCNC: 33 G/DL (ref 31.5–36.5)
MCHC RBC AUTO-ENTMCNC: 34 G/DL (ref 31.5–36.5)
MCV RBC AUTO: 100 FL (ref 78–100)
MCV RBC AUTO: 100 FL (ref 78–100)
MCV RBC AUTO: 97 FL (ref 78–100)
MONOCYTES # BLD AUTO: 0.7 10E3/UL (ref 0–1.3)
MONOCYTES # BLD AUTO: 0.7 10E9/L (ref 0–1.3)
MONOCYTES # BLD AUTO: 0.8 10E9/L (ref 0–1.3)
MONOCYTES NFR BLD AUTO: 11 %
MONOCYTES NFR BLD AUTO: 12.3 %
MONOCYTES NFR BLD AUTO: 13 %
NEUTROPHILS # BLD AUTO: 3.4 10E9/L (ref 1.6–8.3)
NEUTROPHILS # BLD AUTO: 3.4 10E9/L (ref 1.6–8.3)
NEUTROPHILS # BLD AUTO: 4.3 10E3/UL (ref 1.6–8.3)
NEUTROPHILS NFR BLD AUTO: 58.6 %
NEUTROPHILS NFR BLD AUTO: 62.5 %
NEUTROPHILS NFR BLD AUTO: 66 %
NRBC # BLD AUTO: 0 10*3/UL
NRBC # BLD AUTO: 0 10*3/UL
NRBC # BLD AUTO: 0 10E3/UL
NRBC BLD AUTO-RTO: 0 /100
PLATELET # BLD AUTO: 150 10E3/UL (ref 150–450)
PLATELET # BLD AUTO: 241 10E9/L (ref 150–450)
PLATELET # BLD AUTO: 269 10E9/L (ref 150–450)
POTASSIUM BLD-SCNC: 3.9 MMOL/L (ref 3.4–5.3)
POTASSIUM SERPL-SCNC: 4 MMOL/L (ref 3.4–5.3)
POTASSIUM SERPL-SCNC: 4.7 MMOL/L (ref 3.4–5.3)
RBC # BLD AUTO: 3.03 10E6/UL (ref 4.4–5.9)
RBC # BLD AUTO: 3.45 10E12/L (ref 4.4–5.9)
RBC # BLD AUTO: 3.79 10E12/L (ref 4.4–5.9)
SARS-COV-2 RNA RESP QL NAA+PROBE: NEGATIVE
SARS-COV-2 RNA RESP QL NAA+PROBE: NORMAL
SODIUM SERPL-SCNC: 136 MMOL/L (ref 133–144)
SODIUM SERPL-SCNC: 137 MMOL/L (ref 133–144)
SODIUM SERPL-SCNC: 138 MMOL/L (ref 133–144)
SPECIMEN SOURCE: NORMAL
SPECIMEN SOURCE: NORMAL
TSH SERPL DL<=0.005 MIU/L-ACNC: 2.12 MU/L (ref 0.4–4)
TSH SERPL DL<=0.005 MIU/L-ACNC: NORMAL MU/L (ref 0.4–4)
WBC # BLD AUTO: 5.4 10E9/L (ref 4–11)
WBC # BLD AUTO: 5.8 10E9/L (ref 4–11)
WBC # BLD AUTO: 6.5 10E3/UL (ref 4–11)

## 2021-01-01 PROCEDURE — U0003 INFECTIOUS AGENT DETECTION BY NUCLEIC ACID (DNA OR RNA); SEVERE ACUTE RESPIRATORY SYNDROME CORONAVIRUS 2 (SARS-COV-2) (CORONAVIRUS DISEASE [COVID-19]), AMPLIFIED PROBE TECHNIQUE, MAKING USE OF HIGH THROUGHPUT TECHNOLOGIES AS DESCRIBED BY CMS-2020-01-R: HCPCS | Performed by: FAMILY MEDICINE

## 2021-01-01 PROCEDURE — 84443 ASSAY THYROID STIM HORMONE: CPT | Performed by: FAMILY MEDICINE

## 2021-01-01 PROCEDURE — 99214 OFFICE O/P EST MOD 30 MIN: CPT | Performed by: FAMILY MEDICINE

## 2021-01-01 PROCEDURE — 36415 COLL VENOUS BLD VENIPUNCTURE: CPT | Performed by: FAMILY MEDICINE

## 2021-01-01 PROCEDURE — U0005 INFEC AGEN DETEC AMPLI PROBE: HCPCS | Performed by: FAMILY MEDICINE

## 2021-01-01 PROCEDURE — 80048 BASIC METABOLIC PNL TOTAL CA: CPT | Performed by: FAMILY MEDICINE

## 2021-01-01 PROCEDURE — 85025 COMPLETE CBC W/AUTO DIFF WBC: CPT | Performed by: FAMILY MEDICINE

## 2021-01-01 PROCEDURE — G0463 HOSPITAL OUTPT CLINIC VISIT: HCPCS | Performed by: FAMILY MEDICINE

## 2021-01-01 PROCEDURE — 99213 OFFICE O/P EST LOW 20 MIN: CPT | Performed by: FAMILY MEDICINE

## 2021-01-01 PROCEDURE — 76770 US EXAM ABDO BACK WALL COMP: CPT

## 2021-01-01 PROCEDURE — 85025 COMPLETE CBC W/AUTO DIFF WBC: CPT | Mod: ZL | Performed by: FAMILY MEDICINE

## 2021-01-01 PROCEDURE — 36415 COLL VENOUS BLD VENIPUNCTURE: CPT | Mod: ZL | Performed by: FAMILY MEDICINE

## 2021-01-01 PROCEDURE — 93000 ELECTROCARDIOGRAM COMPLETE: CPT | Performed by: INTERNAL MEDICINE

## 2021-01-01 PROCEDURE — 80048 BASIC METABOLIC PNL TOTAL CA: CPT | Mod: ZL | Performed by: FAMILY MEDICINE

## 2021-01-01 RX ORDER — HYDROCODONE BITARTRATE AND ACETAMINOPHEN 5; 325 MG/1; MG/1
1-2 TABLET ORAL EVERY 8 HOURS PRN
Qty: 25 TABLET | Refills: 0 | Status: SHIPPED | OUTPATIENT
Start: 2021-01-01

## 2021-01-01 RX ORDER — LEVOTHYROXINE SODIUM 137 UG/1
137 TABLET ORAL DAILY
Qty: 60 TABLET | Refills: 0 | Status: SHIPPED | OUTPATIENT
Start: 2021-01-01 | End: 2021-01-01

## 2021-01-01 RX ORDER — BUPROPION HYDROCHLORIDE 300 MG/1
TABLET ORAL
Qty: 90 TABLET | Refills: 3 | Status: SHIPPED | OUTPATIENT
Start: 2021-01-01

## 2021-01-01 RX ORDER — LISINOPRIL 5 MG/1
5 TABLET ORAL DAILY
Qty: 90 TABLET | Refills: 3 | Status: SHIPPED | OUTPATIENT
Start: 2021-01-01

## 2021-01-01 RX ORDER — LEVOTHYROXINE SODIUM 150 UG/1
150 TABLET ORAL DAILY
Qty: 90 TABLET | Refills: 0 | Status: SHIPPED | OUTPATIENT
Start: 2021-01-01 | End: 2021-01-01 | Stop reason: DRUGHIGH

## 2021-01-01 RX ORDER — METOPROLOL TARTRATE 25 MG/1
TABLET, FILM COATED ORAL
Qty: 180 TABLET | Refills: 0 | Status: SHIPPED | OUTPATIENT
Start: 2021-01-01 | End: 2021-01-01

## 2021-01-01 RX ORDER — LEVOTHYROXINE SODIUM 137 UG/1
TABLET ORAL
Qty: 30 TABLET | Refills: 6 | Status: SHIPPED | OUTPATIENT
Start: 2021-01-01

## 2021-01-01 RX ORDER — ATORVASTATIN CALCIUM 40 MG/1
TABLET, FILM COATED ORAL
Qty: 90 TABLET | Refills: 3 | Status: SHIPPED | OUTPATIENT
Start: 2021-01-01

## 2021-01-01 RX ORDER — OMEPRAZOLE 40 MG/1
CAPSULE, DELAYED RELEASE ORAL
Qty: 90 CAPSULE | Refills: 3 | Status: SHIPPED | OUTPATIENT
Start: 2021-01-01

## 2021-01-01 RX ORDER — LEVOTHYROXINE SODIUM 137 UG/1
137 TABLET ORAL DAILY
Qty: 60 TABLET | Refills: 0 | OUTPATIENT
Start: 2021-01-01

## 2021-01-01 RX ORDER — LEVOTHYROXINE SODIUM 137 UG/1
TABLET ORAL
Qty: 60 TABLET | Refills: 0 | Status: SHIPPED | OUTPATIENT
Start: 2021-01-01 | End: 2021-01-01

## 2021-01-01 RX ORDER — METOPROLOL TARTRATE 25 MG/1
TABLET, FILM COATED ORAL
Qty: 180 TABLET | Refills: 2 | Status: SHIPPED | OUTPATIENT
Start: 2021-01-01

## 2021-01-01 RX ORDER — ORPHENADRINE CITRATE 100 MG/1
100 TABLET, EXTENDED RELEASE ORAL 2 TIMES DAILY PRN
Qty: 30 TABLET | Refills: 1 | Status: SHIPPED | OUTPATIENT
Start: 2021-01-01

## 2021-01-01 RX ORDER — LEVOTHYROXINE SODIUM 137 UG/1
137 TABLET ORAL DAILY
Qty: 90 TABLET | Refills: 3 | Status: SHIPPED | OUTPATIENT
Start: 2021-01-01 | End: 2021-01-01

## 2021-01-01 ASSESSMENT — MIFFLIN-ST. JEOR
SCORE: 1693.71
SCORE: 1697.78
SCORE: 1688.71

## 2021-01-01 ASSESSMENT — PAIN SCALES - GENERAL
PAINLEVEL: MODERATE PAIN (4)
PAINLEVEL: MODERATE PAIN (5)
PAINLEVEL: MODERATE PAIN (5)
PAINLEVEL: MILD PAIN (3)

## 2021-01-20 ENCOUNTER — MEDICAL CORRESPONDENCE (OUTPATIENT)
Dept: HEALTH INFORMATION MANAGEMENT | Facility: HOSPITAL | Age: 66
End: 2021-01-20

## 2021-02-01 ENCOUNTER — HOSPITAL ENCOUNTER (OUTPATIENT)
Dept: MRI IMAGING | Facility: HOSPITAL | Age: 66
Discharge: HOME | End: 2021-02-01
Attending: ORTHOPAEDIC SURGERY | Admitting: ORTHOPAEDIC SURGERY
Payer: COMMERCIAL

## 2021-02-01 DIAGNOSIS — Z87.828 HX OF TRAUMA: ICD-10-CM

## 2021-02-01 DIAGNOSIS — M25.551 RIGHT HIP PAIN: ICD-10-CM

## 2021-02-01 DIAGNOSIS — Z92.21 HISTORY OF CHEMOTHERAPY: ICD-10-CM

## 2021-02-01 PROCEDURE — 73721 MRI JNT OF LWR EXTRE W/O DYE: CPT | Mod: RT

## 2021-02-05 ENCOUNTER — TRANSFERRED RECORDS (OUTPATIENT)
Dept: HEALTH INFORMATION MANAGEMENT | Facility: CLINIC | Age: 66
End: 2021-02-05

## 2021-02-15 ENCOUNTER — OFFICE VISIT (OUTPATIENT)
Dept: FAMILY MEDICINE | Facility: OTHER | Age: 66
End: 2021-02-15
Attending: FAMILY MEDICINE
Payer: COMMERCIAL

## 2021-02-15 VITALS
BODY MASS INDEX: 31.1 KG/M2 | TEMPERATURE: 97.5 F | WEIGHT: 210 LBS | HEIGHT: 69 IN | HEART RATE: 74 BPM | OXYGEN SATURATION: 97 % | DIASTOLIC BLOOD PRESSURE: 88 MMHG | SYSTOLIC BLOOD PRESSURE: 136 MMHG

## 2021-02-15 DIAGNOSIS — Z01.818 PREOP GENERAL PHYSICAL EXAM: Primary | ICD-10-CM

## 2021-02-15 DIAGNOSIS — M16.11 PRIMARY OSTEOARTHRITIS OF RIGHT HIP: ICD-10-CM

## 2021-02-15 DIAGNOSIS — C34.12 PRIMARY ADENOCARCINOMA OF UPPER LOBE OF LEFT LUNG (H): ICD-10-CM

## 2021-02-15 DIAGNOSIS — R73.03 PREDIABETES: ICD-10-CM

## 2021-02-15 DIAGNOSIS — F17.201 TOBACCO ABUSE, IN REMISSION: ICD-10-CM

## 2021-02-15 DIAGNOSIS — I10 ESSENTIAL HYPERTENSION: ICD-10-CM

## 2021-02-15 DIAGNOSIS — I25.10 CORONARY ARTERY DISEASE INVOLVING NATIVE CORONARY ARTERY OF NATIVE HEART WITHOUT ANGINA PECTORIS: ICD-10-CM

## 2021-02-15 DIAGNOSIS — E03.9 ACQUIRED HYPOTHYROIDISM: ICD-10-CM

## 2021-02-15 LAB
ALBUMIN UR-MCNC: 10 MG/DL
ANION GAP SERPL CALCULATED.3IONS-SCNC: 5 MMOL/L (ref 3–14)
APPEARANCE UR: CLEAR
BASOPHILS # BLD AUTO: 0 10E9/L (ref 0–0.2)
BASOPHILS NFR BLD AUTO: 0.5 %
BILIRUB UR QL STRIP: NEGATIVE
BUN SERPL-MCNC: 20 MG/DL (ref 7–30)
CALCIUM SERPL-MCNC: 9.4 MG/DL (ref 8.5–10.1)
CHLORIDE SERPL-SCNC: 103 MMOL/L (ref 94–109)
CO2 SERPL-SCNC: 28 MMOL/L (ref 20–32)
COLOR UR AUTO: YELLOW
CREAT SERPL-MCNC: 1.39 MG/DL (ref 0.66–1.25)
DIFFERENTIAL METHOD BLD: ABNORMAL
EOSINOPHIL # BLD AUTO: 0.1 10E9/L (ref 0–0.7)
EOSINOPHIL NFR BLD AUTO: 1.9 %
ERYTHROCYTE [DISTWIDTH] IN BLOOD BY AUTOMATED COUNT: 12 % (ref 10–15)
ERYTHROCYTE [SEDIMENTATION RATE] IN BLOOD BY WESTERGREN METHOD: 35 MM/H (ref 0–20)
GFR SERPL CREATININE-BSD FRML MDRD: 53 ML/MIN/{1.73_M2}
GLUCOSE SERPL-MCNC: 104 MG/DL (ref 70–99)
GLUCOSE UR STRIP-MCNC: NEGATIVE MG/DL
HCT VFR BLD AUTO: 38.5 % (ref 40–53)
HGB BLD-MCNC: 13 G/DL (ref 13.3–17.7)
HGB UR QL STRIP: NEGATIVE
IMM GRANULOCYTES # BLD: 0 10E9/L (ref 0–0.4)
IMM GRANULOCYTES NFR BLD: 0.5 %
KETONES UR STRIP-MCNC: NEGATIVE MG/DL
LEUKOCYTE ESTERASE UR QL STRIP: NEGATIVE
LYMPHOCYTES # BLD AUTO: 1.3 10E9/L (ref 0.8–5.3)
LYMPHOCYTES NFR BLD AUTO: 17.3 %
MCH RBC QN AUTO: 34.5 PG (ref 26.5–33)
MCHC RBC AUTO-ENTMCNC: 33.8 G/DL (ref 31.5–36.5)
MCV RBC AUTO: 102 FL (ref 78–100)
MONOCYTES # BLD AUTO: 0.8 10E9/L (ref 0–1.3)
MONOCYTES NFR BLD AUTO: 10.8 %
NEUTROPHILS # BLD AUTO: 5.2 10E9/L (ref 1.6–8.3)
NEUTROPHILS NFR BLD AUTO: 69 %
NITRATE UR QL: NEGATIVE
NRBC # BLD AUTO: 0 10*3/UL
NRBC BLD AUTO-RTO: 0 /100
PH UR STRIP: 5.5 PH (ref 4.7–8)
PLATELET # BLD AUTO: 246 10E9/L (ref 150–450)
POTASSIUM SERPL-SCNC: 4.2 MMOL/L (ref 3.4–5.3)
RBC # BLD AUTO: 3.77 10E12/L (ref 4.4–5.9)
SODIUM SERPL-SCNC: 136 MMOL/L (ref 133–144)
SOURCE: ABNORMAL
SP GR UR STRIP: 1.02 (ref 1–1.03)
TSH SERPL DL<=0.005 MIU/L-ACNC: 19.13 MU/L (ref 0.4–4)
UROBILINOGEN UR STRIP-MCNC: NORMAL MG/DL (ref 0–2)
WBC # BLD AUTO: 7.5 10E9/L (ref 4–11)

## 2021-02-15 PROCEDURE — 85025 COMPLETE CBC W/AUTO DIFF WBC: CPT | Performed by: FAMILY MEDICINE

## 2021-02-15 PROCEDURE — 99214 OFFICE O/P EST MOD 30 MIN: CPT | Performed by: FAMILY MEDICINE

## 2021-02-15 PROCEDURE — 81003 URINALYSIS AUTO W/O SCOPE: CPT | Performed by: FAMILY MEDICINE

## 2021-02-15 PROCEDURE — 80048 BASIC METABOLIC PNL TOTAL CA: CPT | Performed by: FAMILY MEDICINE

## 2021-02-15 PROCEDURE — 36415 COLL VENOUS BLD VENIPUNCTURE: CPT | Performed by: FAMILY MEDICINE

## 2021-02-15 PROCEDURE — 93000 ELECTROCARDIOGRAM COMPLETE: CPT | Performed by: INTERNAL MEDICINE

## 2021-02-15 PROCEDURE — 85652 RBC SED RATE AUTOMATED: CPT | Performed by: FAMILY MEDICINE

## 2021-02-15 PROCEDURE — 84443 ASSAY THYROID STIM HORMONE: CPT | Performed by: FAMILY MEDICINE

## 2021-02-15 RX ORDER — LEVOTHYROXINE SODIUM 125 UG/1
125 TABLET ORAL DAILY
COMMUNITY
End: 2021-02-15

## 2021-02-15 RX ORDER — LEVOTHYROXINE SODIUM 137 UG/1
137 TABLET ORAL DAILY
Qty: 60 TABLET | Refills: 0 | Status: SHIPPED | OUTPATIENT
Start: 2021-02-15 | End: 2021-01-01

## 2021-02-15 ASSESSMENT — MIFFLIN-ST. JEOR: SCORE: 1729.99

## 2021-02-15 ASSESSMENT — PAIN SCALES - GENERAL: PAINLEVEL: SEVERE PAIN (6)

## 2021-02-15 NOTE — NURSING NOTE
"Chief Complaint   Patient presents with     Pre-Op Exam       Initial /88   Pulse 74   Temp 97.5  F (36.4  C)   Ht 1.74 m (5' 8.5\")   Wt 95.3 kg (210 lb)   SpO2 97%   BMI 31.47 kg/m   Estimated body mass index is 31.47 kg/m  as calculated from the following:    Height as of this encounter: 1.74 m (5' 8.5\").    Weight as of this encounter: 95.3 kg (210 lb).  Medication Reconciliation: complete  Calvin Torre LPN  "

## 2021-02-15 NOTE — PATIENT INSTRUCTIONS

## 2021-02-15 NOTE — PROGRESS NOTES
[facility] - [place]  [address] [address]  Phone: [phone]  Primary Provider: Addi Downs  Pre-op Performing Provider: ADDI DOWNS      PREOPERATIVE EVALUATION:  Today's date: 2/15/2021    Anthony Steele is a 65 year old male who presents for a preoperative evaluation.    Surgical Information:  Surgery/Procedure: right hip replacement  Surgery Location: [facility]  Surgeon: Dr. Tellez  Surgery Date: 2/24/21  Time of Surgery: unknown  Where patient plans to recover: At home with family  Fax number for surgical facility:     Type of Anesthesia Anticipated: to be determined    Assessment & Plan     The proposed surgical procedure is considered INTERMEDIATE risk.      ICD-10-CM    1. Preop general physical exam  Z01.818 CBC with platelets differential     Basic metabolic panel     TSH     EKG 12-lead complete w/read - Clinics     Erythrocyte sedimentation rate auto     UA without Microscopic     UA without Microscopic     Erythrocyte sedimentation rate auto     EKG 12-lead complete w/read - Clinics     TSH     Basic metabolic panel     CBC with platelets differential   2. Primary osteoarthritis of right hip  M16.11 CBC with platelets differential     Basic metabolic panel     TSH     EKG 12-lead complete w/read - Clinics     Erythrocyte sedimentation rate auto     UA without Microscopic     UA without Microscopic     Erythrocyte sedimentation rate auto     EKG 12-lead complete w/read - Clinics     TSH     Basic metabolic panel     CBC with platelets differential   3. Coronary artery disease involving native coronary artery of native heart without angina pectoris  I25.10 CBC with platelets differential     Basic metabolic panel     TSH     EKG 12-lead complete w/read - Clinics     Erythrocyte sedimentation rate auto     UA without Microscopic     UA without Microscopic     Erythrocyte sedimentation rate auto     EKG 12-lead complete w/read - Clinics     TSH     Basic metabolic panel      CBC with platelets differential   4. Essential hypertension  I10 CBC with platelets differential     Basic metabolic panel     TSH     EKG 12-lead complete w/read - Clinics     Erythrocyte sedimentation rate auto     UA without Microscopic     UA without Microscopic     Erythrocyte sedimentation rate auto     EKG 12-lead complete w/read - Clinics     TSH     Basic metabolic panel     CBC with platelets differential   5. Acquired hypothyroidism  E03.9 CBC with platelets differential     Basic metabolic panel     TSH     EKG 12-lead complete w/read - Clinics     Erythrocyte sedimentation rate auto     UA without Microscopic     UA without Microscopic     Erythrocyte sedimentation rate auto     EKG 12-lead complete w/read - Clinics     TSH     Basic metabolic panel     CBC with platelets differential     levothyroxine (SYNTHROID/LEVOTHROID) 137 MCG tablet   6. Prediabetes  R73.03 CBC with platelets differential     Basic metabolic panel     TSH     EKG 12-lead complete w/read - Clinics     Erythrocyte sedimentation rate auto     UA without Microscopic     UA without Microscopic     Erythrocyte sedimentation rate auto     EKG 12-lead complete w/read - Clinics     TSH     Basic metabolic panel     CBC with platelets differential   7. Primary adenocarcinoma of upper lobe of left lung (H)  C34.12 CBC with platelets differential     Basic metabolic panel     TSH     EKG 12-lead complete w/read - Clinics     Erythrocyte sedimentation rate auto     UA without Microscopic     UA without Microscopic     Erythrocyte sedimentation rate auto     EKG 12-lead complete w/read - Clinics     TSH     Basic metabolic panel     CBC with platelets differential   8. Tobacco abuse, in remission  F17.201 CBC with platelets differential     Basic metabolic panel     TSH     EKG 12-lead complete w/read - Clinics     Erythrocyte sedimentation rate auto     UA without Microscopic     UA without Microscopic     Erythrocyte sedimentation rate  auto     EKG 12-lead complete w/read - Clinics     TSH     Basic metabolic panel     CBC with platelets differential              Risks and Recommendations:  The patient has the following additional risks and recommendations for perioperative complications:   - Consult Hospitalist / IM to assist with post-op medical management    Medication Instructions:  Patient is to take all scheduled medications on the day of surgery EXCEPT for modifications listed below:   Vitamin and otc meds    RECOMMENDATION:  APPROVAL GIVEN to proceed with proposed procedure, without further diagnostic evaluation.                      Subjective     HPI related to upcoming procedure:   66 yO male  presents for cardiopulmonary/general clearance to undergo the above procedure.  His surgeon listed above has asked for this clearance to undergo anesthesia. Pt has had this condition for approximately over 6 months Right hip worse than left after chemo for lung cancer .   Overall pt is of good health and has not  had any perioperative complications with previous surgeries.          Preop Questions 2/15/2021   1. Have you ever had a heart attack or stroke? YES - MI - no angina sx    2. Have you ever had surgery on your heart or blood vessels, such as a stent placement, a coronary artery bypass, or surgery on an artery in your head, neck, heart, or legs? YES - heart stents    3. Do you have chest pain with activity? No   4. Do you have a history of  heart failure? No   5. Do you currently have a cold, bronchitis or symptoms of other infection? No   6. Do you have a cough, shortness of breath, or wheezing? No   7. Do you or anyone in your family have previous history of blood clots? No   8. Do you or does anyone in your family have a serious bleeding problem such as prolonged bleeding following surgeries or cuts? No   9. Have you ever had problems with anemia or been told to take iron pills? No   10. Have you had any abnormal blood loss such as  black, tarry or bloody stools? No   11. Have you ever had a blood transfusion? No   12. Are you willing to have a blood transfusion if it is medically needed before, during, or after your surgery? Yes   13. Have you or any of your relatives ever had problems with anesthesia? YES - little harder time waking up from GA   14. Do you have sleep apnea, excessive snoring or daytime drowsiness? No   15. Do you have any artifical heart valves or other implanted medical devices like a pacemaker, defibrillator, or continuous glucose monitor? No   16. Do you have artificial joints? No   17. Are you allergic to latex? No     Health Care Directive:  Patient does not have a Health Care Directive or Living Will: Discussed advance care planning with patient; however, patient declined at this time.    Preoperative Review of :        Status of Chronic Conditions:  CAD - Patient has a longstanding history of moderate-severe CAD. Patient denies recent chest pain or NTG use, denies exercise induced dyspnea or PND. Last Stress test stress Echo 8/2020 in last year at [facility] - was unremarkable , EKG today .     HYPOTHYROIDISM - Patient has a longstanding history of chronic Hypothyroidism. Patient has been doing well, noting no tremor, insomnia, hair loss or changes in skin texture. Continues to take medications as directed, without adverse reactions or side effects. Last TSH   Lab Results   Component Value Date    TSH 19.13 (H) 02/15/2021   .      RENAL INSUFFICIENCY - Patient has a longstanding history of moderate-severe chronic renal insufficiency.  Cr avg 1.3-1.5 baseline.       Review of Systems  Constitutional, neuro, ENT, endocrine, pulmonary, cardiac, gastrointestinal, genitourinary, musculoskeletal, integument and psychiatric systems are negative, except as otherwise noted.    Patient Active Problem List    Diagnosis Date Noted     Primary adenocarcinoma of upper lobe of left lung (H) 02/26/2020     Priority: Medium      Malignant neoplasm of overlapping sites of right lung (H) 02/26/2020     Priority: Medium     Acquired hypothyroidism 02/26/2020     Priority: Medium     Antineoplastic chemotherapy induced pancytopenia (H) 02/26/2020     Priority: Medium     Family history of premature coronary artery disease 08/28/2018     Priority: Medium     Hyperglycemia 08/28/2018     Priority: Medium     S/P cervical spinal fusion 08/28/2018     Priority: Medium     Overview:   C6-C7       STEMI (ST elevation myocardial infarction) (H) 08/28/2018     Priority: Medium     Primary osteoarthritis involving multiple joints 05/01/2018     Priority: Medium     Non-recurrent bilateral inguinal hernia without obstruction or gangrene 11/01/2017     Priority: Medium     Gastroesophageal reflux disease without esophagitis 03/06/2017     Priority: Medium     Nocturia 03/06/2017     Priority: Medium     Adjustment disorder with depressed mood 03/06/2017     Priority: Medium     Tobacco abuse, in remission      Priority: Medium     Coronary artery disease involving native coronary artery without angina pectoris 07/24/2015     Priority: Medium     Hypertension 07/24/2015     Priority: Medium     Impingement syndrome of right shoulder 03/17/2015     Priority: Medium     Rotator cuff syndrome of right shoulder 03/17/2015     Priority: Medium     Colon polyps      Priority: Medium     Prediabetes      Priority: Medium     H/O ETOH abuse      Priority: Medium     Displacement of cervical intervertebral disc without myelopathy(aka HERNIATED DISK)      Priority: Medium     Advanced care planning/counseling discussion 03/16/2012     Priority: Medium     Coronary atherosclerosis 01/06/2012     Priority: Medium     Problem list name updated by automated process. Provider to review       Personal history of tobacco use, presenting hazards to health 09/06/2011     Priority: Medium     Tobacco abuse, in remission       Hyperlipidemia 08/03/2011     Priority: Medium       Past Medical History:   Diagnosis Date     Acute myocardial infarction, unspecified site, episode of care unspecified 08/03/2011     Adjustment disorder with mixed anxiety and depressed mood 05/23/2012     CAD (coronary artery disease) 3/19/2013     Colon polyps      Coronary atherosclerosis of unspecified type of vessel, native or graft 01/06/2012     Displacement of cervical intervertebral disc without myelopathy      GERD (gastroesophageal reflux disease)      H/O ETOH abuse      HTN (hypertension)      Malignant neoplasm of overlapping sites of right lung (H) 02/26/2020    different from cancer in left lung -- Stage 4// Chemo with Dr Ayala at [facility]      Mixed hyperlipidemia 08/03/2011     Personal history of tobacco use, presenting hazards to health 09/06/2011    Tobacco abuse, in remission     Postsurgical percutaneous transluminal coronary angioplasty status 08/03/2011     Prediabetes      Primary adenocarcinoma of upper lobe of left lung (H) 02/26/2020    s/p Cyberknife     Status post coronary angiogram 3/19/2013     Tobacco abuse, in remission      Past Surgical History:   Procedure Laterality Date     ANGIOPLASTY  [id number]    Failed angioplasty of OM vessel     ARTHROSCOPY KNEE      RT     AS ESOPHAGOSCOPY, DIAGNOSTIC  01/25/2019     BACK SURGERY      C5-6 fusion     Carpal Tunnel Syndrome  2003    Resolved from Problem List  2012     COLONOSCOPY  2008    colonoscopy with polypectomy/ Repeat in 2011     COLONOSCOPY  3/24/2014    Repeat in 2019//Procedure: COLONOSCOPY;  COLONOSCOPY;  Surgeon: Jessica Washington MD;  Location: [facility] OR     COLONOSCOPY  01/25/2019    Repeat in 2024     LAPAROSCOPIC HERNIORRHAPHY INGUINAL BILATERAL Bilateral 7/18/2018    Procedure: LAPAROSCOPIC HERNIORRHAPHY INGUINAL BILATERAL;  LAPAROSCOPIC  INGUINAL HERNIA REPAIR, BILATERAL;  Surgeon: Louis Tomlin DO;  Location: [facility] OR     RELEASE CARPAL TUNNEL      RT     STENT  07/2011    Myocardial Infarction; Stent placement [facility]     STENT, CORONARY, LIZZIE  2012     Current Outpatient Medications   Medication Sig Dispense Refill     aspirin 325 MG tablet Take 1 tablet (325 mg) by mouth daily 180 tablet      atorvastatin (LIPITOR) 40 MG tablet TAKE 1 TABLET BY MOUTH DAILY - GENERIC FOR LIPITOR 90 tablet 3     buPROPion (WELLBUTRIN XL) 300 MG 24 hr tablet TAKE 1 TABLET BY MOUTH EVERY DAY *NEEDS TO BE SEEN FOR FUTURE FILLS* 90 tablet 3     dexamethasone (DECADRON) 4 MG tablet Take 1 tablet by mouth two times daily for three days beginning the day prior to chemotherapy.       EPINEPHrine (EPIPEN/ADRENACLICK/OR ANY BX GENERIC EQUIV) 0.3 MG/0.3ML injection 2-pack Inject 0.3 mLs (0.3 mg) into the muscle as needed for anaphylaxis 0.6 mL 3     folic acid (FOLVITE) 1 MG tablet Take 1 tablet (800 mcg) once daily starting 5-7 days before first pemetrexed treatment and continuing 21 days after last dose.       levothyroxine (SYNTHROID/LEVOTHROID) 100 MCG tablet Take 100 mcg by mouth       lisinopril (ZESTRIL) 10 MG tablet TAKE 1 TABLET BY MOUTH DAILY- GENERIC FOR ZESTRIL 90 tablet 3     metoprolol tartrate (LOPRESSOR) 25 MG tablet TAKE 1 TABLET BY MOUTH TWICE A  tablet 3     nitroglycerin (NITROSTAT) 0.4 MG sublingual tablet PLACE 1 TABLET UNDER THE TONGUE EVERY FIVE MINUTES AS NEEDED FOR CHEST PAIN. DO NOT CRUSH; MAXIMUM OF 3 DOSES IN 15 MINUTES. 25 tablet 5     Omega-3 Fatty Acids (OMEGA-3 FISH OIL PO) Take 1 g by mouth       omeprazole (PRILOSEC) 40 MG DR capsule TAKE 1 CAPSULE BY MOUTH ONCE DAILY. TAKE 30 TO 60 MINUTES BEFORE A MEAL. 90 capsule 3     prochlorperazine (COMPAZINE) 10 MG tablet Take 10 mg by mouth         Allergies   Allergen Reactions     Codeine GI Disturbance and Nausea     Upsets stomach       No Clinical Screening - See Comments      Shellfish-Derived Products Other (See Comments) and Hives     Felt tingly and had hives.  Hives/ nasal congestion and swelling         Social History     Tobacco Use     Smoking status:  "Former Smoker     Packs/day: 1.00     Years: 20.00     Pack years: 20.00     Smokeless tobacco: Never Used     Tobacco comment: Tried to quit: yes; longest period tobacco-free- 10 years   Substance Use Topics     Alcohol use: Yes     Comment: Occasionally     Family History   Problem Relation Age of Onset     Cerebrovascular Disease Father 48        Cause of death     Diabetes Mother         Type 2     Heart Disease Mother         Heart valve replacements     Hypertension Mother      Arthritis Sister      History   Drug Use Not on file         Objective     /88   Pulse 74   Temp 97.5  F (36.4  C)   Ht 1.74 m (5' 8.5\")   Wt 95.3 kg (210 lb)   SpO2 97%   BMI 31.47 kg/m      Physical Exam    GENERAL APPEARANCE: healthy, alert and no distress     EYES: EOMI,  PERRL     HENT: ear canals and TM's normal and nose and mouth without ulcers or lesions     NECK: no adenopathy, no asymmetry, masses, or scars and thyroid normal to palpation     RESP: lungs clear to auscultation - no rales, rhonchi or wheezes     CV: regular rates and rhythm, normal S1 S2, no S3 or S4 and no murmur, click or rub     ABDOMEN:  soft, nontender, no HSM or masses and bowel sounds normal     MS: extremities normal- no gross deformities noted, no evidence of inflammation in joints, FROM in all extremities.     SKIN: no suspicious lesions or rashes     NEURO: Normal strength and tone, sensory exam grossly normal, mentation intact and speech normal     PSYCH: mentation appears normal. and affect normal/bright     LYMPHATICS: No cervical adenopathy    Recent Labs   Lab Test 02/26/20  1013   HGB 10.3*   *      POTASSIUM 4.1   CR 1.10   A1C 6.3*        Diagnostics:  Recent Results (from the past 24 hour(s))   UA without Microscopic    Collection Time: 02/15/21  2:25 PM   Result Value Ref Range    Color Urine Yellow     Appearance Urine Clear     Glucose Urine Negative NEG^Negative mg/dL    Bilirubin Urine Negative NEG^Negative    " Ketones Urine Negative NEG^Negative mg/dL    Specific Gravity Urine 1.024 1.003 - 1.035    Blood Urine Negative NEG^Negative    pH Urine 5.5 4.7 - 8.0 pH    Protein Albumin Urine 10 (A) NEG^Negative mg/dL    Urobilinogen mg/dL Normal 0.0 - 2.0 mg/dL    Nitrite Urine Negative NEG^Negative    Leukocyte Esterase Urine Negative NEG^Negative    Source Midstream Urine    Erythrocyte sedimentation rate auto    Collection Time: 02/15/21  2:25 PM   Result Value Ref Range    Sed Rate 35 (H) 0 - 20 mm/h   TSH    Collection Time: 02/15/21  2:25 PM   Result Value Ref Range    TSH 19.13 (H) 0.40 - 4.00 mU/L   Basic metabolic panel    Collection Time: 02/15/21  2:25 PM   Result Value Ref Range    Sodium 136 133 - 144 mmol/L    Potassium 4.2 3.4 - 5.3 mmol/L    Chloride 103 94 - 109 mmol/L    Carbon Dioxide 28 20 - 32 mmol/L    Anion Gap 5 3 - 14 mmol/L    Glucose 104 (H) 70 - 99 mg/dL    Urea Nitrogen 20 7 - 30 mg/dL    Creatinine 1.39 (H) 0.66 - 1.25 mg/dL    GFR Estimate 53 (L) >60 mL/min/[1.73_m2]    GFR Estimate If Black 62 >60 mL/min/[1.73_m2]    Calcium 9.4 8.5 - 10.1 mg/dL   CBC with platelets differential    Collection Time: 02/15/21  2:25 PM   Result Value Ref Range    WBC 7.5 4.0 - 11.0 10e9/L    RBC Count 3.77 (L) 4.4 - 5.9 10e12/L    Hemoglobin 13.0 (L) 13.3 - 17.7 g/dL    Hematocrit 38.5 (L) 40.0 - 53.0 %     (H) 78 - 100 fl    MCH 34.5 (H) 26.5 - 33.0 pg    MCHC 33.8 31.5 - 36.5 g/dL    RDW 12.0 10.0 - 15.0 %    Platelet Count 246 150 - 450 10e9/L    Diff Method Automated Method     % Neutrophils 69.0 %    % Lymphocytes 17.3 %    % Monocytes 10.8 %    % Eosinophils 1.9 %    % Basophils 0.5 %    % Immature Granulocytes 0.5 %    Nucleated RBCs 0 0 /100    Absolute Neutrophil 5.2 1.6 - 8.3 10e9/L    Absolute Lymphocytes 1.3 0.8 - 5.3 10e9/L    Absolute Monocytes 0.8 0.0 - 1.3 10e9/L    Absolute Eosinophils 0.1 0.0 - 0.7 10e9/L    Absolute Basophils 0.0 0.0 - 0.2 10e9/L    Abs Immature Granulocytes 0.0 0 - 0.4  10e9/L    Absolute Nucleated RBC 0.0       EKG: appears normal, NSR, Normal Sinus Rhythm, normal axis, normal intervals, no acute ST/T changes c/w ischemia, no LVH by voltage criteria, unchanged from previous tracings    Revised Cardiac Risk Index (RCRI):  The patient has the following serious cardiovascular risks for perioperative complications:   - Coronary Artery Disease (MI, positive stress test, angina, Qs on EKG) = 1 point     RCRI Interpretation: 1 point: Class II (low risk - 0.9% complication rate)             Signed Electronically by: Abdulkadir Huizar MD  Copy of this evaluation report is provided to requesting physician.    [facility] Preop Guidelines    Revised Cardiac Risk Index

## 2021-02-17 ENCOUNTER — OFFICE VISIT (OUTPATIENT)
Dept: FAMILY MEDICINE | Facility: OTHER | Age: 66
End: 2021-02-17
Attending: ORTHOPAEDIC SURGERY
Payer: COMMERCIAL

## 2021-02-17 DIAGNOSIS — Z20.822 COVID-19 RULED OUT: Primary | ICD-10-CM

## 2021-02-17 LAB
SARS-COV-2 RNA RESP QL NAA+PROBE: NORMAL
SPECIMEN SOURCE: NORMAL

## 2021-02-17 PROCEDURE — U0003 INFECTIOUS AGENT DETECTION BY NUCLEIC ACID (DNA OR RNA); SEVERE ACUTE RESPIRATORY SYNDROME CORONAVIRUS 2 (SARS-COV-2) (CORONAVIRUS DISEASE [COVID-19]), AMPLIFIED PROBE TECHNIQUE, MAKING USE OF HIGH THROUGHPUT TECHNOLOGIES AS DESCRIBED BY CMS-2020-01-R: HCPCS | Performed by: ORTHOPAEDIC SURGERY

## 2021-02-17 PROCEDURE — U0005 INFEC AGEN DETEC AMPLI PROBE: HCPCS | Performed by: ORTHOPAEDIC SURGERY

## 2021-02-18 LAB
LABORATORY COMMENT REPORT: NORMAL
SARS-COV-2 RNA RESP QL NAA+PROBE: NEGATIVE
SPECIMEN SOURCE: NORMAL

## 2021-03-12 NOTE — TELEPHONE ENCOUNTER
Call pt - gave #60 Synthroid - new dose on 2/15- should not need another Rx yet and needs to have TSH labs done before 4/15

## 2021-03-12 NOTE — TELEPHONE ENCOUNTER
levothyroxine      Last Written Prescription Date:  2/15/21  Last Fill Quantity: 60,   # refills: 0  Last Office Visit: 2/15/21  Future Office visit:    Next 5 appointments (look out 90 days)    Mar 24, 2021 11:15 AM  (Arrive by 11:00 AM)  SHORT with Abdulkadir Huizar MD  [facility] [place] ([facility] [place] ) [address]  [place] [address]  [phone]           Routing refill request to provider for review/approval because:

## 2021-03-15 NOTE — TELEPHONE ENCOUNTER
Patient called requesting lisinopril 5 mg be sent in the 10 mg are to small to cut in half and he is also requesting the levothyroxine 137 mcg. He reports that [facility] has the wrong dose on fle.

## 2021-03-19 NOTE — PROGRESS NOTES
"    {PROVIDER CHARTING PREFERENCE:301926}    Ivonne Cruz is a 65 year old who presents for the following health issues {ACCOMPANIED BY STATEMENT (Optional):237741}    HPI     Hypothyroidism Follow-up      Since last visit, patient describes the following symptoms: { :402807::\"Weight stable, no hair loss, no skin changes, no constipation, no loose stools\"}        {additonal problems for provider to add (Optional):542955}    Review of Systems   {ROS COMP (Optional):901278}      Objective    There were no vitals taken for this visit.  There is no height or weight on file to calculate BMI.  Physical Exam   {Exam List (Optional):632001}    {Diagnostic Test Results (Optional):029300}    {AMBULATORY ATTESTATION (Optional):239822}        "

## 2021-03-24 NOTE — NURSING NOTE
"Chief Complaint   Patient presents with     Surgical Followup       Initial /58   Pulse 72   Temp 97.1  F (36.2  C)   Wt 95.3 kg (210 lb)   SpO2 98%   BMI 31.47 kg/m   Estimated body mass index is 31.47 kg/m  as calculated from the following:    Height as of 2/15/21: 1.74 m (5' 8.5\").    Weight as of this encounter: 95.3 kg (210 lb).  Medication Reconciliation: complete  Calvin Torre LPN  "

## 2021-03-24 NOTE — PROGRESS NOTES
"    Assessment & Plan     Acquired hypothyroidism  Will increase from 137 to 150 mcg and lab appt in 8-10 weeks.    - levothyroxine (SYNTHROID/LEVOTHROID) 150 MCG tablet; Take 1 tablet (150 mcg) by mouth daily    Status post right hip replacement  Stable - doing well. Handicap sticker given and pt planning for other hip to be replaced in near future. CBC improving.  - CBC with platelets differential; Future             BMI:   Estimated body mass index is 31.47 kg/m  as calculated from the following:    Height as of 2/15/21: 1.74 m (5' 8.5\").    Weight as of this encounter: 95.3 kg (210 lb).           No follow-ups on file.    Abdulkadir Huizar MD  [facility] - REJI Cruz is a 65 year old who presents for the following health issues     HPI     Concern - surgical follow up  Onset: hip replacement  Description: right hip  Intensity: moderate  Progression of Symptoms:  improving  Accompanying Signs & Symptoms: n/a  Previous history of similar problem: hip replacement  Precipitating factors:        Worsened by: nothing  Alleviating factors:        Improved by: surgery an dPT  Therapies tried and outcome:  none     Hypothyroidism Follow-up      Since last visit, patient describes the following symptoms: Weight stable, no hair loss, no skin changes, no constipation, no loose stools          Review of Systems   Constitutional, HEENT, cardiovascular, pulmonary, gi and gu systems are negative, except as otherwise noted.      Objective    /58   Pulse 72   Temp 97.1  F (36.2  C)   Wt 95.3 kg (210 lb)   SpO2 98%   BMI 31.47 kg/m    Body mass index is 31.47 kg/m .  Physical Exam   GENERAL: healthy, alert and no distress  NECK: no adenopathy, no asymmetry, masses, or scars and thyroid normal to palpation  RESP: lungs clear to auscultation - no rales, rhonchi or wheezes  CV: regular rate and rhythm, normal S1 S2, no S3 or S4, no murmur, click or rub, no peripheral edema and peripheral " pulses strong  MS: no gross musculoskeletal defects noted, no edema, incision healing well on right hip region     Results for orders placed or performed in visit on 03/24/21   CBC with platelets differential     Status: Abnormal   Result Value Ref Range    WBC 5.8 4.0 - 11.0 10e9/L    RBC Count 3.45 (L) 4.4 - 5.9 10e12/L    Hemoglobin 11.2 (L) 13.3 - 17.7 g/dL    Hematocrit 34.5 (L) 40.0 - 53.0 %     78 - 100 fl    MCH 32.5 26.5 - 33.0 pg    MCHC 32.5 31.5 - 36.5 g/dL    RDW 13.0 10.0 - 15.0 %    Platelet Count 241 150 - 450 10e9/L    Diff Method Automated Method     % Neutrophils 58.6 %    % Lymphocytes 24.1 %    % Monocytes 13.0 %    % Eosinophils 3.1 %    % Basophils 0.7 %    % Immature Granulocytes 0.5 %    Nucleated RBCs 0 0 /100    Absolute Neutrophil 3.4 1.6 - 8.3 10e9/L    Absolute Lymphocytes 1.4 0.8 - 5.3 10e9/L    Absolute Monocytes 0.8 0.0 - 1.3 10e9/L    Absolute Eosinophils 0.2 0.0 - 0.7 10e9/L    Absolute Basophils 0.0 0.0 - 0.2 10e9/L    Abs Immature Granulocytes 0.0 0 - 0.4 10e9/L    Absolute Nucleated RBC 0.0    TSH     Status: None   Result Value Ref Range    TSH Duplicate request 0.40 - 4.00 mU/L     TSH noted from [facility] -

## 2021-04-13 NOTE — TELEPHONE ENCOUNTER
Wellbutrin       Last Written Prescription Date:  4/16/2020  Last Fill Quantity: 90,   # refills: 3    Prilosec       Last Written Prescription Date:  4/16/2020  Last Fill Quantity: 90,   # refills: 3  Last Office Visit: 3/24/2021  Future Office visit:

## 2021-05-10 NOTE — PATIENT INSTRUCTIONS

## 2021-05-10 NOTE — PROGRESS NOTES
[facility] [place]  [address]  [facility] [place] [address]  Phone: [phone]  Primary Provider: Addi Downs  Pre-op Performing Provider: ADDI DOWNS      PREOPERATIVE EVALUATION:  Today's date: 5/11/2021    Anthony Steele is a 65 year old male who presents for a preoperative evaluation.    Surgical Information:  Surgery/Procedure: left hip total arthropasty  Surgery Location: [facility]  Surgeon: DR. Tellez  Surgery Date: 6/2/21  Time of Surgery: unknown  Where patient plans to recover: At home with family  Fax number for surgical facility: [phone]    Type of Anesthesia Anticipated: to be determined    Assessment & Plan     The proposed surgical procedure is considered INTERMEDIATE risk.    Preop general physical exam  Pt is cleared for surgery - see below.   - CBC with platelets differential  - EKG 12-lead complete w/read - Clinics  - HYDROcodone-acetaminophen (NORCO) 5-325 MG tablet; Take 1-2 tablets by mouth every 8 hours as needed for moderate to severe pain    Primary osteoarthritis of left hip  Increase pain - asking for something stronger til surgery. Has used Lortab in past. Will give few lortab. Using tylenol and off all NSAIDS  - CBC with platelets differential  - EKG 12-lead complete w/read - Clinics  - HYDROcodone-acetaminophen (NORCO) 5-325 MG tablet; Take 1-2 tablets by mouth every 8 hours as needed for moderate to severe pain    Acquired hypothyroidism  Much better TSH - RF done  - CBC with platelets differential  - EKG 12-lead complete w/read - Clinics  - levothyroxine (SYNTHROID/LEVOTHROID) 137 MCG tablet; Take 1 tablet (137 mcg) by mouth daily    Prediabetes  Stable   - CBC with platelets differential  - EKG 12-lead complete w/read - Clinics    Coronary artery disease involving native coronary artery of native heart without angina pectoris  Stable - no angina. Continue current medications and behavioral changes.   - CBC with platelets differential  - EKG 12-lead complete  w/read - Clinics    Stage 3a chronic kidney disease  RFT still elevated. Etiology unclear. Will check Renal US looking for signs of obstruction .  Pt to push liquids and will see back in 2 weeks and recheck labs. Pt agrees  - US Renal Complete; Future  - HYDROcodone-acetaminophen (NORCO) 5-325 MG tablet; Take 1-2 tablets by mouth every 8 hours as needed for moderate to severe pain             Medication Instructions:  Patient is to take all scheduled medications on the day of surgery EXCEPT for modifications listed below:   Hold Lisinopril the morning of surgery    RECOMMENDATION:  APPROVAL GIVEN to proceed with proposed procedure, without further diagnostic evaluation.                      Subjective     HPI related to upcoming procedure:   66 yO male  presents for cardiopulmonary/general clearance to undergo the above procedure.  His surgeon listed above has asked for this clearance to undergo anesthesia. Pt has had this condition for approximately a year or more .   Overall pt is of good health and has not  had any perioperative complications with previous surgeries.      Pt had Right LEEANNE in February and did exceptionally well.     Preop Questions 5/11/2021   1. Have you ever had a heart attack or stroke? YES - heart attack   2. Have you ever had surgery on your heart or blood vessels, such as a stent placement, a coronary artery bypass, or surgery on an artery in your head, neck, heart, or legs? YES - stents   3. Do you have chest pain with activity? No   4. Do you have a history of  heart failure? No   5. Do you currently have a cold, bronchitis or symptoms of other infection? No   6. Do you have a cough, shortness of breath, or wheezing? No   7. Do you or anyone in your family have previous history of blood clots? No   8. Do you or does anyone in your family have a serious bleeding problem such as prolonged bleeding following surgeries or cuts? No   9. Have you ever had problems with anemia or been told to  take iron pills? No   10. Have you had any abnormal blood loss such as black, tarry or bloody stools? No   11. Have you ever had a blood transfusion? No   12. Are you willing to have a blood transfusion if it is medically needed before, during, or after your surgery? Yes   13. Have you or any of your relatives ever had problems with anesthesia? YES - self/ hard time waking up   14. Do you have sleep apnea, excessive snoring or daytime drowsiness? No   15. Do you have any artifical heart valves or other implanted medical devices like a pacemaker, defibrillator, or continuous glucose monitor? No   16. Do you have artificial joints? YES - right hip   17. Are you allergic to latex? No     Health Care Directive:  Patient does not have a Health Care Directive or Living Will:     Preoperative Review of :   reviewed - no record of controlled substances prescribed.      Status of Chronic Conditions:  CAD - Patient has a longstanding history of moderate-severe CAD. Patient denies recent chest pain or NTG use, denies exercise induced dyspnea or PND. , EKG today.     HYPERTENSION - Patient has longstanding history of HTN , currently denies any symptoms referable to elevated blood pressure. Specifically denies chest pain, palpitations, dyspnea, orthopnea, PND or peripheral edema. Blood pressure readings have been in normal range. Current medication regimen is as listed below. Patient denies any side effects of medication.     HYPOTHYROIDISM - Patient has a longstanding history of chronic Hypothyroidism. Patient has been doing well, noting no tremor, insomnia, hair loss or changes in skin texture. Continues to take medications as directed, without adverse reactions or side effects. Last TSH   Lab Results   Component Value Date    TSH 2.12 05/11/2021   .      RENAL INSUFFICIENCY - Patient has a recent history of moderate chronic renal insufficiency. Last Cr 1.46.       Review of Systems  Constitutional, neuro, ENT,  endocrine, pulmonary, cardiac, gastrointestinal, genitourinary, musculoskeletal, integument and psychiatric systems are negative, except as otherwise noted.    Patient Active Problem List    Diagnosis Date Noted     Primary adenocarcinoma of upper lobe of left lung (H) 02/26/2020     Priority: Medium     Malignant neoplasm of overlapping sites of right lung (H) 02/26/2020     Priority: Medium     Acquired hypothyroidism 02/26/2020     Priority: Medium     Antineoplastic chemotherapy induced pancytopenia (H) 02/26/2020     Priority: Medium     Family history of premature coronary artery disease 08/28/2018     Priority: Medium     Hyperglycemia 08/28/2018     Priority: Medium     S/P cervical spinal fusion 08/28/2018     Priority: Medium     Overview:   C6-C7       STEMI (ST elevation myocardial infarction) (H) 08/28/2018     Priority: Medium     Primary osteoarthritis involving multiple joints 05/01/2018     Priority: Medium     Non-recurrent bilateral inguinal hernia without obstruction or gangrene 11/01/2017     Priority: Medium     Gastroesophageal reflux disease without esophagitis 03/06/2017     Priority: Medium     Nocturia 03/06/2017     Priority: Medium     Adjustment disorder with depressed mood 03/06/2017     Priority: Medium     Tobacco abuse, in remission      Priority: Medium     Coronary artery disease involving native coronary artery without angina pectoris 07/24/2015     Priority: Medium     Hypertension 07/24/2015     Priority: Medium     Impingement syndrome of right shoulder 03/17/2015     Priority: Medium     Rotator cuff syndrome of right shoulder 03/17/2015     Priority: Medium     Colon polyps      Priority: Medium     Prediabetes      Priority: Medium     H/O ETOH abuse      Priority: Medium     Displacement of cervical intervertebral disc without myelopathy(aka HERNIATED DISK)      Priority: Medium     Advanced care planning/counseling discussion 03/16/2012     Priority: Medium     Coronary  atherosclerosis 01/06/2012     Priority: Medium     Problem list name updated by automated process. Provider to review       Personal history of tobacco use, presenting hazards to health 09/06/2011     Priority: Medium     Tobacco abuse, in remission       Hyperlipidemia 08/03/2011     Priority: Medium      Past Medical History:   Diagnosis Date     Acute myocardial infarction, unspecified site, episode of care unspecified 08/03/2011     Adjustment disorder with mixed anxiety and depressed mood 05/23/2012     CAD (coronary artery disease) 3/19/2013     Colon polyps      Coronary atherosclerosis of unspecified type of vessel, native or graft 01/06/2012     Displacement of cervical intervertebral disc without myelopathy      GERD (gastroesophageal reflux disease)      H/O ETOH abuse      HTN (hypertension)      Malignant neoplasm of overlapping sites of right lung (H) 02/26/2020    different from cancer in left lung -- Stage 4// Chemo with Dr Ayala at [facility]      Mixed hyperlipidemia 08/03/2011     Personal history of tobacco use, presenting hazards to health 09/06/2011    Tobacco abuse, in remission     Postsurgical percutaneous transluminal coronary angioplasty status 08/03/2011     Prediabetes      Primary adenocarcinoma of upper lobe of left lung (H) 02/26/2020    s/p Cyberknife     Status post coronary angiogram 3/19/2013     Tobacco abuse, in remission      Past Surgical History:   Procedure Laterality Date     ANGIOPLASTY  03/05/2013    Failed angioplasty of OM vessel     ARTHROSCOPY KNEE      RT     AS ESOPHAGOSCOPY, DIAGNOSTIC  01/25/2019     AS TOTAL HIP ARTHROPLASTY Right 02/24/2020     BACK SURGERY      C5-6 fusion     Carpal Tunnel Syndrome  2003    Resolved from Problem List  2012     COLONOSCOPY  2008    colonoscopy with polypectomy/ Repeat in 2011     COLONOSCOPY  03/24/2014    Repeat in 2019//Procedure: COLONOSCOPY;  COLONOSCOPY;  Surgeon: Jessica Washington MD;  Location: [facility] OR     COLONOSCOPY   01/25/2019    Repeat in 2024     LAPAROSCOPIC HERNIORRHAPHY INGUINAL BILATERAL Bilateral 07/18/2018    Procedure: LAPAROSCOPIC HERNIORRHAPHY INGUINAL BILATERAL;  LAPAROSCOPIC  INGUINAL HERNIA REPAIR, BILATERAL;  Surgeon: Louis Tomlin DO;  Location: [facility] OR     RELEASE CARPAL TUNNEL      RT     STENT  07/2011    Myocardial Infarction; Stent placement [facility]     STENT, CORONARY, LIZZIE  2012     Current Outpatient Medications   Medication Sig Dispense Refill     aspirin 325 MG tablet Take 1 tablet (325 mg) by mouth daily (Patient taking differently: Take 325 mg by mouth 2 times daily ) 180 tablet      atorvastatin (LIPITOR) 40 MG tablet TAKE 1 TABLET BY MOUTH DAILY - GENERIC FOR LIPITOR 90 tablet 3     buPROPion (WELLBUTRIN XL) 300 MG 24 hr tablet TAKE 1 TABLET BY MOUTH EVERY DAY *NEEDS TO BE SEEN FOR FUTURE FILLS* 90 tablet 3     EPINEPHrine (EPIPEN/ADRENACLICK/OR ANY BX GENERIC EQUIV) 0.3 MG/0.3ML injection 2-pack Inject 0.3 mLs (0.3 mg) into the muscle as needed for anaphylaxis 0.6 mL 3     levothyroxine (SYNTHROID/LEVOTHROID) 137 MCG tablet Take 1 tablet (137 mcg) by mouth daily 60 tablet 0     levothyroxine (SYNTHROID/LEVOTHROID) 150 MCG tablet Take 1 tablet (150 mcg) by mouth daily 90 tablet 0     lisinopril (ZESTRIL) 5 MG tablet Take 1 tablet (5 mg) by mouth daily 90 tablet 3     metoprolol tartrate (LOPRESSOR) 25 MG tablet TAKE 1 TABLET BY MOUTH TWICE A  tablet 3     nitroglycerin (NITROSTAT) 0.4 MG sublingual tablet PLACE 1 TABLET UNDER THE TONGUE EVERY FIVE MINUTES AS NEEDED FOR CHEST PAIN. DO NOT CRUSH; MAXIMUM OF 3 DOSES IN 15 MINUTES. 25 tablet 5     Omega-3 Fatty Acids (OMEGA-3 FISH OIL PO) Take 1 g by mouth       omeprazole (PRILOSEC) 40 MG DR capsule TAKE 1 CAPSULE BY MOUTH ONCE DAILY. TAKE 30 TO 60 MINUTES BEFORE A MEAL. 90 capsule 3     prochlorperazine (COMPAZINE) 10 MG tablet Take 10 mg by mouth         Allergies   Allergen Reactions     Codeine GI Disturbance and Nausea     Upsets  "stomach       No Clinical Screening - See Comments      Shellfish-Derived Products Other (See Comments) and Hives     Felt tingly and had hives.  Hives/ nasal congestion and swelling         Social History     Tobacco Use     Smoking status: Former Smoker     Packs/day: 1.00     Years: 20.00     Pack years: 20.00     Smokeless tobacco: Never Used     Tobacco comment: Tried to quit: yes; longest period tobacco-free- 10 years   Substance Use Topics     Alcohol use: Yes     Comment: Occasionally     Family History   Problem Relation Age of Onset     Cerebrovascular Disease Father 48        Cause of death     Diabetes Mother         Type 2     Heart Disease Mother         Heart valve replacements     Hypertension Mother      Arthritis Sister      History   Drug Use Not on file         Objective     /72   Pulse 62   Temp 98  F (36.7  C)   Ht 1.74 m (5' 8.5\")   Wt 91.6 kg (202 lb)   SpO2 98%   BMI 30.27 kg/m      Physical Exam    GENERAL APPEARANCE: healthy, alert and no distress     EYES: EOMI,  PERRL     HENT: ear canals and TM's normal and nose and mouth without ulcers or lesions     NECK: no adenopathy, no asymmetry, masses, or scars and thyroid normal to palpation     RESP: lungs clear to auscultation - no rales, rhonchi or wheezes     CV: regular rates and rhythm, normal S1 S2, no S3 or S4 and no murmur, click or rub     ABDOMEN:  soft, nontender, no HSM or masses and bowel sounds normal     MS: extremities normal- no gross deformities noted, no evidence of inflammation in joints, FROM in all extremities.     SKIN: no suspicious lesions or rashes     NEURO: Normal strength and tone, sensory exam grossly normal, mentation intact and speech normal     PSYCH: mentation appears normal. and affect normal/bright     LYMPHATICS: No cervical adenopathy    Recent Labs   Lab Test 03/24/21  1058 02/15/21  1425 02/26/20  1013   HGB 11.2* 13.0* 10.3*    246 136*   NA  --  136 139   POTASSIUM  --  4.2 4.1   CR  " --  1.39* 1.10   A1C  --   --  6.3*        Diagnostics:  Recent Results (from the past 24 hour(s))   CBC with platelets differential    Collection Time: 05/11/21 10:55 AM   Result Value Ref Range    WBC 5.4 4.0 - 11.0 10e9/L    RBC Count 3.79 (L) 4.4 - 5.9 10e12/L    Hemoglobin 12.1 (L) 13.3 - 17.7 g/dL    Hematocrit 36.7 (L) 40.0 - 53.0 %    MCV 97 78 - 100 fl    MCH 31.9 26.5 - 33.0 pg    MCHC 33.0 31.5 - 36.5 g/dL    RDW 14.5 10.0 - 15.0 %    Platelet Count 269 150 - 450 10e9/L    Diff Method Automated Method     % Neutrophils 62.5 %    % Lymphocytes 23.1 %    % Monocytes 12.3 %    % Eosinophils 1.3 %    % Basophils 0.6 %    % Immature Granulocytes 0.2 %    Nucleated RBCs 0 0 /100    Absolute Neutrophil 3.4 1.6 - 8.3 10e9/L    Absolute Lymphocytes 1.2 0.8 - 5.3 10e9/L    Absolute Monocytes 0.7 0.0 - 1.3 10e9/L    Absolute Eosinophils 0.1 0.0 - 0.7 10e9/L    Absolute Basophils 0.0 0.0 - 0.2 10e9/L    Abs Immature Granulocytes 0.0 0 - 0.4 10e9/L    Absolute Nucleated RBC 0.0    TSH    Collection Time: 05/11/21 10:55 AM   Result Value Ref Range    TSH 2.12 0.40 - 4.00 mU/L   Basic metabolic panel    Collection Time: 05/11/21 10:55 AM   Result Value Ref Range    Sodium 137 133 - 144 mmol/L    Potassium 4.7 3.4 - 5.3 mmol/L    Chloride 108 94 - 109 mmol/L    Carbon Dioxide 25 20 - 32 mmol/L    Anion Gap 4 3 - 14 mmol/L    Glucose 116 (H) 70 - 99 mg/dL    Urea Nitrogen 25 7 - 30 mg/dL    Creatinine 1.46 (H) 0.66 - 1.25 mg/dL    GFR Estimate 50 (L) >60 mL/min/[1.73_m2]    GFR Estimate If Black 58 (L) >60 mL/min/[1.73_m2]    Calcium 9.7 8.5 - 10.1 mg/dL      EKG: appears normal, NSR, normal axis, normal intervals, no acute ST/T changes c/w ischemia, no LVH by voltage criteria, unchanged from previous tracings    Revised Cardiac Risk Index (RCRI):  The patient has the following serious cardiovascular risks for perioperative complications:   - Coronary Artery Disease (MI, positive stress test, angina, Qs on EKG) = 1 point      RCRI Interpretation: 1 point: Class II (low risk - 0.9% complication rate)           Signed Electronically by: Abdulkadir Huizar MD  Copy of this evaluation report is provided to requesting physician.

## 2021-05-11 PROBLEM — N18.31 STAGE 3A CHRONIC KIDNEY DISEASE (H): Status: ACTIVE | Noted: 2021-01-01

## 2021-05-11 NOTE — NURSING NOTE
"Chief Complaint   Patient presents with     Pre-Op Exam       Initial /72   Pulse 62   Temp 98  F (36.7  C)   Ht 1.74 m (5' 8.5\")   Wt 91.6 kg (202 lb)   SpO2 98%   BMI 30.27 kg/m   Estimated body mass index is 30.27 kg/m  as calculated from the following:    Height as of this encounter: 1.74 m (5' 8.5\").    Weight as of this encounter: 91.6 kg (202 lb).  Medication Reconciliation: complete  Calvin Torre LPN  "

## 2021-05-20 NOTE — PROGRESS NOTES
"    Assessment & Plan     Stage 3a chronic kidney disease  Stable but up from baseline in last 3 months. US negative.  Will watch. Stay off NSAIDS. Follow-up in 3-4 months. Pt still cleared for upcoming ortho surgery  - Basic metabolic panel; Future             BMI:   Estimated body mass index is 30.27 kg/m  as calculated from the following:    Height as of this encounter: 1.74 m (5' 8.5\").    Weight as of this encounter: 91.6 kg (202 lb).           No follow-ups on file.    Abdulkadir Huizar MD  [facility] - [facility] Cruz is a 65 year old who presents for the following health issues     HPI     Chronic Kidney Disease Follow-up      Do you take any over the counter pain medicine?: No    No issues   Not on any NSAIDS  NO new meds  Follow-up on mild elevation of RFT  US was negative   '        Review of Systems   CONSTITUTIONAL: NEGATIVE for fever, chills, change in weight  CV: NEGATIVE for chest pain, palpitations or peripheral edema      Objective    /64   Pulse 64   Temp 97.9  F (36.6  C)   Ht 1.74 m (5' 8.5\")   Wt 91.6 kg (202 lb)   SpO2 96%   BMI 30.27 kg/m    Body mass index is 30.27 kg/m .  Physical Exam   GENERAL: healthy, alert and no distress  RESP: lungs clear to auscultation - no rales, rhonchi or wheezes  CV: regular rate and rhythm, normal S1 S2, no S3 or S4, no murmur, click or rub, no peripheral edema and peripheral pulses strong    Results for orders placed or performed in visit on 05/26/21   Basic metabolic panel     Status: Abnormal   Result Value Ref Range    Sodium 136 133 - 144 mmol/L    Potassium 4.0 3.4 - 5.3 mmol/L    Chloride 106 94 - 109 mmol/L    Carbon Dioxide 24 20 - 32 mmol/L    Anion Gap 6 3 - 14 mmol/L    Glucose 97 70 - 99 mg/dL    Urea Nitrogen 21 7 - 30 mg/dL    Creatinine 1.35 (H) 0.66 - 1.25 mg/dL    GFR Estimate 55 (L) >60 mL/min/[1.73_m2]    GFR Estimate If Black 64 >60 mL/min/[1.73_m2]    Calcium 9.3 8.5 - 10.1 mg/dL                 "

## 2021-05-26 NOTE — NURSING NOTE
"Chief Complaint   Patient presents with     Kidney Problem       Initial /64   Pulse 64   Temp 97.9  F (36.6  C)   Ht 1.74 m (5' 8.5\")   Wt 91.6 kg (202 lb)   SpO2 96%   BMI 30.27 kg/m   Estimated body mass index is 30.27 kg/m  as calculated from the following:    Height as of this encounter: 1.74 m (5' 8.5\").    Weight as of this encounter: 91.6 kg (202 lb).  Medication Reconciliation: complete  Calvin Torre LPN  "

## 2021-07-12 NOTE — TELEPHONE ENCOUNTER
ATORVASTATIN 40 MG TABLET     Last Written Prescription Date:  4/16/20  Last Fill Quantity: 90,   # refills: 3  Last Office Visit: 5/26/21  Future Office visit:    Next 5 appointments (look out 90 days)    Sep 09, 2021  9:00 AM  (Arrive by 8:45 AM)  SHORT with Abdulkadir Huizar MD  [facility] ([facility] ) [address]  [place] [address]  [phone]   Sep 24, 2021  9:15 AM  (Arrive by 9:00 AM)  SHORT with Abdulkadir Huizar MD  [facility] ([facility] [place] ) [address]  [place] [address]  [phone]           Routing refill request to provider for review/approval because:    Statins Protocol Failed    LDL on file in past 12 months    LDL Cholesterol Calculated   Date Value Ref Range Status   02/26/2020 75 <100 mg/dL Final     Comment:     Desirable:       <100 mg/dl

## 2021-07-30 NOTE — TELEPHONE ENCOUNTER
synthroid    Pharmacy change  Last Written Prescription Date:  5/11/21  Last Fill Quantity: 90,   # refills: 3  Last Office Visit: 5/26/21  Future Office visit:    Next 5 appointments (look out 90 days)    Sep 09, 2021  9:00 AM  (Arrive by 8:45 AM)  SHORT with Abdulkadir Huizar MD  [facility] [place] ([facility] [place] ) [address]  [place] [address]  [phone]   Sep 24, 2021  9:15 AM  (Arrive by 9:00 AM)  SHORT with Abdulkadir Huizar MD  [facility] [place] ([facility] [place] ) [address]  [place] [address]  [phone]

## 2021-08-30 NOTE — PROGRESS NOTES
Assessment & Plan     Essential hypertension with goal blood pressure less than 140/90  Stable - Continue current medications and behavioral changes.   - CBC with Platelets & Differential; Future  - Basic metabolic panel; Future    Stage 3a chronic kidney disease  Stable. Continue current medications and behavioral changes.   NO nsaids   - CBC with Platelets & Differential; Future  - Basic metabolic panel; Future    Low back strain, initial encounter    PET/CT 7/2021  IMPRESSION: Interval progression of disease. The right apical groundglass attenuating lesion has enlarged and is more confluent with mild increased activity compatible with primary bronchogenic carcinoma. Treated disease in the left upper lobe has recurred with mediastinal/hilar adenopathy. There has also been development of a new metabolically active sclerotic lesion in the anterior right 7th rib, compatible with osseous metastatic disease.     Dictated By: Louis Horton MD 7/6/2021 9:43 AM   Edited By: TERRY 7/6/2021 10:51 AM       New- seems msk. Will treat with Norflex.  Discussed behavioral changes and proper body mechanics needed to help control patient's back pain.   Discussed in length conservative measures of OTC medications for pain, Ice/Heat, elevation and the concept of R.I.C.E.. Continue behavioral changes and proper body mechanics to allow for healing. Follow up as directed.   IF not getting better - needs imaging since has lung cancer .  PET /CT above does not show dz in back BUT looks to have in rib.   - orphenadrine ER (NORFLEX) 100 MG 12 hr tablet; Take 1 tablet (100 mg) by mouth 2 times daily as needed for muscle spasms or moderate to severe pain    Malignant neoplasm of overlapping sites of right lung (H)  As above     Anemia in neoplastic disease  Slow decrease - seems to correlate with new chemo. Oncology is monitoring .       Mentally pt says doing ok - will watch.       Follow up open ended since seeing Oncology frequently.  "             BMI:   Estimated body mass index is 30.57 kg/m  as calculated from the following:    Height as of this encounter: 1.74 m (5' 8.5\").    Weight as of this encounter: 92.5 kg (204 lb).           No follow-ups on file.    Abdulkadir Huizar MD  [facility] - REJI Cruz is a 66 year old who presents for the following health issues     HPI     Chronic Kidney Disease Follow-up      Do you take any over the counter pain medicine?: No        Musculoskeletal problem/pain  Onset/Duration: couple days  Description  Location: back - low back / midline center  Joint Swelling: no  Redness: no  Pain: YES  Warmth: no  Intensity:  moderate  Progression of Symptoms:  same and constant  Accompanying signs and symptoms:   Fevers: no  Numbness/tingling/weakness: no  History  Trauma to the area: no  Recent illness:  no  Previous similar problem: no  Previous evaluation:  no  Precipitating or alleviating factors:  Aggravating factors include: sitting  Therapies tried and outcome: ice and tens unit-- helps   spasming some   Getting some better  No pain in legs.       Review of Systems   Constitutional, HEENT, cardiovascular, pulmonary, gi and gu systems are negative, except as otherwise noted.    pt is on chemo for lung cancer.  Recovered from [facility]-- doing well there.     Chemo had decreased his WBC and some lower HGB.     Objective    /70   Pulse 76   Temp 98.5  F (36.9  C)   Ht 1.74 m (5' 8.5\")   Wt 92.5 kg (204 lb)   SpO2 98%   BMI 30.57 kg/m    Body mass index is 30.57 kg/m .  Physical Exam   GENERAL: healthy, alert and no distress  NECK: no adenopathy, no asymmetry, masses, or scars and thyroid normal to palpation  RESP: lungs clear to auscultation - no rales, rhonchi or wheezes  CV: regular rate and rhythm, normal S1 S2, no S3 or S4, no murmur, click or rub, no peripheral edema and peripheral pulses strong  BACK: no CVA tenderness, no paralumbar tenderness    Results for orders " placed or performed in visit on 09/09/21   CBC with Platelets & Differential     Status: Abnormal    Narrative    The following orders were created for panel order CBC with Platelets & Differential.  Procedure                               Abnormality         Status                     ---------                               -----------         ------                     CBC with platelets and d...[[id number]]  Abnormal            Final result                 Please view results for these tests on the individual orders.   CBC with platelets and differential     Status: Abnormal   Result Value Ref Range    WBC Count 6.5 4.0 - 11.0 10e3/uL    RBC Count 3.03 (L) 4.40 - 5.90 10e6/uL    Hemoglobin 10.3 (L) 13.3 - 17.7 g/dL    Hematocrit 30.3 (L) 40.0 - 53.0 %     78 - 100 fL    MCH 34.0 (H) 26.5 - 33.0 pg    MCHC 34.0 31.5 - 36.5 g/dL    RDW 18.7 (H) 10.0 - 15.0 %    Platelet Count 150 150 - 450 10e3/uL    % Neutrophils 66 %    % Lymphocytes 18 %    % Monocytes 11 %    % Eosinophils 2 %    % Basophils 1 %    % Immature Granulocytes 2 %    NRBCs per 100 WBC 0 <1 /100    Absolute Neutrophils 4.3 1.6 - 8.3 10e3/uL    Absolute Lymphocytes 1.2 0.8 - 5.3 10e3/uL    Absolute Monocytes 0.7 0.0 - 1.3 10e3/uL    Absolute Eosinophils 0.1 0.0 - 0.7 10e3/uL    Absolute Basophils 0.0 0.0 - 0.2 10e3/uL    Absolute Immature Granulocytes 0.2 (H) <=0.0 10e3/uL    Absolute NRBCs 0.0 10e3/uL

## 2021-09-09 PROBLEM — D63.0 ANEMIA IN NEOPLASTIC DISEASE: Status: ACTIVE | Noted: 2021-01-01

## 2021-09-09 NOTE — NURSING NOTE
"Chief Complaint   Patient presents with     Kidney Problem       Initial /70   Pulse 76   Temp 98.5  F (36.9  C)   Ht 1.74 m (5' 8.5\")   Wt 92.5 kg (204 lb)   SpO2 98%   BMI 30.57 kg/m   Estimated body mass index is 30.57 kg/m  as calculated from the following:    Height as of this encounter: 1.74 m (5' 8.5\").    Weight as of this encounter: 92.5 kg (204 lb).  Medication Reconciliation: complete  Calvin Torre LPN  "

## 2022-02-23 DIAGNOSIS — E03.9 ACQUIRED HYPOTHYROIDISM: ICD-10-CM

## 2022-02-23 RX ORDER — LEVOTHYROXINE SODIUM 137 UG/1
TABLET ORAL
Qty: 90 TABLET | Refills: 2 | OUTPATIENT
Start: 2022-02-23

## 2022-04-02 DIAGNOSIS — K21.9 GASTROESOPHAGEAL REFLUX DISEASE WITHOUT ESOPHAGITIS: ICD-10-CM

## 2022-04-02 RX ORDER — OMEPRAZOLE 40 MG/1
CAPSULE, DELAYED RELEASE ORAL
Qty: 90 CAPSULE | Refills: 3 | OUTPATIENT
Start: 2022-04-02

## 2022-04-21 DIAGNOSIS — F43.21 ADJUSTMENT DISORDER WITH DEPRESSED MOOD: ICD-10-CM

## 2022-04-21 RX ORDER — BUPROPION HYDROCHLORIDE 300 MG/1
TABLET ORAL
Qty: 90 TABLET | Refills: 3 | OUTPATIENT
Start: 2022-04-21

## 2023-06-28 NOTE — Clinical Note
Send all - thanks  [SOB] : shortness of breath [Chest Discomfort] : chest discomfort [Negative] : Heme/Lymph [Fever] : no fever [Chills] : no chills [Feeling Fatigued] : not feeling fatigued [Weight Loss (___ Lbs)] : no recent weight loss [Dyspnea on exertion] : not dyspnea during exertion [Lower Ext Edema] : no extremity edema [Leg Claudication] : no intermittent leg claudication [Palpitations] : no palpitations [Orthopnea] : no orthopnea [PND] : no PND [Syncope] : no syncope [Cough] : no cough [Wheezing] : no wheezing [Coughing Up Blood] : no hemoptysis [Nausea] : no nausea [Vomiting] : no vomiting [Blood in stool] : no blood in stoo [Hematuria] : no hematuria [Muscle Cramps] : no muscle cramps [Myalgia] : no myalgia [Dizziness] : no dizziness [Confusion] : no confusion was observed [Easy Bleeding] : no tendency for easy bleeding [Easy Bruising] : no tendency for easy bruising

## 2024-05-13 NOTE — TELEPHONE ENCOUNTER
10:31 AM    Reason for Call: Phone Call- referral update     Description: Patient called and would like General surgery referral to be sent to [facility] for Dr. Rowdy Darnell instead of Dr. Tomlin. Patient would like to gastroenterology instead of general surgery.     Was an appointment offered for this call? No  If yes : Appointment type              Date    Preferred method for responding to this message: Telephone Call  What is your phone number ?[phone]    If we cannot reach you directly, may we leave a detailed response at the number you provided? Yes    Can this message wait until your PCP/provider returns, if available today? Not applicable    Kenyatta Hidalgo MA      
Done -  Just so he knows may take longer to see -- lot of time booked out far.  His call..   
Left message.  
MDS (myelodysplastic syndrome)

## (undated) DEVICE — BDG-STRIPS EXTRA LARGE

## (undated) DEVICE — APPLICATOR-CHLORAPREP 26ML TINTED CHG 2%+ 70% IPA-SURGICAL

## (undated) DEVICE — SUTURE-VICRYL 0 UR-6 J603H

## (undated) DEVICE — SYRINGE-10CC LUER LOCK

## (undated) DEVICE — CANISTER-SUCTION 2000CC

## (undated) DEVICE — SCD SLEEVE-KNEE REG.

## (undated) DEVICE — NDL-25G 1-1/2" NON-SAFETY

## (undated) DEVICE — IRRIGATION-H2O 1000ML

## (undated) DEVICE — DRAPE-IOBAN 2 35CM X 35CM

## (undated) DEVICE — SWABSTICK-BENZOIN

## (undated) DEVICE — BDG-STAT STRIP

## (undated) DEVICE — GOWN-SURG XL LVL 3 REINFORCED

## (undated) DEVICE — PACK-LAPAROTOMY-CUSTOM

## (undated) DEVICE — TUBING-SUCTION 20FT

## (undated) DEVICE — TROCAR SLEEVE-KII 5X100MM

## (undated) DEVICE — LABEL-STERILE PREPRINTED FOR OR

## (undated) DEVICE — STERI-STRIP-1/2" X 4"

## (undated) DEVICE — CATH TRAY-TEMP SENSING 16FR LATEX]

## (undated) DEVICE — LIGHT HANDLE COVER

## (undated) DEVICE — CAUTERY-LAP L-HOOK

## (undated) DEVICE — BLADE-SCALPEL #11

## (undated) DEVICE — KII DISSECTING BALLOON ACCESS SYSTEM-OVAL

## (undated) DEVICE — WANDS-INSULSCAN

## (undated) DEVICE — ANTI-FOG AGENT

## (undated) DEVICE — IRRIGATION-NACL 1000ML

## (undated) DEVICE — BLANKET-BAIR UPPER BODY

## (undated) DEVICE — CAUTERY PAD-POLYHESIVE II ADULT

## (undated) DEVICE — SENSOR-OXISENSOR II ADULT

## (undated) DEVICE — BIN-COVIDIEN MESH BIN

## (undated) DEVICE — TROCAR-5X100MM FIOS BLADELESS

## (undated) DEVICE — SUTURE-MONOCRYL 4-0 Y494G

## (undated) DEVICE — CORD-LAPAROSCOPIC MONOPOLAR-DISPOSABLE

## (undated) DEVICE — GLV-7.5 BIOGEL LATEX

## (undated) DEVICE — CLIP APPLIER-LIGAMAX 5MM MEDIUM/LARGE

## (undated) DEVICE — TUBING-INSUFFLATION/LAPAROFLATOR W/FILTER

## (undated) DEVICE — TUBE-SALEM SUMP 18FR STOMACH SUCTION

## (undated) RX ORDER — NEOSTIGMINE METHYLSULFATE 1 MG/ML
VIAL (ML) INJECTION
Status: DISPENSED
Start: 2018-07-18

## (undated) RX ORDER — ROCURONIUM BROMIDE 50 MG/5 ML
SYRINGE (ML) INTRAVENOUS
Status: DISPENSED
Start: 2018-07-18

## (undated) RX ORDER — LIDOCAINE HYDROCHLORIDE 20 MG/ML
INJECTION, SOLUTION EPIDURAL; INFILTRATION; INTRACAUDAL; PERINEURAL
Status: DISPENSED
Start: 2018-07-18

## (undated) RX ORDER — FENTANYL CITRATE 50 UG/ML
INJECTION, SOLUTION INTRAMUSCULAR; INTRAVENOUS
Status: DISPENSED
Start: 2018-07-18

## (undated) RX ORDER — ONDANSETRON 2 MG/ML
INJECTION INTRAMUSCULAR; INTRAVENOUS
Status: DISPENSED
Start: 2018-07-18

## (undated) RX ORDER — GLYCOPYRROLATE 0.2 MG/ML
INJECTION, SOLUTION INTRAMUSCULAR; INTRAVENOUS
Status: DISPENSED
Start: 2018-07-18

## (undated) RX ORDER — DEXAMETHASONE SODIUM PHOSPHATE 10 MG/ML
INJECTION, SOLUTION INTRAMUSCULAR; INTRAVENOUS
Status: DISPENSED
Start: 2018-07-18

## (undated) RX ORDER — PROPOFOL 10 MG/ML
INJECTION, EMULSION INTRAVENOUS
Status: DISPENSED
Start: 2018-07-18

## (undated) RX ORDER — KETOROLAC TROMETHAMINE 30 MG/ML
INJECTION, SOLUTION INTRAMUSCULAR; INTRAVENOUS
Status: DISPENSED
Start: 2018-07-18